# Patient Record
Sex: MALE | Race: WHITE | NOT HISPANIC OR LATINO | Employment: OTHER | URBAN - METROPOLITAN AREA
[De-identification: names, ages, dates, MRNs, and addresses within clinical notes are randomized per-mention and may not be internally consistent; named-entity substitution may affect disease eponyms.]

---

## 2017-01-09 DIAGNOSIS — E78.5 HYPERLIPIDEMIA, UNSPECIFIED HYPERLIPIDEMIA TYPE: ICD-10-CM

## 2017-01-09 RX ORDER — PRAVASTATIN SODIUM 80 MG/1
80 TABLET ORAL DAILY
Qty: 30 TABLET | Refills: 3 | Status: SHIPPED | OUTPATIENT
Start: 2017-01-09 | End: 2017-05-09 | Stop reason: SDUPTHER

## 2017-04-12 ENCOUNTER — OFFICE VISIT (OUTPATIENT)
Dept: FAMILY MEDICINE CLINIC | Facility: CLINIC | Age: 82
End: 2017-04-12

## 2017-04-12 VITALS
SYSTOLIC BLOOD PRESSURE: 102 MMHG | WEIGHT: 201 LBS | OXYGEN SATURATION: 97 % | HEIGHT: 70 IN | DIASTOLIC BLOOD PRESSURE: 78 MMHG | HEART RATE: 76 BPM | BODY MASS INDEX: 28.77 KG/M2 | TEMPERATURE: 97.6 F

## 2017-04-12 DIAGNOSIS — J40 BRONCHITIS: Primary | ICD-10-CM

## 2017-04-12 PROCEDURE — 99213 OFFICE O/P EST LOW 20 MIN: CPT | Performed by: FAMILY MEDICINE

## 2017-04-12 RX ORDER — DEXTROMETHORPHAN HYDROBROMIDE AND PROMETHAZINE HYDROCHLORIDE 15; 6.25 MG/5ML; MG/5ML
5 SYRUP ORAL 4 TIMES DAILY PRN
Qty: 240 ML | Refills: 1 | Status: SHIPPED | OUTPATIENT
Start: 2017-04-12 | End: 2017-11-10

## 2017-04-12 RX ORDER — LEVOFLOXACIN 500 MG/1
500 TABLET, FILM COATED ORAL DAILY
Qty: 10 TABLET | Refills: 0 | Status: SHIPPED | OUTPATIENT
Start: 2017-04-12 | End: 2017-11-10

## 2017-04-12 NOTE — PROGRESS NOTES
Subjective   Darron Martino is a 84 y.o. male    HPI Comments: Patient presents with one-week history of cough, intermittent fever, slight sore throat, burning sensation in the chest and mild shortness of breath.  He is tried over-the-counter medications without relief.  He is worried about pneumonia.    Cough   Associated symptoms include a fever, shortness of breath and wheezing. Pertinent negatives include no chest pain or chills.   Fever    Associated symptoms include coughing and wheezing. Pertinent negatives include no chest pain or nausea.   URI    Associated symptoms include coughing and wheezing. Pertinent negatives include no chest pain or nausea.   Shortness of Breath   Associated symptoms include a fever and wheezing. Pertinent negatives include no chest pain.       The following portions of the patient's history were reviewed and updated as appropriate: allergies, current medications, past social history and problem list    Review of Systems   Constitutional: Positive for fever. Negative for chills and fatigue.   HENT: Negative.    Respiratory: Positive for cough, shortness of breath and wheezing. Negative for chest tightness.    Cardiovascular: Negative for chest pain.   Gastrointestinal: Negative for nausea.       Objective     Vitals:    04/12/17 1020   BP: 102/78   Pulse: 76   Temp: 97.6 °F (36.4 °C)   SpO2: 97%       Physical Exam   Constitutional: He is oriented to person, place, and time. He appears well-developed and well-nourished.   HENT:   Head: Normocephalic and atraumatic.   Nose: Nose normal.   Mouth/Throat: Oropharynx is clear and moist.   Neck: Neck supple. No JVD present.   Cardiovascular: Normal rate, regular rhythm and normal heart sounds.    No murmur heard.  Pulmonary/Chest: Effort normal. No stridor. No respiratory distress. He has wheezes.   Musculoskeletal: He exhibits no edema.   Lymphadenopathy:     He has no cervical adenopathy.   Neurological: He is alert and oriented to  person, place, and time.   Skin: Skin is warm and dry. No rash noted.   Psychiatric: He has a normal mood and affect. His behavior is normal.   Nursing note and vitals reviewed.      Assessment/Plan   Problem List Items Addressed This Visit     None      Visit Diagnoses     Bronchitis    -  Primary    Relevant Medications    levoFLOXacin (LEVAQUIN) 500 MG tablet    promethazine-dextromethorphan (PROMETHAZINE-DM) 6.25-15 MG/5ML syrup

## 2017-05-01 DIAGNOSIS — I10 ESSENTIAL HYPERTENSION: ICD-10-CM

## 2017-05-01 RX ORDER — METOPROLOL SUCCINATE 50 MG/1
TABLET, EXTENDED RELEASE ORAL
Qty: 180 TABLET | Refills: 0 | Status: SHIPPED | OUTPATIENT
Start: 2017-05-01 | End: 2017-07-30 | Stop reason: SDUPTHER

## 2017-05-09 DIAGNOSIS — E78.5 HYPERLIPIDEMIA, UNSPECIFIED HYPERLIPIDEMIA TYPE: ICD-10-CM

## 2017-05-09 RX ORDER — PRAVASTATIN SODIUM 80 MG/1
80 TABLET ORAL DAILY
Qty: 30 TABLET | Refills: 3 | Status: SHIPPED | OUTPATIENT
Start: 2017-05-09 | End: 2017-09-18 | Stop reason: SDUPTHER

## 2017-05-19 DIAGNOSIS — I10 ESSENTIAL HYPERTENSION: ICD-10-CM

## 2017-05-19 RX ORDER — RAMIPRIL 5 MG/1
CAPSULE ORAL
Qty: 90 CAPSULE | Refills: 0 | Status: SHIPPED | OUTPATIENT
Start: 2017-05-19 | End: 2017-08-21 | Stop reason: SDUPTHER

## 2017-07-30 DIAGNOSIS — I10 ESSENTIAL HYPERTENSION: ICD-10-CM

## 2017-07-31 RX ORDER — METOPROLOL SUCCINATE 50 MG
TABLET, EXTENDED RELEASE 24 HR ORAL
Qty: 180 TABLET | Refills: 0 | Status: SHIPPED | OUTPATIENT
Start: 2017-07-31 | End: 2017-10-29 | Stop reason: SDUPTHER

## 2017-08-21 DIAGNOSIS — I10 ESSENTIAL HYPERTENSION: ICD-10-CM

## 2017-08-21 RX ORDER — RAMIPRIL 5 MG/1
CAPSULE ORAL
Qty: 90 CAPSULE | Refills: 1 | Status: SHIPPED | OUTPATIENT
Start: 2017-08-21 | End: 2017-11-10 | Stop reason: SDUPTHER

## 2017-09-14 ENCOUNTER — TELEPHONE (OUTPATIENT)
Dept: FAMILY MEDICINE CLINIC | Facility: CLINIC | Age: 82
End: 2017-09-14

## 2017-09-14 RX ORDER — CLOPIDOGREL BISULFATE 75 MG/1
75 TABLET ORAL DAILY
Qty: 90 TABLET | Refills: 1 | Status: SHIPPED | OUTPATIENT
Start: 2017-09-14 | End: 2017-09-18 | Stop reason: SDUPTHER

## 2017-09-14 NOTE — TELEPHONE ENCOUNTER
----- Message from Trista English sent at 9/14/2017  1:52 PM EDT -----  Contact: PATIENT  NEEDS A REFILL SENT IN FOR:    clopidogrel (PLAVIX) 75 MG tablet         Sig - Route: Take 75 mg by mouth daily. - Oral    [facility] - [address] - [phone]  - [phone] (Phone)  [phone] (Fax)    THANK YOU

## 2017-09-18 ENCOUNTER — TELEPHONE (OUTPATIENT)
Dept: FAMILY MEDICINE CLINIC | Facility: CLINIC | Age: 82
End: 2017-09-18

## 2017-09-18 DIAGNOSIS — E78.5 HYPERLIPIDEMIA, UNSPECIFIED HYPERLIPIDEMIA TYPE: ICD-10-CM

## 2017-09-18 RX ORDER — CLOPIDOGREL BISULFATE 75 MG/1
75 TABLET ORAL DAILY
Qty: 90 TABLET | Refills: 1 | Status: SHIPPED | OUTPATIENT
Start: 2017-09-18 | End: 2017-11-10 | Stop reason: SDUPTHER

## 2017-09-18 RX ORDER — PRAVASTATIN SODIUM 80 MG/1
80 TABLET ORAL DAILY
Qty: 30 TABLET | Refills: 3 | Status: SHIPPED | OUTPATIENT
Start: 2017-09-18 | End: 2017-11-10 | Stop reason: SDUPTHER

## 2017-09-18 NOTE — TELEPHONE ENCOUNTER
Rx for pravastatin sent to [facility] and [facility] as requested in pt's message. Rx's were sent electronically.

## 2017-09-18 NOTE — TELEPHONE ENCOUNTER
----- Message from Trista English sent at 9/18/2017  3:04 PM EDT -----  Contact: PATIENT  NEEDS A REFILL CALLED IN FOR:     pravastatin (PRAVACHOL) 80 MG tablet 30 tablet        Sig - Route: Take 1 tablet by mouth Daily. - Oral     Associated Diagnoses       Pharmacy     [facility] - [address] - [phone]  - [phone] FX          THE FOLLOWING MEDICATION NEEDS TO BE SENT TO [facility] (WAS SENT TO [facility] LAST WEEK) :  clopidogrel (PLAVIX) 75 MG tablet 90 tablet      Sig - Route: Take 1 tablet by mouth Daily. - Oral  [facility] - [address] - [phone]  - [phone] (Phone)  [phone] (Fax)  THIS WAS IN THE ORIGINAL NOTE LAST WEEK, BUT HE SAID IT WAS STILL SENT TO LOCAL RX.   THANK YOU

## 2017-10-29 DIAGNOSIS — I10 ESSENTIAL HYPERTENSION: ICD-10-CM

## 2017-11-01 RX ORDER — METOPROLOL SUCCINATE 50 MG
TABLET, EXTENDED RELEASE 24 HR ORAL
Qty: 180 TABLET | Refills: 0 | Status: SHIPPED | OUTPATIENT
Start: 2017-11-01 | End: 2017-11-10 | Stop reason: SDUPTHER

## 2017-11-10 ENCOUNTER — OFFICE VISIT (OUTPATIENT)
Dept: FAMILY MEDICINE CLINIC | Facility: CLINIC | Age: 82
End: 2017-11-10

## 2017-11-10 VITALS
WEIGHT: 202 LBS | TEMPERATURE: 97.4 F | HEIGHT: 70 IN | SYSTOLIC BLOOD PRESSURE: 126 MMHG | OXYGEN SATURATION: 98 % | DIASTOLIC BLOOD PRESSURE: 80 MMHG | BODY MASS INDEX: 28.92 KG/M2 | HEART RATE: 65 BPM

## 2017-11-10 DIAGNOSIS — I10 ESSENTIAL HYPERTENSION: ICD-10-CM

## 2017-11-10 DIAGNOSIS — M79.601 PARESTHESIA AND PAIN OF BOTH UPPER EXTREMITIES: ICD-10-CM

## 2017-11-10 DIAGNOSIS — E78.5 HYPERLIPIDEMIA, UNSPECIFIED HYPERLIPIDEMIA TYPE: ICD-10-CM

## 2017-11-10 DIAGNOSIS — M79.602 PARESTHESIA AND PAIN OF BOTH UPPER EXTREMITIES: ICD-10-CM

## 2017-11-10 DIAGNOSIS — R20.2 PARESTHESIA AND PAIN OF BOTH UPPER EXTREMITIES: ICD-10-CM

## 2017-11-10 DIAGNOSIS — I25.10 ARTERIOSCLEROSIS OF CORONARY ARTERY: Primary | ICD-10-CM

## 2017-11-10 DIAGNOSIS — M15.9 GENERALIZED OSTEOARTHROSIS, INVOLVING MULTIPLE SITES: ICD-10-CM

## 2017-11-10 PROCEDURE — 99214 OFFICE O/P EST MOD 30 MIN: CPT | Performed by: FAMILY MEDICINE

## 2017-11-10 RX ORDER — CYCLOBENZAPRINE HCL 5 MG
5 TABLET ORAL NIGHTLY
Qty: 30 TABLET | Refills: 5 | Status: SHIPPED | OUTPATIENT
Start: 2017-11-10 | End: 2019-01-01

## 2017-11-10 RX ORDER — CLOPIDOGREL BISULFATE 75 MG/1
75 TABLET ORAL DAILY
Qty: 90 TABLET | Refills: 3 | Status: SHIPPED | OUTPATIENT
Start: 2017-11-10 | End: 2018-05-12 | Stop reason: SDUPTHER

## 2017-11-10 RX ORDER — METOPROLOL SUCCINATE 50 MG/1
50 TABLET, EXTENDED RELEASE ORAL 2 TIMES DAILY
Qty: 180 TABLET | Refills: 3 | Status: SHIPPED | OUTPATIENT
Start: 2017-11-10 | End: 2018-08-20

## 2017-11-10 RX ORDER — RAMIPRIL 5 MG/1
5 CAPSULE ORAL DAILY
Qty: 90 CAPSULE | Refills: 3 | Status: SHIPPED | OUTPATIENT
Start: 2017-11-10 | End: 2019-01-01

## 2017-11-10 RX ORDER — PRAVASTATIN SODIUM 80 MG/1
80 TABLET ORAL DAILY
Qty: 90 TABLET | Refills: 3 | Status: SHIPPED | OUTPATIENT
Start: 2017-11-10 | End: 2018-11-26 | Stop reason: SDUPTHER

## 2017-11-10 RX ORDER — NITROGLYCERIN 0.4 MG/1
0.4 TABLET SUBLINGUAL
Qty: 30 TABLET | Refills: 11 | Status: SHIPPED | OUTPATIENT
Start: 2017-11-10 | End: 2019-01-01 | Stop reason: SDUPTHER

## 2017-11-10 NOTE — PROGRESS NOTES
Subjective   Darron Martino is a 85 y.o. male    HPI Comments: Patient presents for recheck regarding coronary artery disease, hypertension, hyperlipidemia and osteoarthritis.  He is due for refills on several of his medications.  Review of his chart shows that he is not had lab work in over a year.  He reports is having increased pain in his upper back and neck area.  X-rays done in 2015 of his thoracic spine revealed arthritis changes.  Patient reports that he has not had any anginal type symptoms, exertional dyspnea, edema, orthopnea or PND.    Osteoarthritis   Associated symptoms include arthralgias and neck pain. Pertinent negatives include no abdominal pain, chest pain, chills, coughing, fatigue, fever, headaches, myalgias, nausea, rash, vomiting or weakness.   Hypertension   Associated symptoms include neck pain. Pertinent negatives include no chest pain, headaches, palpitations or shortness of breath.   Coronary Artery Disease   Pertinent negatives include no chest pain, chest tightness, dizziness, leg swelling, palpitations or shortness of breath. Risk factors include hyperlipidemia.   Hyperlipidemia   Pertinent negatives include no chest pain, myalgias or shortness of breath.       The following portions of the patient's history were reviewed and updated as appropriate: allergies, current medications, past social history and problem list    Review of Systems   Constitutional: Negative for chills, fatigue, fever and unexpected weight change.   HENT: Negative.    Eyes: Negative.    Respiratory: Negative for cough, chest tightness, shortness of breath and wheezing.    Cardiovascular: Negative for chest pain, palpitations and leg swelling.   Gastrointestinal: Negative for abdominal pain, nausea and vomiting.   Endocrine: Negative for polydipsia, polyphagia and polyuria.   Genitourinary: Negative for dysuria, frequency and urgency.   Musculoskeletal: Positive for arthralgias, back pain, neck pain and neck  stiffness. Negative for myalgias.   Skin: Negative for color change and rash.   Neurological: Negative for dizziness, syncope, weakness and headaches.   Hematological: Negative for adenopathy. Does not bruise/bleed easily.   Psychiatric/Behavioral: Negative for dysphoric mood. The patient is not nervous/anxious.        Objective     Vitals:    11/10/17 1356   BP: 126/80   Pulse: 65   Temp: 97.4 °F (36.3 °C)   SpO2: 98%       Physical Exam   Constitutional: He is oriented to person, place, and time. He appears well-developed and well-nourished.   HENT:   Head: Normocephalic.   Mouth/Throat: Oropharynx is clear and moist.   Eyes: Conjunctivae are normal. Pupils are equal, round, and reactive to light.   Neck: Neck supple. No JVD present. Spinous process tenderness and muscular tenderness present. Decreased range of motion present. No thyromegaly present.   Cardiovascular: Normal rate, regular rhythm, normal heart sounds, intact distal pulses and normal pulses.    No murmur heard.  Pulmonary/Chest: Effort normal and breath sounds normal. No respiratory distress.   Abdominal: Soft. Bowel sounds are normal. There is no hepatosplenomegaly. There is no tenderness.   Musculoskeletal: He exhibits no edema.        Lumbar back: He exhibits decreased range of motion, tenderness, bony tenderness and pain. He exhibits no swelling, no deformity and no spasm.   Lymphadenopathy:     He has no cervical adenopathy.   Neurological: He is alert and oriented to person, place, and time. He has normal reflexes.   Skin: Skin is warm and dry. No rash noted.   Psychiatric: He has a normal mood and affect. His behavior is normal.   Nursing note and vitals reviewed.      Assessment/Plan   Problem List Items Addressed This Visit        Cardiovascular and Mediastinum    Hypertension    Relevant Medications    ramipril (ALTACE) 5 MG capsule    metoprolol succinate XL (TOPROL XL) 50 MG 24 hr tablet    Other Relevant Orders    Comprehensive  Metabolic Panel    Arteriosclerosis of coronary artery - Primary    Relevant Medications    clopidogrel (PLAVIX) 75 MG tablet    nitroglycerin (NITROSTAT) 0.4 MG SL tablet    metoprolol succinate XL (TOPROL XL) 50 MG 24 hr tablet    Other Relevant Orders    Comprehensive Metabolic Panel    Lipid Panel       Musculoskeletal and Integument    Generalized osteoarthrosis, involving multiple sites      Other Visit Diagnoses     Hyperlipidemia, unspecified hyperlipidemia type        Relevant Medications    pravastatin (PRAVACHOL) 80 MG tablet    Other Relevant Orders    Comprehensive Metabolic Panel    Lipid Panel    Paresthesia and pain of both upper extremities

## 2018-04-17 DIAGNOSIS — I10 ESSENTIAL HYPERTENSION: ICD-10-CM

## 2018-04-17 RX ORDER — RAMIPRIL 5 MG/1
CAPSULE ORAL
Qty: 90 CAPSULE | Refills: 1 | Status: SHIPPED | OUTPATIENT
Start: 2018-04-17 | End: 2018-08-20 | Stop reason: SDUPTHER

## 2018-05-18 RX ORDER — CLOPIDOGREL BISULFATE 75 MG/1
TABLET ORAL
Qty: 90 TABLET | Refills: 0 | Status: SHIPPED | OUTPATIENT
Start: 2018-05-18 | End: 2019-01-01 | Stop reason: SDUPTHER

## 2018-08-20 ENCOUNTER — OFFICE VISIT (OUTPATIENT)
Dept: FAMILY MEDICINE CLINIC | Facility: CLINIC | Age: 83
End: 2018-08-20

## 2018-08-20 ENCOUNTER — HOSPITAL ENCOUNTER (OUTPATIENT)
Dept: GENERAL RADIOLOGY | Facility: HOSPITAL | Age: 83
Discharge: HOME | End: 2018-08-20
Admitting: FAMILY MEDICINE

## 2018-08-20 VITALS
DIASTOLIC BLOOD PRESSURE: 70 MMHG | OXYGEN SATURATION: 98 % | HEIGHT: 70 IN | HEART RATE: 58 BPM | TEMPERATURE: 97.5 F | SYSTOLIC BLOOD PRESSURE: 150 MMHG | BODY MASS INDEX: 28.63 KG/M2 | WEIGHT: 200 LBS

## 2018-08-20 DIAGNOSIS — M25.511 ACUTE PAIN OF RIGHT SHOULDER: Primary | ICD-10-CM

## 2018-08-20 DIAGNOSIS — M15.9 GENERALIZED OSTEOARTHROSIS, INVOLVING MULTIPLE SITES: ICD-10-CM

## 2018-08-20 DIAGNOSIS — D49.2 NEOPLASM OF SKIN: ICD-10-CM

## 2018-08-20 DIAGNOSIS — M25.511 ACUTE PAIN OF RIGHT SHOULDER: ICD-10-CM

## 2018-08-20 PROCEDURE — 73030 X-RAY EXAM OF SHOULDER: CPT

## 2018-08-20 PROCEDURE — 99213 OFFICE O/P EST LOW 20 MIN: CPT | Performed by: FAMILY MEDICINE

## 2018-08-20 RX ORDER — IBUPROFEN 800 MG/1
800 TABLET ORAL EVERY 8 HOURS PRN
Qty: 90 TABLET | Refills: 11 | Status: SHIPPED | OUTPATIENT
Start: 2018-08-20 | End: 2019-01-01 | Stop reason: HOSPADM

## 2018-08-20 NOTE — PROGRESS NOTES
Subjective   Darron Martino is a 86 y.o. male    Patient presents today complaining of right shoulder pain with decreased mobility due to pain.  Symptoms have been present for 3 months and not improving.  He is able to get by taking ibuprofen 800 mg 3 times a day.  He stays quite active physically.  This discomfort has limited his activities.  He knows of no specific injury or triggering incident.    Pigmented skin lesion ×3 left forearm volar surface gradually increasing in size.  No pain.  Initial lesion noted approximately one year ago.  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        The following portions of the patient's history were reviewed and updated as appropriate: allergies, current medications, past social history and problem list    Review of Systems   Constitutional: Negative for chills, fatigue and fever.   HENT: Negative for trouble swallowing and voice change.    Respiratory: Negative for cough, shortness of breath and wheezing.    Cardiovascular: Negative for chest pain and palpitations.   Musculoskeletal: Positive for arthralgias and myalgias.   Skin: Positive for color change.   Neurological: Positive for weakness. Negative for dizziness and numbness.   Hematological: Negative.        Objective     Vitals:    08/20/18 0915   BP: 150/70   Pulse: 58   Temp: 97.5 °F (36.4 °C)   SpO2: 98%       Physical Exam   Constitutional: He appears well-developed and well-nourished.   HENT:   Head: Normocephalic and atraumatic.   Eyes: Pupils are equal, round, and reactive to light. Conjunctivae are normal.   Neck: Normal range of motion.   Cardiovascular: Normal rate and regular rhythm.    Pulmonary/Chest: Effort normal and breath sounds normal.   Musculoskeletal:        Right shoulder: He exhibits decreased range of motion, tenderness, bony tenderness, crepitus, pain and decreased strength. He exhibits no swelling, no effusion and no deformity.   Lymphadenopathy:      He has no cervical adenopathy.   Skin: Skin is warm and dry.   Left mid volar forearm with 2 irregularly pigmented lesions with areas of deep pigment along with lighter brown pigment.  These are adjacent to each other and are along a previous surgical scar.   Nursing note and vitals reviewed.      Assessment/Plan   Problem List Items Addressed This Visit        Musculoskeletal and Integument    Generalized osteoarthrosis, involving multiple sites      Other Visit Diagnoses     Acute pain of right shoulder    -  Primary    Relevant Orders    XR Shoulder 2+ View Right    Neoplasm of skin        Relevant Orders    Ambulatory Referral to Dermatology        Consider referral to orthopedics for injection therapy.  Likely will also need physical therapy.

## 2018-09-05 ENCOUNTER — TELEPHONE (OUTPATIENT)
Dept: FAMILY MEDICINE CLINIC | Facility: CLINIC | Age: 83
End: 2018-09-05

## 2018-09-05 NOTE — TELEPHONE ENCOUNTER
Called patient and explained about his shoulder having arthritis.  He is not wanting PT or injections at this time.

## 2018-09-05 NOTE — TELEPHONE ENCOUNTER
----- Message from Roseline Spivey sent at 9/5/2018  1:36 PM EDT -----  Contact: PT  WOULD LIKE RESULTS OF SHOULDER XRAYS FROM A COUPLE WEEK AGO. PLEASE CALL PT.

## 2018-10-09 ENCOUNTER — CLINICAL SUPPORT (OUTPATIENT)
Dept: FAMILY MEDICINE CLINIC | Facility: CLINIC | Age: 83
End: 2018-10-09

## 2018-10-09 DIAGNOSIS — Z23 IMMUNIZATION DUE: Primary | ICD-10-CM

## 2018-11-26 ENCOUNTER — TELEPHONE (OUTPATIENT)
Dept: FAMILY MEDICINE CLINIC | Facility: CLINIC | Age: 83
End: 2018-11-26

## 2018-11-26 DIAGNOSIS — E78.5 HYPERLIPIDEMIA, UNSPECIFIED HYPERLIPIDEMIA TYPE: ICD-10-CM

## 2018-11-26 RX ORDER — PRAVASTATIN SODIUM 80 MG/1
80 TABLET ORAL DAILY
Qty: 90 TABLET | Refills: 0 | Status: SHIPPED | OUTPATIENT
Start: 2018-11-26 | End: 2019-01-01 | Stop reason: SDUPTHER

## 2018-11-26 NOTE — TELEPHONE ENCOUNTER
----- Message from Trista English sent at 11/26/2018  9:39 AM EST -----  Contact: PATIENT  NEEDS A REFILL CALLED IN FOR THE FOLLOWING: HE IS OUT:    pravastatin (PRAVACHOL) 80 MG tablet 90 tablet 3    Sig - Route: Take 1 tablet by mouth Daily. - Oral   Sent to pharmacy as: pravastatin 80 MG PO tablet   E-Prescribing Status: Receipt confirmed by pharmacy (   Associated Diagnoses     Hyperlipidemia, unspecified hyperlipidemia type     Pharmacy     [facility] [place] [id number] [address]  - [phone]  - [phone] FX

## 2019-01-01 ENCOUNTER — HOSPITAL ENCOUNTER (OUTPATIENT)
Dept: ONCOLOGY | Facility: HOSPITAL | Age: 84
Setting detail: INFUSION SERIES
Discharge: HOME | End: 2019-11-22

## 2019-01-01 ENCOUNTER — HOSPITAL ENCOUNTER (OUTPATIENT)
Dept: CT IMAGING | Facility: HOSPITAL | Age: 84
Discharge: HOME | End: 2019-12-06
Admitting: INTERNAL MEDICINE

## 2019-01-01 ENCOUNTER — HOSPITAL ENCOUNTER (OUTPATIENT)
Dept: ONCOLOGY | Facility: HOSPITAL | Age: 84
Setting detail: INFUSION SERIES
Discharge: HOME | End: 2019-10-25

## 2019-01-01 ENCOUNTER — OFFICE VISIT (OUTPATIENT)
Dept: ONCOLOGY | Facility: CLINIC | Age: 84
End: 2019-01-01

## 2019-01-01 ENCOUNTER — HOSPITAL ENCOUNTER (OUTPATIENT)
Dept: GENERAL RADIOLOGY | Facility: HOSPITAL | Age: 84
Discharge: HOME | End: 2019-07-23
Admitting: SURGERY

## 2019-01-01 ENCOUNTER — APPOINTMENT (OUTPATIENT)
Dept: LAB | Facility: HOSPITAL | Age: 84
End: 2019-01-01

## 2019-01-01 ENCOUNTER — HOSPITAL ENCOUNTER (INPATIENT)
Facility: HOSPITAL | Age: 84
LOS: 2 days | End: 2019-12-13
Attending: INTERNAL MEDICINE | Admitting: INTERNAL MEDICINE

## 2019-01-01 ENCOUNTER — APPOINTMENT (OUTPATIENT)
Dept: GENERAL RADIOLOGY | Facility: HOSPITAL | Age: 84
End: 2019-01-01

## 2019-01-01 ENCOUNTER — OFFICE VISIT (OUTPATIENT)
Dept: RADIATION ONCOLOGY | Facility: HOSPITAL | Age: 84
End: 2019-01-01

## 2019-01-01 ENCOUNTER — HOSPITAL ENCOUNTER (OUTPATIENT)
Dept: ONCOLOGY | Facility: HOSPITAL | Age: 84
Setting detail: INFUSION SERIES
Discharge: HOME | End: 2019-10-04

## 2019-01-01 ENCOUNTER — DOCUMENTATION (OUTPATIENT)
Dept: NUTRITION | Facility: HOSPITAL | Age: 84
End: 2019-01-01

## 2019-01-01 ENCOUNTER — HOSPITAL ENCOUNTER (OUTPATIENT)
Dept: RADIATION ONCOLOGY | Facility: HOSPITAL | Age: 84
Discharge: HOME | End: 2019-04-26

## 2019-01-01 ENCOUNTER — OFFICE VISIT (OUTPATIENT)
Dept: FAMILY MEDICINE CLINIC | Facility: CLINIC | Age: 84
End: 2019-01-01

## 2019-01-01 ENCOUNTER — HOSPITAL ENCOUNTER (OUTPATIENT)
Dept: ONCOLOGY | Facility: HOSPITAL | Age: 84
Setting detail: INFUSION SERIES
Discharge: HOME | End: 2019-07-10

## 2019-01-01 ENCOUNTER — HOSPITAL ENCOUNTER (OUTPATIENT)
Dept: RADIATION ONCOLOGY | Facility: HOSPITAL | Age: 84
Setting detail: RADIATION/ONCOLOGY SERIES
Discharge: HOME | End: 2019-05-01

## 2019-01-01 ENCOUNTER — APPOINTMENT (OUTPATIENT)
Dept: CT IMAGING | Facility: HOSPITAL | Age: 84
End: 2019-01-01

## 2019-01-01 ENCOUNTER — HOSPITAL ENCOUNTER (OUTPATIENT)
Dept: GENERAL RADIOLOGY | Facility: HOSPITAL | Age: 84
Discharge: HOME | End: 2019-02-15
Admitting: FAMILY MEDICINE

## 2019-01-01 ENCOUNTER — HOSPITAL ENCOUNTER (OUTPATIENT)
Dept: ONCOLOGY | Facility: HOSPITAL | Age: 84
Setting detail: INFUSION SERIES
Discharge: HOME | End: 2019-07-03

## 2019-01-01 ENCOUNTER — HOSPITAL ENCOUNTER (OUTPATIENT)
Dept: ONCOLOGY | Facility: HOSPITAL | Age: 84
Setting detail: INFUSION SERIES
Discharge: HOME | End: 2019-05-23

## 2019-01-01 ENCOUNTER — HOSPITAL ENCOUNTER (OUTPATIENT)
Dept: RADIATION ONCOLOGY | Facility: HOSPITAL | Age: 84
Discharge: HOME | End: 2019-04-29

## 2019-01-01 ENCOUNTER — HOSPITAL ENCOUNTER (OUTPATIENT)
Dept: ONCOLOGY | Facility: HOSPITAL | Age: 84
Discharge: HOME | End: 2019-11-15

## 2019-01-01 ENCOUNTER — LAB (OUTPATIENT)
Dept: LAB | Facility: HOSPITAL | Age: 84
End: 2019-01-01

## 2019-01-01 ENCOUNTER — HOSPITAL ENCOUNTER (OUTPATIENT)
Dept: MRI IMAGING | Facility: HOSPITAL | Age: 84
Discharge: HOME | End: 2019-05-28
Admitting: RADIOLOGY

## 2019-01-01 ENCOUNTER — READMISSION MANAGEMENT (OUTPATIENT)
Dept: CALL CENTER | Facility: HOSPITAL | Age: 84
End: 2019-01-01

## 2019-01-01 ENCOUNTER — TRANSITIONAL CARE MANAGEMENT TELEPHONE ENCOUNTER (OUTPATIENT)
Dept: FAMILY MEDICINE CLINIC | Facility: CLINIC | Age: 84
End: 2019-01-01

## 2019-01-01 ENCOUNTER — HOSPITAL ENCOUNTER (OUTPATIENT)
Dept: RADIATION ONCOLOGY | Facility: HOSPITAL | Age: 84
Discharge: HOME | End: 2019-04-25

## 2019-01-01 ENCOUNTER — HOSPITAL ENCOUNTER (OUTPATIENT)
Dept: CT IMAGING | Facility: HOSPITAL | Age: 84
Discharge: HOME | End: 2019-09-11
Admitting: INTERNAL MEDICINE

## 2019-01-01 ENCOUNTER — HOSPITAL ENCOUNTER (OUTPATIENT)
Dept: RADIATION ONCOLOGY | Facility: HOSPITAL | Age: 84
Discharge: HOME | End: 2019-04-30

## 2019-01-01 ENCOUNTER — HOSPITAL ENCOUNTER (OUTPATIENT)
Dept: RADIATION ONCOLOGY | Facility: HOSPITAL | Age: 84
Discharge: HOME | End: 2019-04-24

## 2019-01-01 ENCOUNTER — HOSPITAL ENCOUNTER (OUTPATIENT)
Dept: CT IMAGING | Facility: HOSPITAL | Age: 84
Discharge: HOME | End: 2019-05-28

## 2019-01-01 ENCOUNTER — HOSPITAL ENCOUNTER (OUTPATIENT)
Dept: ONCOLOGY | Facility: HOSPITAL | Age: 84
Setting detail: INFUSION SERIES
Discharge: HOME | End: 2019-06-13

## 2019-01-01 ENCOUNTER — HOSPITAL ENCOUNTER (OUTPATIENT)
Dept: RADIATION ONCOLOGY | Facility: HOSPITAL | Age: 84
Discharge: HOME | End: 2019-05-01

## 2019-01-01 ENCOUNTER — APPOINTMENT (OUTPATIENT)
Dept: ONCOLOGY | Facility: HOSPITAL | Age: 84
End: 2019-01-01

## 2019-01-01 ENCOUNTER — HOSPITAL ENCOUNTER (OUTPATIENT)
Dept: ONCOLOGY | Facility: HOSPITAL | Age: 84
Setting detail: INFUSION SERIES
Discharge: HOME | End: 2019-05-10

## 2019-01-01 ENCOUNTER — HOSPITAL ENCOUNTER (OUTPATIENT)
Dept: ONCOLOGY | Facility: HOSPITAL | Age: 84
Setting detail: INFUSION SERIES
Discharge: HOME | End: 2019-11-15

## 2019-01-01 ENCOUNTER — HOSPITAL ENCOUNTER (OUTPATIENT)
Dept: CARDIOLOGY | Facility: HOSPITAL | Age: 84
Discharge: HOME | End: 2019-07-10
Admitting: INTERNAL MEDICINE

## 2019-01-01 ENCOUNTER — HOSPITAL ENCOUNTER (OUTPATIENT)
Dept: MRI IMAGING | Facility: HOSPITAL | Age: 84
Discharge: HOME | End: 2019-09-11

## 2019-01-01 ENCOUNTER — HOSPITAL ENCOUNTER (OUTPATIENT)
Dept: RADIATION ONCOLOGY | Facility: HOSPITAL | Age: 84
Discharge: HOME | End: 2019-04-23

## 2019-01-01 ENCOUNTER — HOSPITAL ENCOUNTER (OUTPATIENT)
Dept: RADIATION ONCOLOGY | Facility: HOSPITAL | Age: 84
Setting detail: RADIATION/ONCOLOGY SERIES
Discharge: HOME | End: 2019-09-13

## 2019-01-01 ENCOUNTER — CLINICAL SUPPORT (OUTPATIENT)
Dept: FAMILY MEDICINE CLINIC | Facility: CLINIC | Age: 84
End: 2019-01-01

## 2019-01-01 ENCOUNTER — HOSPITAL ENCOUNTER (OUTPATIENT)
Dept: RADIATION ONCOLOGY | Facility: HOSPITAL | Age: 84
Discharge: HOME | End: 2019-05-06

## 2019-01-01 ENCOUNTER — HOSPITAL ENCOUNTER (OUTPATIENT)
Dept: RADIATION ONCOLOGY | Facility: HOSPITAL | Age: 84
Setting detail: RADIATION/ONCOLOGY SERIES
Discharge: HOME | End: 2019-06-03

## 2019-01-01 ENCOUNTER — HOSPITAL ENCOUNTER (OUTPATIENT)
Dept: ONCOLOGY | Facility: HOSPITAL | Age: 84
Setting detail: INFUSION SERIES
Discharge: HOME | End: 2019-06-19

## 2019-01-01 ENCOUNTER — TRANSCRIBE ORDERS (OUTPATIENT)
Dept: GENERAL RADIOLOGY | Facility: HOSPITAL | Age: 84
End: 2019-01-01

## 2019-01-01 ENCOUNTER — HOSPITAL ENCOUNTER (OUTPATIENT)
Dept: ONCOLOGY | Facility: HOSPITAL | Age: 84
Setting detail: INFUSION SERIES
Discharge: HOME | End: 2019-05-09

## 2019-01-01 ENCOUNTER — HOSPITAL ENCOUNTER (EMERGENCY)
Facility: HOSPITAL | Age: 84
Discharge: HOME | End: 2019-06-21
Attending: EMERGENCY MEDICINE | Admitting: EMERGENCY MEDICINE

## 2019-01-01 ENCOUNTER — HOSPITAL ENCOUNTER (EMERGENCY)
Facility: HOSPITAL | Age: 84
Discharge: HOME | End: 2019-11-04
Attending: EMERGENCY MEDICINE | Admitting: EMERGENCY MEDICINE

## 2019-01-01 ENCOUNTER — HOSPITAL ENCOUNTER (OUTPATIENT)
Dept: RADIATION ONCOLOGY | Facility: HOSPITAL | Age: 84
Setting detail: RADIATION/ONCOLOGY SERIES
Discharge: HOME | End: 2019-04-19

## 2019-01-01 ENCOUNTER — EDUCATION (OUTPATIENT)
Dept: ONCOLOGY | Facility: HOSPITAL | Age: 84
End: 2019-01-01

## 2019-01-01 ENCOUNTER — HOSPITAL ENCOUNTER (OUTPATIENT)
Dept: ONCOLOGY | Facility: HOSPITAL | Age: 84
Setting detail: INFUSION SERIES
Discharge: HOME | End: 2019-06-26

## 2019-01-01 ENCOUNTER — HOSPITAL ENCOUNTER (OUTPATIENT)
Dept: ONCOLOGY | Facility: HOSPITAL | Age: 84
Setting detail: INFUSION SERIES
Discharge: HOME | End: 2019-06-06

## 2019-01-01 ENCOUNTER — APPOINTMENT (OUTPATIENT)
Dept: MRI IMAGING | Facility: HOSPITAL | Age: 84
End: 2019-01-01

## 2019-01-01 ENCOUNTER — HOSPITAL ENCOUNTER (OUTPATIENT)
Dept: ONCOLOGY | Facility: HOSPITAL | Age: 84
Setting detail: INFUSION SERIES
Discharge: HOME | End: 2019-05-30

## 2019-01-01 ENCOUNTER — HOSPITAL ENCOUNTER (OUTPATIENT)
Dept: INFUSION THERAPY | Facility: HOSPITAL | Age: 84
Discharge: HOME | End: 2019-05-08
Admitting: INTERNAL MEDICINE

## 2019-01-01 ENCOUNTER — HOSPITAL ENCOUNTER (OUTPATIENT)
Dept: ONCOLOGY | Facility: HOSPITAL | Age: 84
Setting detail: INFUSION SERIES
Discharge: HOME | End: 2019-05-08

## 2019-01-01 ENCOUNTER — HOSPITAL ENCOUNTER (OUTPATIENT)
Dept: ONCOLOGY | Facility: HOSPITAL | Age: 84
Setting detail: INFUSION SERIES
Discharge: HOME | End: 2019-05-16

## 2019-01-01 ENCOUNTER — HOSPITAL ENCOUNTER (OUTPATIENT)
Dept: ONCOLOGY | Facility: HOSPITAL | Age: 84
Setting detail: INFUSION SERIES
Discharge: HOME | End: 2019-07-31

## 2019-01-01 ENCOUNTER — HOSPITAL ENCOUNTER (OUTPATIENT)
Dept: ONCOLOGY | Facility: HOSPITAL | Age: 84
Setting detail: INFUSION SERIES
Discharge: HOME | End: 2019-05-29

## 2019-01-01 ENCOUNTER — HOSPITAL ENCOUNTER (INPATIENT)
Facility: HOSPITAL | Age: 84
LOS: 7 days | Discharge: HOME | End: 2019-04-18
Attending: EMERGENCY MEDICINE | Admitting: INTERNAL MEDICINE

## 2019-01-01 ENCOUNTER — HOSPITAL ENCOUNTER (OUTPATIENT)
Dept: ONCOLOGY | Facility: HOSPITAL | Age: 84
Setting detail: INFUSION SERIES
Discharge: HOME | End: 2019-05-31

## 2019-01-01 ENCOUNTER — HOSPITAL ENCOUNTER (OUTPATIENT)
Dept: ONCOLOGY | Facility: HOSPITAL | Age: 84
Setting detail: INFUSION SERIES
Discharge: HOME | End: 2019-09-13

## 2019-01-01 ENCOUNTER — HOSPITAL ENCOUNTER (OUTPATIENT)
Dept: ONCOLOGY | Facility: HOSPITAL | Age: 84
Setting detail: INFUSION SERIES
Discharge: HOME | End: 2019-08-22

## 2019-01-01 ENCOUNTER — HOSPITAL ENCOUNTER (OUTPATIENT)
Dept: RADIATION ONCOLOGY | Facility: HOSPITAL | Age: 84
Discharge: HOME | End: 2019-05-03

## 2019-01-01 ENCOUNTER — HOSPITAL ENCOUNTER (OUTPATIENT)
Dept: RADIATION ONCOLOGY | Facility: HOSPITAL | Age: 84
Discharge: HOME | End: 2019-05-02

## 2019-01-01 ENCOUNTER — APPOINTMENT (OUTPATIENT)
Dept: CARDIOLOGY | Facility: HOSPITAL | Age: 84
End: 2019-01-01

## 2019-01-01 ENCOUNTER — TELEPHONE (OUTPATIENT)
Dept: FAMILY MEDICINE CLINIC | Facility: CLINIC | Age: 84
End: 2019-01-01

## 2019-01-01 ENCOUNTER — TELEPHONE (OUTPATIENT)
Dept: RADIATION ONCOLOGY | Facility: HOSPITAL | Age: 84
End: 2019-01-01

## 2019-01-01 ENCOUNTER — TRANSCRIBE ORDERS (OUTPATIENT)
Dept: LAB | Facility: HOSPITAL | Age: 84
End: 2019-01-01

## 2019-01-01 ENCOUNTER — HOSPITAL ENCOUNTER (INPATIENT)
Facility: HOSPITAL | Age: 84
LOS: 3 days | Discharge: HOSPICE | End: 2019-12-11
Attending: EMERGENCY MEDICINE | Admitting: HOSPITALIST

## 2019-01-01 VITALS
DIASTOLIC BLOOD PRESSURE: 56 MMHG | HEIGHT: 68 IN | TEMPERATURE: 97.5 F | RESPIRATION RATE: 18 BRPM | SYSTOLIC BLOOD PRESSURE: 107 MMHG | OXYGEN SATURATION: 95 % | HEART RATE: 79 BPM | BODY MASS INDEX: 26.22 KG/M2 | WEIGHT: 173 LBS

## 2019-01-01 VITALS
RESPIRATION RATE: 16 BRPM | HEIGHT: 69 IN | DIASTOLIC BLOOD PRESSURE: 51 MMHG | WEIGHT: 173 LBS | BODY MASS INDEX: 25.62 KG/M2 | SYSTOLIC BLOOD PRESSURE: 99 MMHG | TEMPERATURE: 99.2 F | HEART RATE: 90 BPM

## 2019-01-01 VITALS
HEART RATE: 82 BPM | DIASTOLIC BLOOD PRESSURE: 66 MMHG | BODY MASS INDEX: 25.34 KG/M2 | WEIGHT: 177 LBS | TEMPERATURE: 97.1 F | SYSTOLIC BLOOD PRESSURE: 116 MMHG | RESPIRATION RATE: 20 BRPM | HEIGHT: 70 IN

## 2019-01-01 VITALS
DIASTOLIC BLOOD PRESSURE: 84 MMHG | OXYGEN SATURATION: 94 % | BODY MASS INDEX: 24.62 KG/M2 | TEMPERATURE: 97.6 F | WEIGHT: 172 LBS | HEIGHT: 70 IN | RESPIRATION RATE: 18 BRPM | HEART RATE: 69 BPM | SYSTOLIC BLOOD PRESSURE: 117 MMHG

## 2019-01-01 VITALS
RESPIRATION RATE: 22 BRPM | HEIGHT: 70 IN | HEART RATE: 70 BPM | DIASTOLIC BLOOD PRESSURE: 58 MMHG | TEMPERATURE: 98.1 F | WEIGHT: 167 LBS | SYSTOLIC BLOOD PRESSURE: 118 MMHG | BODY MASS INDEX: 23.91 KG/M2

## 2019-01-01 VITALS
DIASTOLIC BLOOD PRESSURE: 72 MMHG | BODY MASS INDEX: 25.62 KG/M2 | OXYGEN SATURATION: 93 % | TEMPERATURE: 97.1 F | HEART RATE: 64 BPM | WEIGHT: 173 LBS | HEIGHT: 70 IN | SYSTOLIC BLOOD PRESSURE: 140 MMHG | DIASTOLIC BLOOD PRESSURE: 65 MMHG | RESPIRATION RATE: 20 BRPM | WEIGHT: 175 LBS | BODY MASS INDEX: 25.05 KG/M2 | HEART RATE: 54 BPM | HEIGHT: 69 IN | RESPIRATION RATE: 20 BRPM | SYSTOLIC BLOOD PRESSURE: 143 MMHG | TEMPERATURE: 97.4 F | OXYGEN SATURATION: 95 %

## 2019-01-01 VITALS
WEIGHT: 174.2 LBS | TEMPERATURE: 97.4 F | SYSTOLIC BLOOD PRESSURE: 93 MMHG | RESPIRATION RATE: 18 BRPM | DIASTOLIC BLOOD PRESSURE: 65 MMHG | OXYGEN SATURATION: 98 % | HEART RATE: 85 BPM | BODY MASS INDEX: 26.88 KG/M2

## 2019-01-01 VITALS
TEMPERATURE: 97.4 F | HEART RATE: 66 BPM | RESPIRATION RATE: 18 BRPM | SYSTOLIC BLOOD PRESSURE: 124 MMHG | OXYGEN SATURATION: 97 % | DIASTOLIC BLOOD PRESSURE: 61 MMHG | BODY MASS INDEX: 25.2 KG/M2 | WEIGHT: 176 LBS | HEIGHT: 70 IN

## 2019-01-01 VITALS
BODY MASS INDEX: 25.48 KG/M2 | SYSTOLIC BLOOD PRESSURE: 101 MMHG | HEIGHT: 69 IN | TEMPERATURE: 97.5 F | WEIGHT: 172 LBS | TEMPERATURE: 97.4 F | RESPIRATION RATE: 18 BRPM | HEIGHT: 68 IN | DIASTOLIC BLOOD PRESSURE: 50 MMHG | BODY MASS INDEX: 26.22 KG/M2 | WEIGHT: 173 LBS | HEART RATE: 76 BPM | SYSTOLIC BLOOD PRESSURE: 131 MMHG | RESPIRATION RATE: 18 BRPM | HEART RATE: 67 BPM | DIASTOLIC BLOOD PRESSURE: 74 MMHG

## 2019-01-01 VITALS
DIASTOLIC BLOOD PRESSURE: 61 MMHG | TEMPERATURE: 97.6 F | WEIGHT: 174.4 LBS | SYSTOLIC BLOOD PRESSURE: 127 MMHG | HEIGHT: 69 IN | RESPIRATION RATE: 16 BRPM | HEART RATE: 69 BPM | BODY MASS INDEX: 25.83 KG/M2 | OXYGEN SATURATION: 95 %

## 2019-01-01 VITALS
TEMPERATURE: 99 F | HEART RATE: 84 BPM | OXYGEN SATURATION: 96 % | WEIGHT: 174 LBS | HEIGHT: 68 IN | DIASTOLIC BLOOD PRESSURE: 62 MMHG | BODY MASS INDEX: 26.37 KG/M2 | SYSTOLIC BLOOD PRESSURE: 128 MMHG

## 2019-01-01 VITALS
RESPIRATION RATE: 18 BRPM | SYSTOLIC BLOOD PRESSURE: 122 MMHG | TEMPERATURE: 97.9 F | DIASTOLIC BLOOD PRESSURE: 55 MMHG | HEART RATE: 58 BPM

## 2019-01-01 VITALS
SYSTOLIC BLOOD PRESSURE: 152 MMHG | BODY MASS INDEX: 27.49 KG/M2 | OXYGEN SATURATION: 97 % | WEIGHT: 192 LBS | HEART RATE: 86 BPM | TEMPERATURE: 98.2 F | HEIGHT: 70 IN | DIASTOLIC BLOOD PRESSURE: 80 MMHG

## 2019-01-01 VITALS
HEART RATE: 79 BPM | DIASTOLIC BLOOD PRESSURE: 76 MMHG | SYSTOLIC BLOOD PRESSURE: 145 MMHG | WEIGHT: 178 LBS | BODY MASS INDEX: 26.98 KG/M2 | RESPIRATION RATE: 18 BRPM | OXYGEN SATURATION: 93 % | TEMPERATURE: 97.3 F | HEIGHT: 68 IN

## 2019-01-01 VITALS
BODY MASS INDEX: 25.62 KG/M2 | RESPIRATION RATE: 18 BRPM | HEART RATE: 72 BPM | OXYGEN SATURATION: 91 % | HEIGHT: 69 IN | DIASTOLIC BLOOD PRESSURE: 71 MMHG | TEMPERATURE: 97.3 F | WEIGHT: 173 LBS | SYSTOLIC BLOOD PRESSURE: 171 MMHG

## 2019-01-01 VITALS
HEIGHT: 70 IN | RESPIRATION RATE: 20 BRPM | BODY MASS INDEX: 24.39 KG/M2 | HEART RATE: 105 BPM | OXYGEN SATURATION: 84 % | SYSTOLIC BLOOD PRESSURE: 153 MMHG | TEMPERATURE: 98.1 F | WEIGHT: 170.4 LBS | DIASTOLIC BLOOD PRESSURE: 94 MMHG

## 2019-01-01 VITALS
BODY MASS INDEX: 25.62 KG/M2 | HEIGHT: 70 IN | HEART RATE: 65 BPM | DIASTOLIC BLOOD PRESSURE: 62 MMHG | SYSTOLIC BLOOD PRESSURE: 136 MMHG | WEIGHT: 179 LBS | OXYGEN SATURATION: 98 % | TEMPERATURE: 97.6 F

## 2019-01-01 VITALS
BODY MASS INDEX: 25.34 KG/M2 | WEIGHT: 177 LBS | SYSTOLIC BLOOD PRESSURE: 125 MMHG | HEART RATE: 70 BPM | TEMPERATURE: 98.5 F | RESPIRATION RATE: 16 BRPM | OXYGEN SATURATION: 99 % | HEIGHT: 70 IN | DIASTOLIC BLOOD PRESSURE: 70 MMHG

## 2019-01-01 VITALS
TEMPERATURE: 97.6 F | SYSTOLIC BLOOD PRESSURE: 98 MMHG | DIASTOLIC BLOOD PRESSURE: 64 MMHG | HEART RATE: 80 BPM | OXYGEN SATURATION: 95 % | RESPIRATION RATE: 16 BRPM | BODY MASS INDEX: 25.77 KG/M2 | HEIGHT: 69 IN | WEIGHT: 174 LBS

## 2019-01-01 VITALS
RESPIRATION RATE: 18 BRPM | BODY MASS INDEX: 26.07 KG/M2 | WEIGHT: 172 LBS | HEIGHT: 68 IN | SYSTOLIC BLOOD PRESSURE: 92 MMHG | DIASTOLIC BLOOD PRESSURE: 58 MMHG | HEART RATE: 104 BPM | TEMPERATURE: 96.9 F

## 2019-01-01 VITALS
DIASTOLIC BLOOD PRESSURE: 54 MMHG | BODY MASS INDEX: 26.37 KG/M2 | RESPIRATION RATE: 16 BRPM | HEART RATE: 88 BPM | WEIGHT: 174 LBS | HEIGHT: 68 IN | SYSTOLIC BLOOD PRESSURE: 110 MMHG | TEMPERATURE: 97.7 F

## 2019-01-01 VITALS
HEIGHT: 70 IN | OXYGEN SATURATION: 94 % | SYSTOLIC BLOOD PRESSURE: 134 MMHG | BODY MASS INDEX: 24.05 KG/M2 | HEART RATE: 67 BPM | DIASTOLIC BLOOD PRESSURE: 76 MMHG | TEMPERATURE: 97.4 F | WEIGHT: 168 LBS | RESPIRATION RATE: 16 BRPM

## 2019-01-01 VITALS
RESPIRATION RATE: 16 BRPM | WEIGHT: 173 LBS | HEART RATE: 94 BPM | BODY MASS INDEX: 26.22 KG/M2 | SYSTOLIC BLOOD PRESSURE: 96 MMHG | TEMPERATURE: 97.6 F | HEIGHT: 68 IN | DIASTOLIC BLOOD PRESSURE: 51 MMHG

## 2019-01-01 VITALS
TEMPERATURE: 97.1 F | SYSTOLIC BLOOD PRESSURE: 110 MMHG | WEIGHT: 173 LBS | DIASTOLIC BLOOD PRESSURE: 63 MMHG | RESPIRATION RATE: 16 BRPM | HEIGHT: 68 IN | BODY MASS INDEX: 26.22 KG/M2 | HEART RATE: 80 BPM

## 2019-01-01 VITALS
BODY MASS INDEX: 25.48 KG/M2 | RESPIRATION RATE: 20 BRPM | HEART RATE: 72 BPM | SYSTOLIC BLOOD PRESSURE: 118 MMHG | HEIGHT: 70 IN | TEMPERATURE: 97 F | WEIGHT: 178 LBS | DIASTOLIC BLOOD PRESSURE: 74 MMHG

## 2019-01-01 VITALS
HEART RATE: 89 BPM | TEMPERATURE: 97.8 F | SYSTOLIC BLOOD PRESSURE: 117 MMHG | DIASTOLIC BLOOD PRESSURE: 82 MMHG | RESPIRATION RATE: 18 BRPM | OXYGEN SATURATION: 98 %

## 2019-01-01 VITALS
HEART RATE: 66 BPM | OXYGEN SATURATION: 96 % | TEMPERATURE: 97.4 F | DIASTOLIC BLOOD PRESSURE: 63 MMHG | SYSTOLIC BLOOD PRESSURE: 138 MMHG | WEIGHT: 171 LBS | RESPIRATION RATE: 24 BRPM | BODY MASS INDEX: 24.48 KG/M2 | HEIGHT: 70 IN

## 2019-01-01 VITALS — OXYGEN SATURATION: 74 % | RESPIRATION RATE: 22 BRPM

## 2019-01-01 VITALS
BODY MASS INDEX: 26.52 KG/M2 | WEIGHT: 175 LBS | HEIGHT: 68 IN | HEART RATE: 92 BPM | DIASTOLIC BLOOD PRESSURE: 58 MMHG | TEMPERATURE: 98 F | SYSTOLIC BLOOD PRESSURE: 115 MMHG | RESPIRATION RATE: 16 BRPM

## 2019-01-01 VITALS
HEART RATE: 67 BPM | SYSTOLIC BLOOD PRESSURE: 121 MMHG | WEIGHT: 168 LBS | DIASTOLIC BLOOD PRESSURE: 58 MMHG | RESPIRATION RATE: 24 BRPM | OXYGEN SATURATION: 94 % | TEMPERATURE: 97.6 F | HEIGHT: 70 IN | BODY MASS INDEX: 24.05 KG/M2

## 2019-01-01 VITALS
SYSTOLIC BLOOD PRESSURE: 131 MMHG | OXYGEN SATURATION: 80 % | BODY MASS INDEX: 28.36 KG/M2 | TEMPERATURE: 97.6 F | DIASTOLIC BLOOD PRESSURE: 62 MMHG | WEIGHT: 191.5 LBS | RESPIRATION RATE: 16 BRPM | HEIGHT: 69 IN | HEART RATE: 77 BPM

## 2019-01-01 VITALS — HEART RATE: 68 BPM | SYSTOLIC BLOOD PRESSURE: 126 MMHG | DIASTOLIC BLOOD PRESSURE: 50 MMHG

## 2019-01-01 VITALS — BODY MASS INDEX: 25.19 KG/M2 | WEIGHT: 175.93 LBS | HEIGHT: 70 IN

## 2019-01-01 VITALS — BODY MASS INDEX: 25.58 KG/M2 | WEIGHT: 173.2 LBS

## 2019-01-01 VITALS
HEART RATE: 89 BPM | RESPIRATION RATE: 18 BRPM | TEMPERATURE: 97.8 F | DIASTOLIC BLOOD PRESSURE: 70 MMHG | HEIGHT: 70 IN | BODY MASS INDEX: 25.48 KG/M2 | WEIGHT: 178 LBS | SYSTOLIC BLOOD PRESSURE: 131 MMHG

## 2019-01-01 VITALS
WEIGHT: 205 LBS | TEMPERATURE: 98.2 F | BODY MASS INDEX: 29.35 KG/M2 | SYSTOLIC BLOOD PRESSURE: 180 MMHG | HEIGHT: 70 IN | DIASTOLIC BLOOD PRESSURE: 88 MMHG | OXYGEN SATURATION: 98 % | HEART RATE: 67 BPM

## 2019-01-01 VITALS
HEART RATE: 87 BPM | SYSTOLIC BLOOD PRESSURE: 102 MMHG | WEIGHT: 175 LBS | BODY MASS INDEX: 26.52 KG/M2 | DIASTOLIC BLOOD PRESSURE: 58 MMHG | HEIGHT: 68 IN | TEMPERATURE: 98.4 F | RESPIRATION RATE: 18 BRPM

## 2019-01-01 VITALS — WEIGHT: 178.1 LBS | BODY MASS INDEX: 26.3 KG/M2

## 2019-01-01 DIAGNOSIS — C7A.8 NEUROENDOCRINE CARCINOMA OF LUNG (HCC): Primary | ICD-10-CM

## 2019-01-01 DIAGNOSIS — R06.02 SHORTNESS OF BREATH: ICD-10-CM

## 2019-01-01 DIAGNOSIS — C7A.8 NEUROENDOCRINE CARCINOMA OF LUNG (HCC): ICD-10-CM

## 2019-01-01 DIAGNOSIS — J18.9 HCAP (HEALTHCARE-ASSOCIATED PNEUMONIA): Primary | ICD-10-CM

## 2019-01-01 DIAGNOSIS — I10 ESSENTIAL HYPERTENSION: ICD-10-CM

## 2019-01-01 DIAGNOSIS — I25.10 ARTERIOSCLEROSIS OF CORONARY ARTERY: Primary | ICD-10-CM

## 2019-01-01 DIAGNOSIS — Z00.00 MEDICARE ANNUAL WELLNESS VISIT, SUBSEQUENT: ICD-10-CM

## 2019-01-01 DIAGNOSIS — C7A.00 CARCINOID TUMOR, MALIGNANT (HCC): Primary | ICD-10-CM

## 2019-01-01 DIAGNOSIS — I25.10 ARTERIOSCLEROSIS OF CORONARY ARTERY: ICD-10-CM

## 2019-01-01 DIAGNOSIS — J18.9 PNEUMONIA OF RIGHT LUNG DUE TO INFECTIOUS ORGANISM, UNSPECIFIED PART OF LUNG: ICD-10-CM

## 2019-01-01 DIAGNOSIS — I47.1 SVT (SUPRAVENTRICULAR TACHYCARDIA) (HCC): ICD-10-CM

## 2019-01-01 DIAGNOSIS — C7A.00 CARCINOID TUMOR, MALIGNANT (HCC): ICD-10-CM

## 2019-01-01 DIAGNOSIS — N18.9 CRF (CHRONIC RENAL FAILURE), UNSPECIFIED STAGE: Primary | ICD-10-CM

## 2019-01-01 DIAGNOSIS — Z51.81 MEDICATION MONITORING ENCOUNTER: ICD-10-CM

## 2019-01-01 DIAGNOSIS — Z23 IMMUNIZATION DUE: Primary | ICD-10-CM

## 2019-01-01 DIAGNOSIS — E11.9 TYPE 2 DIABETES MELLITUS WITHOUT COMPLICATION, WITHOUT LONG-TERM CURRENT USE OF INSULIN (HCC): Primary | ICD-10-CM

## 2019-01-01 DIAGNOSIS — K59.00 CONSTIPATION, UNSPECIFIED CONSTIPATION TYPE: Primary | ICD-10-CM

## 2019-01-01 DIAGNOSIS — M15.9 GENERALIZED OSTEOARTHROSIS, INVOLVING MULTIPLE SITES: ICD-10-CM

## 2019-01-01 DIAGNOSIS — J40 BRONCHITIS: Primary | ICD-10-CM

## 2019-01-01 DIAGNOSIS — R60.0 LOCALIZED EDEMA: ICD-10-CM

## 2019-01-01 DIAGNOSIS — C34.91 MALIGNANT NEOPLASM OF RIGHT LUNG, UNSPECIFIED PART OF LUNG (HCC): ICD-10-CM

## 2019-01-01 DIAGNOSIS — M54.9 UPPER BACK PAIN ON RIGHT SIDE: Primary | ICD-10-CM

## 2019-01-01 DIAGNOSIS — Z13.1 SCREENING FOR DIABETES MELLITUS (DM): ICD-10-CM

## 2019-01-01 DIAGNOSIS — E78.00 HIGH CHOLESTEROL: ICD-10-CM

## 2019-01-01 DIAGNOSIS — R41.89 COGNITIVE IMPAIRMENT: ICD-10-CM

## 2019-01-01 DIAGNOSIS — E78.5 HYPERLIPIDEMIA, UNSPECIFIED HYPERLIPIDEMIA TYPE: ICD-10-CM

## 2019-01-01 DIAGNOSIS — J91.0 MALIGNANT PLEURAL EFFUSION: ICD-10-CM

## 2019-01-01 DIAGNOSIS — M54.9 UPPER BACK PAIN ON RIGHT SIDE: ICD-10-CM

## 2019-01-01 DIAGNOSIS — Z85.820 HISTORY OF MELANOMA: ICD-10-CM

## 2019-01-01 DIAGNOSIS — M51.34 DEGENERATIVE DISC DISEASE, THORACIC: ICD-10-CM

## 2019-01-01 DIAGNOSIS — C34.90 SMALL CELL LUNG CANCER (HCC): Primary | ICD-10-CM

## 2019-01-01 DIAGNOSIS — C79.31 METASTASIS TO BRAIN (HCC): ICD-10-CM

## 2019-01-01 DIAGNOSIS — R73.02 IMPAIRED GLUCOSE TOLERANCE: ICD-10-CM

## 2019-01-01 DIAGNOSIS — J90 PLEURAL EFFUSION: Primary | ICD-10-CM

## 2019-01-01 DIAGNOSIS — E78.5 ACQUIRED HYPERLIPOPROTEINEMIA: ICD-10-CM

## 2019-01-01 DIAGNOSIS — C34.90 PRIMARY MALIGNANT NEOPLASM OF LUNG METASTATIC TO OTHER SITE, UNSPECIFIED LATERALITY (HCC): ICD-10-CM

## 2019-01-01 DIAGNOSIS — N18.9 CRF (CHRONIC RENAL FAILURE), UNSPECIFIED STAGE: ICD-10-CM

## 2019-01-01 DIAGNOSIS — J98.11 ATELECTASIS, RIGHT: ICD-10-CM

## 2019-01-01 DIAGNOSIS — R06.03 RESPIRATORY DISTRESS: ICD-10-CM

## 2019-01-01 LAB
ALBUMIN FLD-MCNC: 2.9 G/DL
ALBUMIN SERPL-MCNC: 2.6 G/DL (ref 3.5–5.2)
ALBUMIN SERPL-MCNC: 2.9 G/DL (ref 3.5–5.2)
ALBUMIN SERPL-MCNC: 3.1 G/DL (ref 3.5–5.2)
ALBUMIN SERPL-MCNC: 3.4 G/DL (ref 3.5–5.2)
ALBUMIN SERPL-MCNC: 3.6 G/DL (ref 3.5–5.2)
ALBUMIN SERPL-MCNC: 3.7 G/DL (ref 3.5–5.2)
ALBUMIN SERPL-MCNC: 3.7 G/DL (ref 3.5–5.2)
ALBUMIN SERPL-MCNC: 3.8 G/DL (ref 3.5–5.2)
ALBUMIN SERPL-MCNC: 4 G/DL (ref 3.5–5.2)
ALBUMIN SERPL-MCNC: 4.1 G/DL (ref 3.5–5.2)
ALBUMIN SERPL-MCNC: 4.1 G/DL (ref 3.5–5.2)
ALBUMIN SERPL-MCNC: 4.2 G/DL (ref 3.5–5.2)
ALBUMIN SERPL-MCNC: 4.4 G/DL (ref 3.5–5)
ALBUMIN SERPL-MCNC: 4.5 G/DL (ref 3.5–5.2)
ALBUMIN/GLOB SERPL: 0.8 G/DL
ALBUMIN/GLOB SERPL: 0.8 G/DL
ALBUMIN/GLOB SERPL: 1.1 G/DL
ALBUMIN/GLOB SERPL: 1.2 G/DL
ALBUMIN/GLOB SERPL: 1.3 G/DL
ALBUMIN/GLOB SERPL: 1.4 G/DL
ALBUMIN/GLOB SERPL: 1.5 G/DL
ALBUMIN/GLOB SERPL: 1.6 G/DL
ALBUMIN/GLOB SERPL: 1.7 G/DL
ALBUMIN/GLOB SERPL: 1.8 G/DL (ref 1–2)
ALP SERPL-CCNC: 104 U/L (ref 39–117)
ALP SERPL-CCNC: 111 U/L (ref 39–117)
ALP SERPL-CCNC: 112 U/L (ref 39–117)
ALP SERPL-CCNC: 133 U/L (ref 39–117)
ALP SERPL-CCNC: 75 U/L (ref 39–117)
ALP SERPL-CCNC: 76 U/L (ref 38–126)
ALP SERPL-CCNC: 79 U/L (ref 39–117)
ALP SERPL-CCNC: 81 U/L (ref 39–117)
ALP SERPL-CCNC: 85 U/L (ref 39–117)
ALP SERPL-CCNC: 87 U/L (ref 39–117)
ALP SERPL-CCNC: 89 U/L (ref 39–117)
ALP SERPL-CCNC: 90 U/L (ref 39–117)
ALP SERPL-CCNC: 93 U/L (ref 39–117)
ALP SERPL-CCNC: 96 U/L (ref 39–117)
ALP SERPL-CCNC: 98 U/L (ref 39–117)
ALP SERPL-CCNC: 98 U/L (ref 39–117)
ALT SERPL W P-5'-P-CCNC: 10 U/L (ref 1–41)
ALT SERPL W P-5'-P-CCNC: 10 U/L (ref 1–41)
ALT SERPL W P-5'-P-CCNC: 11 U/L (ref 1–41)
ALT SERPL W P-5'-P-CCNC: 12 U/L (ref 1–41)
ALT SERPL W P-5'-P-CCNC: 13 U/L (ref 1–41)
ALT SERPL W P-5'-P-CCNC: 13 U/L (ref 1–41)
ALT SERPL W P-5'-P-CCNC: 14 U/L (ref 1–41)
ALT SERPL W P-5'-P-CCNC: 28 U/L (ref 1–41)
ALT SERPL W P-5'-P-CCNC: 31 U/L (ref 1–41)
ALT SERPL W P-5'-P-CCNC: 35 U/L (ref 13–69)
ALT SERPL W P-5'-P-CCNC: 57 U/L (ref 1–41)
ALT SERPL W P-5'-P-CCNC: 6 U/L (ref 1–41)
ALT SERPL W P-5'-P-CCNC: 7 U/L (ref 1–41)
ALT SERPL W P-5'-P-CCNC: 8 U/L (ref 1–41)
ALT SERPL W P-5'-P-CCNC: 8 U/L (ref 1–41)
ALT SERPL W P-5'-P-CCNC: 9 U/L (ref 1–41)
ANION GAP SERPL CALCULATED.3IONS-SCNC: 10 MMOL/L
ANION GAP SERPL CALCULATED.3IONS-SCNC: 10 MMOL/L
ANION GAP SERPL CALCULATED.3IONS-SCNC: 10 MMOL/L (ref 5–15)
ANION GAP SERPL CALCULATED.3IONS-SCNC: 11 MMOL/L
ANION GAP SERPL CALCULATED.3IONS-SCNC: 11 MMOL/L (ref 5–15)
ANION GAP SERPL CALCULATED.3IONS-SCNC: 13 MMOL/L
ANION GAP SERPL CALCULATED.3IONS-SCNC: 13 MMOL/L (ref 5–15)
ANION GAP SERPL CALCULATED.3IONS-SCNC: 13 MMOL/L (ref 5–15)
ANION GAP SERPL CALCULATED.3IONS-SCNC: 13.9 MMOL/L (ref 10–20)
ANION GAP SERPL CALCULATED.3IONS-SCNC: 14 MMOL/L
ANION GAP SERPL CALCULATED.3IONS-SCNC: 9 MMOL/L (ref 5–15)
APPEARANCE FLD: ABNORMAL
APTT PPP: 34.5 SECONDS (ref 24–37)
ARTERIAL PATENCY WRIST A: ABNORMAL
AST SERPL-CCNC: 12 U/L (ref 1–40)
AST SERPL-CCNC: 12 U/L (ref 1–40)
AST SERPL-CCNC: 14 U/L (ref 1–40)
AST SERPL-CCNC: 15 U/L (ref 1–40)
AST SERPL-CCNC: 16 U/L (ref 1–40)
AST SERPL-CCNC: 16 U/L (ref 1–40)
AST SERPL-CCNC: 17 U/L (ref 1–40)
AST SERPL-CCNC: 19 U/L (ref 1–40)
AST SERPL-CCNC: 19 U/L (ref 1–40)
AST SERPL-CCNC: 21 U/L (ref 1–40)
AST SERPL-CCNC: 23 U/L (ref 1–40)
AST SERPL-CCNC: 25 U/L (ref 1–40)
AST SERPL-CCNC: 30 U/L (ref 1–40)
AST SERPL-CCNC: 36 U/L (ref 15–46)
AST SERPL-CCNC: 40 U/L (ref 1–40)
AST SERPL-CCNC: 44 U/L (ref 1–40)
ATMOSPHERIC PRESS: ABNORMAL MM[HG]
ATMOSPHERIC PRESS: ABNORMAL MM[HG]
BACTERIA FLD CULT: NORMAL
BACTERIA SPEC AEROBE CULT: NORMAL
BACTERIA UR QL AUTO: NORMAL /HPF
BASE EXCESS BLDA CALC-SCNC: -1.2 MMOL/L (ref 0–2)
BASE EXCESS BLDA CALC-SCNC: 1.8 MMOL/L (ref 0–2)
BASOPHILS # BLD AUTO: 0.01 10*3/MM3 (ref 0–0.2)
BASOPHILS # BLD AUTO: 0.01 10*3/MM3 (ref 0–0.2)
BASOPHILS # BLD AUTO: 0.02 10*3/MM3 (ref 0–0.2)
BASOPHILS # BLD AUTO: 0.03 10*3/MM3 (ref 0–0.2)
BASOPHILS # BLD AUTO: 0.07 10*3/MM3 (ref 0–0.2)
BASOPHILS # BLD AUTO: 0.07 10*3/MM3 (ref 0–0.2)
BASOPHILS NFR BLD AUTO: 0 % (ref 0–1.5)
BASOPHILS NFR BLD AUTO: 0.1 % (ref 0–1.5)
BASOPHILS NFR BLD AUTO: 0.1 % (ref 0–1.5)
BASOPHILS NFR BLD AUTO: 0.5 % (ref 0–1.5)
BASOPHILS NFR BLD AUTO: 1 % (ref 0–1.5)
BASOPHILS NFR BLD AUTO: 1 % (ref 0–1.5)
BDY SITE: ABNORMAL
BDY SITE: ABNORMAL
BH CV ECHO MEAS - AO MAX PG (FULL): 11.4 MMHG
BH CV ECHO MEAS - AO MAX PG: 20.1 MMHG
BH CV ECHO MEAS - AO MEAN PG (FULL): 6.1 MMHG
BH CV ECHO MEAS - AO MEAN PG: 10.8 MMHG
BH CV ECHO MEAS - AO ROOT AREA (BSA CORRECTED): 1.8
BH CV ECHO MEAS - AO ROOT AREA: 9.8 CM^2
BH CV ECHO MEAS - AO ROOT DIAM: 3.5 CM
BH CV ECHO MEAS - AO V2 MAX: 224.1 CM/SEC
BH CV ECHO MEAS - AO V2 MEAN: 152.8 CM/SEC
BH CV ECHO MEAS - AO V2 VTI: 46.4 CM
BH CV ECHO MEAS - ASC AORTA: 3.2 CM
BH CV ECHO MEAS - AVA(I,A): 2.2 CM^2
BH CV ECHO MEAS - AVA(I,D): 2.2 CM^2
BH CV ECHO MEAS - AVA(V,A): 2.3 CM^2
BH CV ECHO MEAS - AVA(V,D): 2.3 CM^2
BH CV ECHO MEAS - BSA(HAYCOCK): 2 M^2
BH CV ECHO MEAS - BSA: 2 M^2
BH CV ECHO MEAS - BZI_BMI: 24.8 KILOGRAMS/M^2
BH CV ECHO MEAS - BZI_METRIC_HEIGHT: 177.8 CM
BH CV ECHO MEAS - BZI_METRIC_WEIGHT: 78.5 KG
BH CV ECHO MEAS - EDV(CUBED): 20.1 ML
BH CV ECHO MEAS - EDV(MOD-SP2): 86 ML
BH CV ECHO MEAS - EDV(MOD-SP4): 91 ML
BH CV ECHO MEAS - EDV(TEICH): 27.5 ML
BH CV ECHO MEAS - EF(CUBED): 81.9 %
BH CV ECHO MEAS - EF(MOD-BP): 70 %
BH CV ECHO MEAS - EF(MOD-SP2): 69.8 %
BH CV ECHO MEAS - EF(MOD-SP4): 68.1 %
BH CV ECHO MEAS - EF(TEICH): 76.4 %
BH CV ECHO MEAS - ESV(CUBED): 3.6 ML
BH CV ECHO MEAS - ESV(MOD-SP2): 26 ML
BH CV ECHO MEAS - ESV(MOD-SP4): 29 ML
BH CV ECHO MEAS - ESV(TEICH): 6.5 ML
BH CV ECHO MEAS - FS: 43.4 %
BH CV ECHO MEAS - IVS/LVPW: 1.4
BH CV ECHO MEAS - IVSD: 1.4 CM
BH CV ECHO MEAS - LA DIMENSION: 3.3 CM
BH CV ECHO MEAS - LA/AO: 0.93
BH CV ECHO MEAS - LAD MAJOR: 5.7 CM
BH CV ECHO MEAS - LAT PEAK E' VEL: 11.6 CM/SEC
BH CV ECHO MEAS - LATERAL E/E' RATIO: 6.3
BH CV ECHO MEAS - LV DIASTOLIC VOL/BSA (35-75): 46.4 ML/M^2
BH CV ECHO MEAS - LV MASS(C)D: 97.3 GRAMS
BH CV ECHO MEAS - LV MASS(C)DI: 49.6 GRAMS/M^2
BH CV ECHO MEAS - LV MAX PG: 8.7 MMHG
BH CV ECHO MEAS - LV MEAN PG: 4.7 MMHG
BH CV ECHO MEAS - LV SYSTOLIC VOL/BSA (12-30): 14.8 ML/M^2
BH CV ECHO MEAS - LV V1 MAX: 147.5 CM/SEC
BH CV ECHO MEAS - LV V1 MEAN: 99.9 CM/SEC
BH CV ECHO MEAS - LV V1 VTI: 30.3 CM
BH CV ECHO MEAS - LVIDD: 2.7 CM
BH CV ECHO MEAS - LVIDS: 1.5 CM
BH CV ECHO MEAS - LVLD AP2: 8.5 CM
BH CV ECHO MEAS - LVLD AP4: 9.3 CM
BH CV ECHO MEAS - LVLS AP2: 7.1 CM
BH CV ECHO MEAS - LVLS AP4: 7.4 CM
BH CV ECHO MEAS - LVOT AREA (M): 3.5 CM^2
BH CV ECHO MEAS - LVOT AREA: 3.4 CM^2
BH CV ECHO MEAS - LVOT DIAM: 2.1 CM
BH CV ECHO MEAS - LVPWD: 1 CM
BH CV ECHO MEAS - MED PEAK E' VEL: 5 CM/SEC
BH CV ECHO MEAS - MEDIAL E/E' RATIO: 14.4
BH CV ECHO MEAS - MV A MAX VEL: 114 CM/SEC
BH CV ECHO MEAS - MV DEC SLOPE: 389.2 CM/SEC^2
BH CV ECHO MEAS - MV E MAX VEL: 74.5 CM/SEC
BH CV ECHO MEAS - MV E/A: 0.65
BH CV ECHO MEAS - MV MAX PG: 6.2 MMHG
BH CV ECHO MEAS - MV MEAN PG: 3 MMHG
BH CV ECHO MEAS - MV V2 MAX: 124.2 CM/SEC
BH CV ECHO MEAS - MV V2 MEAN: 82.7 CM/SEC
BH CV ECHO MEAS - MV V2 VTI: 32.6 CM
BH CV ECHO MEAS - MVA(VTI): 3.2 CM^2
BH CV ECHO MEAS - PA ACC SLOPE: 1349 CM/SEC^2
BH CV ECHO MEAS - PA ACC TIME: 0.08 SEC
BH CV ECHO MEAS - PA MAX PG: 5.3 MMHG
BH CV ECHO MEAS - PA PR(ACCEL): 41 MMHG
BH CV ECHO MEAS - PA V2 MAX: 115.1 CM/SEC
BH CV ECHO MEAS - RAP SYSTOLE: 3 MMHG
BH CV ECHO MEAS - RVSP: 19 MMHG
BH CV ECHO MEAS - SI(AO): 232.8 ML/M^2
BH CV ECHO MEAS - SI(CUBED): 8.4 ML/M^2
BH CV ECHO MEAS - SI(LVOT): 53 ML/M^2
BH CV ECHO MEAS - SI(MOD-SP2): 30.6 ML/M^2
BH CV ECHO MEAS - SI(MOD-SP4): 31.6 ML/M^2
BH CV ECHO MEAS - SI(TEICH): 10.7 ML/M^2
BH CV ECHO MEAS - SV(AO): 456.8 ML
BH CV ECHO MEAS - SV(CUBED): 16.5 ML
BH CV ECHO MEAS - SV(LVOT): 104 ML
BH CV ECHO MEAS - SV(MOD-SP2): 60 ML
BH CV ECHO MEAS - SV(MOD-SP4): 62 ML
BH CV ECHO MEAS - SV(TEICH): 21 ML
BH CV ECHO MEAS - TAPSE (>1.6): 1.6 CM2
BH CV ECHO MEAS - TR MAX PG: 16 MMHG
BH CV ECHO MEAS - TR MAX VEL: 198 CM/SEC
BH CV ECHO MEASUREMENTS AVERAGE E/E' RATIO: 8.98
BH CV UPPER VENOUS LEFT INTERNAL JUGULAR COMPRESS: NORMAL
BH CV UPPER VENOUS LEFT INTERNAL JUGULAR PHASIC: NORMAL
BH CV UPPER VENOUS LEFT INTERNAL JUGULAR SPONT: NORMAL
BH CV UPPER VENOUS RIGHT AXILLARY COLOR: 1
BH CV UPPER VENOUS RIGHT AXILLARY COMPRESS: NORMAL
BH CV UPPER VENOUS RIGHT AXILLARY PHASIC: NORMAL
BH CV UPPER VENOUS RIGHT AXILLARY SPONT: NORMAL
BH CV UPPER VENOUS RIGHT BASILIC FOREARM COMPRESS: NORMAL
BH CV UPPER VENOUS RIGHT BASILIC UPPER COLOR: 1
BH CV UPPER VENOUS RIGHT BASILIC UPPER COMPRESS: NORMAL
BH CV UPPER VENOUS RIGHT BASILIC UPPER PHASIC: NORMAL
BH CV UPPER VENOUS RIGHT BASILIC UPPER SPONT: NORMAL
BH CV UPPER VENOUS RIGHT BRACHIAL COMPRESS: NORMAL
BH CV UPPER VENOUS RIGHT CEPHALIC FOREARM COMPRESS: NORMAL
BH CV UPPER VENOUS RIGHT CEPHALIC UPPER COMPRESS: NORMAL
BH CV UPPER VENOUS RIGHT INTERNAL JUGULAR COMPRESS: NORMAL
BH CV UPPER VENOUS RIGHT INTERNAL JUGULAR PHASIC: NORMAL
BH CV UPPER VENOUS RIGHT INTERNAL JUGULAR SPONT: NORMAL
BH CV UPPER VENOUS RIGHT RADIAL COMPRESS: NORMAL
BH CV UPPER VENOUS RIGHT SUBCLAVIAN COMPRESS: NORMAL
BH CV UPPER VENOUS RIGHT SUBCLAVIAN PHASIC: NORMAL
BH CV UPPER VENOUS RIGHT SUBCLAVIAN SPONT: NORMAL
BH CV UPPER VENOUS RIGHT ULNAR COMPRESS: NORMAL
BH CV VAS BP RIGHT ARM: NORMAL MMHG
BH CV XLRA - RV BASE: 3.9 CM
BH CV XLRA - RV LENGTH: 6.4 CM
BH CV XLRA - RV MID: 3.3 CM
BH CV XLRA - TDI S': 10.9 CM/SEC
BILIRUB SERPL-MCNC: 0.2 MG/DL (ref 0.2–1.2)
BILIRUB SERPL-MCNC: 0.3 MG/DL (ref 0.2–1.2)
BILIRUB SERPL-MCNC: 0.3 MG/DL (ref 0.2–1.2)
BILIRUB SERPL-MCNC: 0.4 MG/DL (ref 0.2–1.2)
BILIRUB SERPL-MCNC: 0.4 MG/DL (ref 0.2–1.2)
BILIRUB SERPL-MCNC: 0.6 MG/DL (ref 0.2–1.2)
BILIRUB SERPL-MCNC: 0.7 MG/DL (ref 0.2–1.2)
BILIRUB SERPL-MCNC: 0.8 MG/DL (ref 0.2–1.2)
BILIRUB SERPL-MCNC: 0.8 MG/DL (ref 0.2–1.3)
BILIRUB SERPL-MCNC: 0.9 MG/DL (ref 0.2–1.2)
BILIRUB SERPL-MCNC: 1 MG/DL (ref 0.2–1.2)
BILIRUB UR QL STRIP: NEGATIVE
BODY TEMPERATURE: 37 C
BUN BLD-MCNC: 10 MG/DL (ref 8–23)
BUN BLD-MCNC: 11 MG/DL (ref 8–23)
BUN BLD-MCNC: 13 MG/DL (ref 8–23)
BUN BLD-MCNC: 14 MG/DL (ref 8–23)
BUN BLD-MCNC: 15 MG/DL (ref 8–23)
BUN BLD-MCNC: 15 MG/DL (ref 8–23)
BUN BLD-MCNC: 16 MG/DL (ref 8–23)
BUN BLD-MCNC: 17 MG/DL (ref 7–20)
BUN BLD-MCNC: 17 MG/DL (ref 8–23)
BUN BLD-MCNC: 17 MG/DL (ref 8–23)
BUN BLD-MCNC: 25 MG/DL (ref 8–23)
BUN BLD-MCNC: 29 MG/DL (ref 8–23)
BUN BLD-MCNC: 30 MG/DL (ref 8–23)
BUN BLD-MCNC: 30 MG/DL (ref 8–23)
BUN BLD-MCNC: 34 MG/DL (ref 8–23)
BUN BLD-MCNC: 6 MG/DL (ref 8–23)
BUN BLD-MCNC: 6 MG/DL (ref 8–23)
BUN/CREAT SERPL: 10.9 (ref 7–25)
BUN/CREAT SERPL: 12 (ref 7–25)
BUN/CREAT SERPL: 14 (ref 7–25)
BUN/CREAT SERPL: 14.7 (ref 7–25)
BUN/CREAT SERPL: 14.9 (ref 7–25)
BUN/CREAT SERPL: 15.1 (ref 7–25)
BUN/CREAT SERPL: 15.5 (ref 6.3–21.9)
BUN/CREAT SERPL: 15.9 (ref 7–25)
BUN/CREAT SERPL: 16.7 (ref 7–25)
BUN/CREAT SERPL: 18 (ref 7–25)
BUN/CREAT SERPL: 18.1 (ref 7–25)
BUN/CREAT SERPL: 18.8 (ref 7–25)
BUN/CREAT SERPL: 20.3 (ref 7–25)
BUN/CREAT SERPL: 20.5 (ref 7–25)
BUN/CREAT SERPL: 25.7 (ref 7–25)
BUN/CREAT SERPL: 28.1 (ref 7–25)
BUN/CREAT SERPL: 41.5 (ref 7–25)
BUN/CREAT SERPL: 43.5 (ref 7–25)
BUN/CREAT SERPL: 53.6 (ref 7–25)
BUN/CREAT SERPL: 9.4 (ref 7–25)
CALCIUM SPEC-SCNC: 10 MG/DL (ref 8.4–10.2)
CALCIUM SPEC-SCNC: 10.1 MG/DL (ref 8.6–10.5)
CALCIUM SPEC-SCNC: 10.1 MG/DL (ref 8.6–10.5)
CALCIUM SPEC-SCNC: 8.3 MG/DL (ref 8.6–10.5)
CALCIUM SPEC-SCNC: 8.3 MG/DL (ref 8.6–10.5)
CALCIUM SPEC-SCNC: 8.6 MG/DL (ref 8.6–10.5)
CALCIUM SPEC-SCNC: 8.7 MG/DL (ref 8.6–10.5)
CALCIUM SPEC-SCNC: 8.8 MG/DL (ref 8.6–10.5)
CALCIUM SPEC-SCNC: 8.9 MG/DL (ref 8.6–10.5)
CALCIUM SPEC-SCNC: 9 MG/DL (ref 8.6–10.5)
CALCIUM SPEC-SCNC: 9.1 MG/DL (ref 8.6–10.5)
CALCIUM SPEC-SCNC: 9.3 MG/DL (ref 8.6–10.5)
CALCIUM SPEC-SCNC: 9.4 MG/DL (ref 8.6–10.5)
CALCIUM SPEC-SCNC: 9.5 MG/DL (ref 8.6–10.5)
CALCIUM SPEC-SCNC: 9.6 MG/DL (ref 8.6–10.5)
CALCIUM SPEC-SCNC: 9.6 MG/DL (ref 8.6–10.5)
CALCIUM SPEC-SCNC: 9.8 MG/DL (ref 8.6–10.5)
CALCIUM SPEC-SCNC: 9.8 MG/DL (ref 8.6–10.5)
CHLORIDE SERPL-SCNC: 100 MMOL/L (ref 98–107)
CHLORIDE SERPL-SCNC: 101 MMOL/L (ref 98–107)
CHLORIDE SERPL-SCNC: 101 MMOL/L (ref 98–107)
CHLORIDE SERPL-SCNC: 102 MMOL/L (ref 98–107)
CHLORIDE SERPL-SCNC: 103 MMOL/L (ref 98–107)
CHLORIDE SERPL-SCNC: 104 MMOL/L (ref 98–107)
CHLORIDE SERPL-SCNC: 105 MMOL/L (ref 98–107)
CHLORIDE SERPL-SCNC: 105 MMOL/L (ref 98–107)
CHLORIDE SERPL-SCNC: 94 MMOL/L (ref 98–107)
CHLORIDE SERPL-SCNC: 94 MMOL/L (ref 98–107)
CHLORIDE SERPL-SCNC: 95 MMOL/L (ref 98–107)
CHLORIDE SERPL-SCNC: 95 MMOL/L (ref 98–107)
CHLORIDE SERPL-SCNC: 97 MMOL/L (ref 98–107)
CHLORIDE SERPL-SCNC: 97 MMOL/L (ref 98–107)
CHLORIDE SERPL-SCNC: 98 MMOL/L (ref 98–107)
CHLORIDE SERPL-SCNC: 98 MMOL/L (ref 98–107)
CHOLEST SERPL-MCNC: 187 MG/DL (ref 0–199)
CLARITY UR: CLEAR
CO2 BLDA-SCNC: 24.1 MMOL/L (ref 22–33)
CO2 BLDA-SCNC: 26.4 MMOL/L (ref 22–33)
CO2 SERPL-SCNC: 20 MMOL/L (ref 22–29)
CO2 SERPL-SCNC: 21 MMOL/L (ref 22–29)
CO2 SERPL-SCNC: 22 MMOL/L (ref 22–29)
CO2 SERPL-SCNC: 23 MMOL/L (ref 22–29)
CO2 SERPL-SCNC: 25 MMOL/L (ref 22–29)
CO2 SERPL-SCNC: 26 MMOL/L (ref 22–29)
CO2 SERPL-SCNC: 28 MMOL/L (ref 22–29)
CO2 SERPL-SCNC: 28 MMOL/L (ref 26–30)
COHGB MFR BLD: 1.2 % (ref 0–2)
COHGB MFR BLD: 1.3 % (ref 0–2)
COLOR FLD: ABNORMAL
COLOR UR: YELLOW
CREAT BLD-MCNC: 0.55 MG/DL (ref 0.76–1.27)
CREAT BLD-MCNC: 0.56 MG/DL (ref 0.76–1.27)
CREAT BLD-MCNC: 0.64 MG/DL (ref 0.76–1.27)
CREAT BLD-MCNC: 0.66 MG/DL (ref 0.76–1.27)
CREAT BLD-MCNC: 0.69 MG/DL (ref 0.76–1.27)
CREAT BLD-MCNC: 0.73 MG/DL (ref 0.76–1.27)
CREAT BLD-MCNC: 0.74 MG/DL (ref 0.76–1.27)
CREAT BLD-MCNC: 0.74 MG/DL (ref 0.76–1.27)
CREAT BLD-MCNC: 0.75 MG/DL (ref 0.76–1.27)
CREAT BLD-MCNC: 0.8 MG/DL (ref 0.76–1.27)
CREAT BLD-MCNC: 0.82 MG/DL (ref 0.76–1.27)
CREAT BLD-MCNC: 0.83 MG/DL (ref 0.76–1.27)
CREAT BLD-MCNC: 0.83 MG/DL (ref 0.76–1.27)
CREAT BLD-MCNC: 0.88 MG/DL (ref 0.76–1.27)
CREAT BLD-MCNC: 0.89 MG/DL (ref 0.76–1.27)
CREAT BLD-MCNC: 0.89 MG/DL (ref 0.76–1.27)
CREAT BLD-MCNC: 0.93 MG/DL (ref 0.76–1.27)
CREAT BLD-MCNC: 0.94 MG/DL (ref 0.76–1.27)
CREAT BLD-MCNC: 1.1 MG/DL (ref 0.6–1.3)
CREAT BLD-MCNC: 1.13 MG/DL (ref 0.76–1.27)
CREAT BLDA-MCNC: 0.7 MG/DL (ref 0.6–1.3)
CREAT BLDA-MCNC: 0.7 MG/DL (ref 0.6–1.3)
D-LACTATE SERPL-SCNC: 0.9 MMOL/L (ref 0.5–2)
D-LACTATE SERPL-SCNC: 1.5 MMOL/L (ref 0.5–2)
DEPRECATED RDW RBC AUTO: 45.7 FL (ref 37–54)
DEPRECATED RDW RBC AUTO: 46.5 FL (ref 37–54)
DEPRECATED RDW RBC AUTO: 46.5 FL (ref 37–54)
DEPRECATED RDW RBC AUTO: 47.1 FL (ref 37–54)
DEPRECATED RDW RBC AUTO: 47.2 FL (ref 37–54)
DEPRECATED RDW RBC AUTO: 47.3 FL (ref 37–54)
DEPRECATED RDW RBC AUTO: 47.6 FL (ref 37–54)
DEPRECATED RDW RBC AUTO: 48.4 FL (ref 37–54)
EOSINOPHIL # BLD AUTO: 0 10*3/MM3 (ref 0–0.4)
EOSINOPHIL # BLD AUTO: 0 10*3/MM3 (ref 0–0.4)
EOSINOPHIL # BLD AUTO: 0.02 10*3/MM3 (ref 0–0.4)
EOSINOPHIL # BLD AUTO: 0.09 10*3/MM3 (ref 0–0.4)
EOSINOPHIL # BLD AUTO: 0.14 10*3/MM3 (ref 0–0.4)
EOSINOPHIL # BLD AUTO: 0.26 10*3/MM3 (ref 0–0.4)
EOSINOPHIL NFR BLD AUTO: 0 % (ref 0.3–6.2)
EOSINOPHIL NFR BLD AUTO: 0 % (ref 0.3–6.2)
EOSINOPHIL NFR BLD AUTO: 0.1 % (ref 0.3–6.2)
EOSINOPHIL NFR BLD AUTO: 1.2 % (ref 0.3–6.2)
EOSINOPHIL NFR BLD AUTO: 2.2 % (ref 0.3–6.2)
EOSINOPHIL NFR BLD AUTO: 3.5 % (ref 0.3–6.2)
ERYTHROCYTE [DISTWIDTH] IN BLOOD BY AUTOMATED COUNT: 13.3 % (ref 12.3–15.4)
ERYTHROCYTE [DISTWIDTH] IN BLOOD BY AUTOMATED COUNT: 13.8 % (ref 12.3–15.4)
ERYTHROCYTE [DISTWIDTH] IN BLOOD BY AUTOMATED COUNT: 13.9 % (ref 12.3–15.4)
ERYTHROCYTE [DISTWIDTH] IN BLOOD BY AUTOMATED COUNT: 13.9 % (ref 12.3–15.4)
ERYTHROCYTE [DISTWIDTH] IN BLOOD BY AUTOMATED COUNT: 14.3 % (ref 12.3–15.4)
ERYTHROCYTE [DISTWIDTH] IN BLOOD BY AUTOMATED COUNT: 14.3 % (ref 12.3–15.4)
ERYTHROCYTE [DISTWIDTH] IN BLOOD BY AUTOMATED COUNT: 14.4 % (ref 12.3–15.4)
ERYTHROCYTE [DISTWIDTH] IN BLOOD BY AUTOMATED COUNT: 14.4 % (ref 12.3–15.4)
ERYTHROCYTE [DISTWIDTH] IN BLOOD BY AUTOMATED COUNT: 14.5 % (ref 12.3–15.4)
ERYTHROCYTE [DISTWIDTH] IN BLOOD BY AUTOMATED COUNT: 14.5 % (ref 12.3–15.4)
ERYTHROCYTE [DISTWIDTH] IN BLOOD BY AUTOMATED COUNT: 14.7 % (ref 12.3–15.4)
ERYTHROCYTE [DISTWIDTH] IN BLOOD BY AUTOMATED COUNT: 14.8 % (ref 12.3–15.4)
ERYTHROCYTE [DISTWIDTH] IN BLOOD BY AUTOMATED COUNT: 14.9 % (ref 12.3–15.4)
ERYTHROCYTE [DISTWIDTH] IN BLOOD BY AUTOMATED COUNT: 15 % (ref 12.3–15.4)
ERYTHROCYTE [DISTWIDTH] IN BLOOD BY AUTOMATED COUNT: 18.1 % (ref 12.3–15.4)
ERYTHROCYTE [DISTWIDTH] IN BLOOD BY AUTOMATED COUNT: 18.3 % (ref 12.3–15.4)
ERYTHROCYTE [DISTWIDTH] IN BLOOD BY AUTOMATED COUNT: 19.3 % (ref 12.3–15.4)
ERYTHROCYTE [DISTWIDTH] IN BLOOD BY AUTOMATED COUNT: 21.8 % (ref 12.3–15.4)
ERYTHROCYTE [DISTWIDTH] IN BLOOD BY AUTOMATED COUNT: 22.3 % (ref 12.3–15.4)
FLUAV AG NPH QL: NEGATIVE
FLUBV AG NPH QL IA: NEGATIVE
GFR SERPL CREATININE-BSD FRML MDRD: 100 ML/MIN/1.73
GFR SERPL CREATININE-BSD FRML MDRD: 100 ML/MIN/1.73
GFR SERPL CREATININE-BSD FRML MDRD: 102 ML/MIN/1.73
GFR SERPL CREATININE-BSD FRML MDRD: 109 ML/MIN/1.73
GFR SERPL CREATININE-BSD FRML MDRD: 114 ML/MIN/1.73
GFR SERPL CREATININE-BSD FRML MDRD: 118 ML/MIN/1.73
GFR SERPL CREATININE-BSD FRML MDRD: 138 ML/MIN/1.73
GFR SERPL CREATININE-BSD FRML MDRD: 141 ML/MIN/1.73
GFR SERPL CREATININE-BSD FRML MDRD: 62 ML/MIN/1.73
GFR SERPL CREATININE-BSD FRML MDRD: 63 ML/MIN/1.73
GFR SERPL CREATININE-BSD FRML MDRD: 76 ML/MIN/1.73
GFR SERPL CREATININE-BSD FRML MDRD: 77 ML/MIN/1.73
GFR SERPL CREATININE-BSD FRML MDRD: 81 ML/MIN/1.73
GFR SERPL CREATININE-BSD FRML MDRD: 81 ML/MIN/1.73
GFR SERPL CREATININE-BSD FRML MDRD: 82 ML/MIN/1.73
GFR SERPL CREATININE-BSD FRML MDRD: 88 ML/MIN/1.73
GFR SERPL CREATININE-BSD FRML MDRD: 88 ML/MIN/1.73
GFR SERPL CREATININE-BSD FRML MDRD: 89 ML/MIN/1.73
GFR SERPL CREATININE-BSD FRML MDRD: 91 ML/MIN/1.73
GFR SERPL CREATININE-BSD FRML MDRD: 99 ML/MIN/1.73
GLOBULIN UR ELPH-MCNC: 2.5 GM/DL
GLOBULIN UR ELPH-MCNC: 2.5 GM/DL
GLOBULIN UR ELPH-MCNC: 2.6 GM/DL
GLOBULIN UR ELPH-MCNC: 2.6 GM/DL
GLOBULIN UR ELPH-MCNC: 2.7 GM/DL
GLOBULIN UR ELPH-MCNC: 2.8 GM/DL
GLOBULIN UR ELPH-MCNC: 3 GM/DL
GLOBULIN UR ELPH-MCNC: 3.1 GM/DL
GLOBULIN UR ELPH-MCNC: 3.1 GM/DL
GLOBULIN UR ELPH-MCNC: 3.3 GM/DL
GLOBULIN UR ELPH-MCNC: 3.4 GM/DL
GLOBULIN UR ELPH-MCNC: 3.7 GM/DL
GLOBULIN UR ELPH-MCNC: 3.7 GM/DL
GLUCOSE BLD-MCNC: 101 MG/DL (ref 65–99)
GLUCOSE BLD-MCNC: 103 MG/DL (ref 65–99)
GLUCOSE BLD-MCNC: 107 MG/DL (ref 65–99)
GLUCOSE BLD-MCNC: 109 MG/DL (ref 65–99)
GLUCOSE BLD-MCNC: 110 MG/DL (ref 65–99)
GLUCOSE BLD-MCNC: 114 MG/DL (ref 65–99)
GLUCOSE BLD-MCNC: 124 MG/DL (ref 65–99)
GLUCOSE BLD-MCNC: 134 MG/DL (ref 65–99)
GLUCOSE BLD-MCNC: 158 MG/DL (ref 65–99)
GLUCOSE BLD-MCNC: 80 MG/DL (ref 65–99)
GLUCOSE BLD-MCNC: 87 MG/DL (ref 65–99)
GLUCOSE BLD-MCNC: 89 MG/DL (ref 65–99)
GLUCOSE BLD-MCNC: 91 MG/DL (ref 65–99)
GLUCOSE BLD-MCNC: 92 MG/DL (ref 65–99)
GLUCOSE BLD-MCNC: 93 MG/DL (ref 65–99)
GLUCOSE BLD-MCNC: 93 MG/DL (ref 65–99)
GLUCOSE BLD-MCNC: 95 MG/DL (ref 74–98)
GLUCOSE BLD-MCNC: 96 MG/DL (ref 65–99)
GLUCOSE BLD-MCNC: 97 MG/DL (ref 65–99)
GLUCOSE BLD-MCNC: 97 MG/DL (ref 65–99)
GLUCOSE BLDC GLUCOMTR-MCNC: 123 MG/DL (ref 70–130)
GLUCOSE FLD-MCNC: 99 MG/DL
GLUCOSE UR STRIP-MCNC: NEGATIVE MG/DL
GRAM STN SPEC: NORMAL
GRAM STN SPEC: NORMAL
HBA1C MFR BLD: 5 %
HBA1C MFR BLD: 5.8 % (ref 3–6)
HCO3 BLDA-SCNC: 23 MMOL/L (ref 20–26)
HCO3 BLDA-SCNC: 25.3 MMOL/L (ref 20–26)
HCT VFR BLD AUTO: 31.3 % (ref 37.5–51)
HCT VFR BLD AUTO: 31.9 % (ref 37.5–51)
HCT VFR BLD AUTO: 32.4 % (ref 37.5–51)
HCT VFR BLD AUTO: 34.2 % (ref 37.5–51)
HCT VFR BLD AUTO: 34.6 % (ref 37.5–51)
HCT VFR BLD AUTO: 34.7 % (ref 37.5–51)
HCT VFR BLD AUTO: 36.7 % (ref 37.5–51)
HCT VFR BLD AUTO: 36.7 % (ref 37.5–51)
HCT VFR BLD AUTO: 36.9 % (ref 37.5–51)
HCT VFR BLD AUTO: 37.6 % (ref 37.5–51)
HCT VFR BLD AUTO: 38.1 % (ref 37.5–51)
HCT VFR BLD AUTO: 38.2 % (ref 37.5–51)
HCT VFR BLD AUTO: 39.1 % (ref 37.5–51)
HCT VFR BLD AUTO: 39.5 % (ref 37.5–51)
HCT VFR BLD AUTO: 40.1 % (ref 37.5–51)
HCT VFR BLD AUTO: 40.5 % (ref 37.5–51)
HCT VFR BLD AUTO: 44.9 % (ref 37.5–51)
HCT VFR BLD AUTO: 46.7 % (ref 37.5–51)
HCT VFR BLD AUTO: 47.3 % (ref 37.5–51)
HCT VFR BLD CALC: 33.2 %
HCT VFR BLD CALC: 35.6 %
HDLC SERPL-MCNC: 49 MG/DL (ref 40–60)
HGB BLD-MCNC: 10.6 G/DL (ref 13–17.7)
HGB BLD-MCNC: 10.9 G/DL (ref 13–17.7)
HGB BLD-MCNC: 10.9 G/DL (ref 13–17.7)
HGB BLD-MCNC: 11.5 G/DL (ref 13–17.7)
HGB BLD-MCNC: 11.6 G/DL (ref 13–17.7)
HGB BLD-MCNC: 11.6 G/DL (ref 13–17.7)
HGB BLD-MCNC: 12.1 G/DL (ref 13–17.7)
HGB BLD-MCNC: 12.3 G/DL (ref 13–17.7)
HGB BLD-MCNC: 12.4 G/DL (ref 13–17.7)
HGB BLD-MCNC: 12.5 G/DL (ref 13–17.7)
HGB BLD-MCNC: 12.9 G/DL (ref 13–17.7)
HGB BLD-MCNC: 13 G/DL (ref 13–17.7)
HGB BLD-MCNC: 13.1 G/DL (ref 13–17.7)
HGB BLD-MCNC: 13.3 G/DL (ref 13–17.7)
HGB BLD-MCNC: 13.4 G/DL (ref 13–17.7)
HGB BLD-MCNC: 14.1 G/DL (ref 13–17.7)
HGB BLD-MCNC: 14.4 G/DL (ref 13–17.7)
HGB BLD-MCNC: 15.9 G/DL (ref 13–17.7)
HGB BLD-MCNC: 16.2 G/DL (ref 13–17.7)
HGB BLDA-MCNC: 10.8 G/DL (ref 13.5–17.5)
HGB BLDA-MCNC: 11.6 G/DL (ref 13.5–17.5)
HGB UR QL STRIP.AUTO: NEGATIVE
HOLD SPECIMEN: NORMAL
HOROWITZ INDEX BLD+IHG-RTO: 36 %
HOROWITZ INDEX BLD+IHG-RTO: 44 %
HYALINE CASTS UR QL AUTO: NORMAL /LPF
IMM GRANULOCYTES # BLD AUTO: 0.02 10*3/MM3 (ref 0–0.05)
IMM GRANULOCYTES # BLD AUTO: 0.02 10*3/MM3 (ref 0–0.05)
IMM GRANULOCYTES # BLD AUTO: 0.07 10*3/MM3 (ref 0–0.05)
IMM GRANULOCYTES # BLD AUTO: 0.08 10*3/MM3 (ref 0–0.05)
IMM GRANULOCYTES # BLD AUTO: 0.09 10*3/MM3 (ref 0–0.05)
IMM GRANULOCYTES # BLD AUTO: 0.16 10*3/MM3 (ref 0–0.05)
IMM GRANULOCYTES NFR BLD AUTO: 0.3 % (ref 0–0.5)
IMM GRANULOCYTES NFR BLD AUTO: 0.3 % (ref 0–0.5)
IMM GRANULOCYTES NFR BLD AUTO: 0.4 % (ref 0–0.5)
IMM GRANULOCYTES NFR BLD AUTO: 0.5 % (ref 0–0.5)
IMM GRANULOCYTES NFR BLD AUTO: 0.8 % (ref 0–0.5)
IMM GRANULOCYTES NFR BLD AUTO: 1 % (ref 0–0.5)
INR PPP: 1.05 (ref 0.85–1.16)
INR PPP: 1.1 (ref 0.85–1.16)
KETONES UR QL STRIP: NEGATIVE
LAB AP CASE REPORT: NORMAL
LDH FLD-CCNC: 414 U/L
LDH SERPL-CCNC: 305 U/L (ref 135–225)
LDH SERPL-CCNC: 332 U/L (ref 135–225)
LDLC SERPL CALC-MCNC: 116 MG/DL (ref 0–99)
LDLC/HDLC SERPL: 2.36 {RATIO}
LEFT ATRIUM VOLUME INDEX: 26 ML/M^2
LEFT ATRIUM VOLUME: 51 ML
LEUKOCYTE ESTERASE UR QL STRIP.AUTO: ABNORMAL
LYMPHOCYTES # BLD AUTO: 0.74 10*3/MM3 (ref 0.7–3.1)
LYMPHOCYTES # BLD AUTO: 1.03 10*3/MM3 (ref 0.7–3.1)
LYMPHOCYTES # BLD AUTO: 1.17 10*3/MM3 (ref 0.7–3.1)
LYMPHOCYTES # BLD AUTO: 1.23 10*3/MM3 (ref 0.7–3.1)
LYMPHOCYTES # BLD AUTO: 1.3 10*3/MM3 (ref 0.7–3.1)
LYMPHOCYTES # BLD AUTO: 1.4 10*3/MM3 (ref 0.7–3.1)
LYMPHOCYTES # BLD AUTO: 1.5 10*3/MM3 (ref 0.7–3.1)
LYMPHOCYTES # BLD AUTO: 1.6 10*3/MM3 (ref 0.7–3.1)
LYMPHOCYTES # BLD AUTO: 1.7 10*3/MM3 (ref 0.7–3.1)
LYMPHOCYTES # BLD AUTO: 1.87 10*3/MM3 (ref 0.7–3.1)
LYMPHOCYTES # BLD AUTO: 1.9 10*3/MM3 (ref 0.7–3.1)
LYMPHOCYTES # BLD AUTO: 2 10*3/MM3 (ref 0.7–3.1)
LYMPHOCYTES # BLD AUTO: 2.17 10*3/MM3 (ref 0.7–3.1)
LYMPHOCYTES NFR BLD AUTO: 11 % (ref 19.6–45.3)
LYMPHOCYTES NFR BLD AUTO: 16.2 % (ref 19.6–45.3)
LYMPHOCYTES NFR BLD AUTO: 18.8 % (ref 19.6–45.3)
LYMPHOCYTES NFR BLD AUTO: 19.1 % (ref 19.6–45.3)
LYMPHOCYTES NFR BLD AUTO: 20.9 % (ref 19.6–45.3)
LYMPHOCYTES NFR BLD AUTO: 24.5 % (ref 19.6–45.3)
LYMPHOCYTES NFR BLD AUTO: 25.5 % (ref 19.6–45.3)
LYMPHOCYTES NFR BLD AUTO: 26.3 % (ref 19.6–45.3)
LYMPHOCYTES NFR BLD AUTO: 26.8 % (ref 19.6–45.3)
LYMPHOCYTES NFR BLD AUTO: 27.8 % (ref 19.6–45.3)
LYMPHOCYTES NFR BLD AUTO: 27.9 % (ref 19.6–45.3)
LYMPHOCYTES NFR BLD AUTO: 3.8 % (ref 19.6–45.3)
LYMPHOCYTES NFR BLD AUTO: 31.2 % (ref 19.6–45.3)
LYMPHOCYTES NFR BLD AUTO: 37.7 % (ref 19.6–45.3)
LYMPHOCYTES NFR BLD AUTO: 40.2 % (ref 19.6–45.3)
LYMPHOCYTES NFR BLD AUTO: 5 % (ref 19.6–45.3)
LYMPHOCYTES NFR BLD AUTO: 66.2 % (ref 19.6–45.3)
LYMPHOCYTES NFR FLD MANUAL: 53 %
MACROPHAGE FLUID: 33 %
MAXIMAL PREDICTED HEART RATE: 133 BPM
MCH RBC QN AUTO: 29.6 PG (ref 26.6–33)
MCH RBC QN AUTO: 30.2 PG (ref 26.6–33)
MCH RBC QN AUTO: 30.2 PG (ref 26.6–33)
MCH RBC QN AUTO: 30.3 PG (ref 26.6–33)
MCH RBC QN AUTO: 30.5 PG (ref 26.6–33)
MCH RBC QN AUTO: 30.8 PG (ref 26.6–33)
MCH RBC QN AUTO: 30.8 PG (ref 26.6–33)
MCH RBC QN AUTO: 30.9 PG (ref 26.6–33)
MCH RBC QN AUTO: 31 PG (ref 26.6–33)
MCH RBC QN AUTO: 31.1 PG (ref 26.6–33)
MCH RBC QN AUTO: 31.6 PG (ref 26.6–33)
MCH RBC QN AUTO: 31.9 PG (ref 26.6–33)
MCH RBC QN AUTO: 31.9 PG (ref 26.6–33)
MCH RBC QN AUTO: 32.3 PG (ref 26.6–33)
MCHC RBC AUTO-ENTMCNC: 32.1 G/DL (ref 31.5–35.7)
MCHC RBC AUTO-ENTMCNC: 32.7 G/DL (ref 31.5–35.7)
MCHC RBC AUTO-ENTMCNC: 32.7 G/DL (ref 31.5–35.7)
MCHC RBC AUTO-ENTMCNC: 32.9 G/DL (ref 31.5–35.7)
MCHC RBC AUTO-ENTMCNC: 33.3 G/DL (ref 31.5–35.7)
MCHC RBC AUTO-ENTMCNC: 33.4 G/DL (ref 31.5–35.7)
MCHC RBC AUTO-ENTMCNC: 33.5 G/DL (ref 31.5–35.7)
MCHC RBC AUTO-ENTMCNC: 33.6 G/DL (ref 31.5–35.7)
MCHC RBC AUTO-ENTMCNC: 33.8 G/DL (ref 31.5–35.7)
MCHC RBC AUTO-ENTMCNC: 33.8 G/DL (ref 31.5–35.7)
MCHC RBC AUTO-ENTMCNC: 33.9 G/DL (ref 31.5–35.7)
MCHC RBC AUTO-ENTMCNC: 34 G/DL (ref 31.5–35.7)
MCHC RBC AUTO-ENTMCNC: 34.2 G/DL (ref 31.5–35.7)
MCHC RBC AUTO-ENTMCNC: 34.2 G/DL (ref 31.5–35.7)
MCHC RBC AUTO-ENTMCNC: 34.8 G/DL (ref 31.5–35.7)
MCHC RBC AUTO-ENTMCNC: 34.9 G/DL (ref 31.5–35.7)
MCHC RBC AUTO-ENTMCNC: 35.1 G/DL (ref 31.5–35.7)
MCV RBC AUTO: 87.5 FL (ref 79–97)
MCV RBC AUTO: 88.2 FL (ref 79–97)
MCV RBC AUTO: 88.2 FL (ref 79–97)
MCV RBC AUTO: 89.8 FL (ref 79–97)
MCV RBC AUTO: 90.1 FL (ref 79–97)
MCV RBC AUTO: 90.1 FL (ref 79–97)
MCV RBC AUTO: 90.5 FL (ref 79–97)
MCV RBC AUTO: 90.7 FL (ref 79–97)
MCV RBC AUTO: 91.8 FL (ref 79–97)
MCV RBC AUTO: 92.2 FL (ref 79–97)
MCV RBC AUTO: 92.5 FL (ref 79–97)
MCV RBC AUTO: 92.7 FL (ref 79–97)
MCV RBC AUTO: 94.5 FL (ref 79–97)
MCV RBC AUTO: 94.9 FL (ref 79–97)
MCV RBC AUTO: 95.6 FL (ref 79–97)
MCV RBC AUTO: 95.6 FL (ref 79–97)
MCV RBC AUTO: 95.9 FL (ref 79–97)
MESOTHL CELL NFR FLD MANUAL: 11 %
METHGB BLD QL: 0.9 % (ref 0–1.5)
METHGB BLD QL: 0.9 % (ref 0–1.5)
MODALITY: ABNORMAL
MODALITY: ABNORMAL
MONOCYTES # BLD AUTO: 0.3 10*3/MM3 (ref 0.1–0.9)
MONOCYTES # BLD AUTO: 0.5 10*3/MM3 (ref 0.1–0.9)
MONOCYTES # BLD AUTO: 0.6 10*3/MM3 (ref 0.1–0.9)
MONOCYTES # BLD AUTO: 0.7 10*3/MM3 (ref 0.1–0.9)
MONOCYTES # BLD AUTO: 0.8 10*3/MM3 (ref 0.1–0.9)
MONOCYTES # BLD AUTO: 0.8 10*3/MM3 (ref 0.1–0.9)
MONOCYTES # BLD AUTO: 0.82 10*3/MM3 (ref 0.1–0.9)
MONOCYTES # BLD AUTO: 0.82 10*3/MM3 (ref 0.1–0.9)
MONOCYTES # BLD AUTO: 0.83 10*3/MM3 (ref 0.1–0.9)
MONOCYTES # BLD AUTO: 1.16 10*3/MM3 (ref 0.1–0.9)
MONOCYTES # BLD AUTO: 2.41 10*3/MM3 (ref 0.1–0.9)
MONOCYTES # BLD AUTO: 2.93 10*3/MM3 (ref 0.1–0.9)
MONOCYTES NFR BLD AUTO: 10.2 % (ref 5–12)
MONOCYTES NFR BLD AUTO: 11.3 % (ref 5–12)
MONOCYTES NFR BLD AUTO: 11.5 % (ref 5–12)
MONOCYTES NFR BLD AUTO: 11.6 % (ref 5–12)
MONOCYTES NFR BLD AUTO: 12.3 % (ref 5–12)
MONOCYTES NFR BLD AUTO: 12.8 % (ref 5–12)
MONOCYTES NFR BLD AUTO: 13 % (ref 5–12)
MONOCYTES NFR BLD AUTO: 14.2 % (ref 5–12)
MONOCYTES NFR BLD AUTO: 14.2 % (ref 5–12)
MONOCYTES NFR BLD AUTO: 16.1 % (ref 5–12)
MONOCYTES NFR BLD AUTO: 18.4 % (ref 5–12)
MONOCYTES NFR BLD AUTO: 4.2 % (ref 5–12)
MONOCYTES NFR BLD AUTO: 8.3 % (ref 5–12)
MONOCYTES NFR BLD AUTO: 8.5 % (ref 5–12)
MONOCYTES NFR BLD AUTO: 9.2 % (ref 5–12)
MONOCYTES NFR BLD AUTO: 9.8 % (ref 5–12)
MONOCYTES NFR BLD AUTO: 9.9 % (ref 5–12)
MRSA DNA SPEC QL NAA+PROBE: NEGATIVE
NEUTROPHILS # BLD AUTO: 0.4 10*3/MM3 (ref 1.7–7)
NEUTROPHILS # BLD AUTO: 1.9 10*3/MM3 (ref 1.7–7)
NEUTROPHILS # BLD AUTO: 1.9 10*3/MM3 (ref 1.7–7)
NEUTROPHILS # BLD AUTO: 15.04 10*3/MM3 (ref 1.4–7)
NEUTROPHILS # BLD AUTO: 16.62 10*3/MM3 (ref 1.4–7)
NEUTROPHILS # BLD AUTO: 17.78 10*3/MM3 (ref 1.4–7)
NEUTROPHILS # BLD AUTO: 3.3 10*3/MM3 (ref 1.7–7)
NEUTROPHILS # BLD AUTO: 3.4 10*3/MM3 (ref 1.7–7)
NEUTROPHILS # BLD AUTO: 3.8 10*3/MM3 (ref 1.7–7)
NEUTROPHILS # BLD AUTO: 3.8 10*3/MM3 (ref 1.7–7)
NEUTROPHILS # BLD AUTO: 4.19 10*3/MM3 (ref 1.7–7)
NEUTROPHILS # BLD AUTO: 4.29 10*3/MM3 (ref 1.7–7)
NEUTROPHILS # BLD AUTO: 4.4 10*3/MM3 (ref 1.7–7)
NEUTROPHILS # BLD AUTO: 4.6 10*3/MM3 (ref 1.7–7)
NEUTROPHILS # BLD AUTO: 4.65 10*3/MM3 (ref 1.7–7)
NEUTROPHILS # BLD AUTO: 5.3 10*3/MM3 (ref 1.7–7)
NEUTROPHILS # BLD AUTO: 5.4 10*3/MM3 (ref 1.7–7)
NEUTROPHILS NFR BLD AUTO: 19.6 % (ref 42.7–76)
NEUTROPHILS NFR BLD AUTO: 43.9 % (ref 42.7–76)
NEUTROPHILS NFR BLD AUTO: 46.8 % (ref 42.7–76)
NEUTROPHILS NFR BLD AUTO: 57.2 % (ref 42.7–76)
NEUTROPHILS NFR BLD AUTO: 58.4 % (ref 42.7–76)
NEUTROPHILS NFR BLD AUTO: 62.2 % (ref 42.7–76)
NEUTROPHILS NFR BLD AUTO: 62.3 % (ref 42.7–76)
NEUTROPHILS NFR BLD AUTO: 62.9 % (ref 42.7–76)
NEUTROPHILS NFR BLD AUTO: 64.5 % (ref 42.7–76)
NEUTROPHILS NFR BLD AUTO: 64.9 % (ref 42.7–76)
NEUTROPHILS NFR BLD AUTO: 65.1 % (ref 42.7–76)
NEUTROPHILS NFR BLD AUTO: 65.7 % (ref 42.7–76)
NEUTROPHILS NFR BLD AUTO: 68.9 % (ref 42.7–76)
NEUTROPHILS NFR BLD AUTO: 72.7 % (ref 42.7–76)
NEUTROPHILS NFR BLD AUTO: 76.5 % (ref 42.7–76)
NEUTROPHILS NFR BLD AUTO: 80.8 % (ref 42.7–76)
NEUTROPHILS NFR BLD AUTO: 91.9 % (ref 42.7–76)
NEUTROPHILS NFR FLD MANUAL: 3 %
NITRITE UR QL STRIP: NEGATIVE
NOTE: ABNORMAL
NOTE: ABNORMAL
NRBC BLD AUTO-RTO: 0 /100 WBC (ref 0–0.2)
NT-PROBNP SERPL-MCNC: 182.5 PG/ML (ref 5–1800)
NT-PROBNP SERPL-MCNC: 530.2 PG/ML (ref 5–1800)
NT-PROBNP SERPL-MCNC: 534.9 PG/ML (ref 5–1800)
NT-PROBNP SERPL-MCNC: 624.3 PG/ML (ref 5–1800)
OXYHGB MFR BLDV: 89.5 % (ref 94–99)
OXYHGB MFR BLDV: 89.6 % (ref 94–99)
PATH REPORT.ADDENDUM SPEC: NORMAL
PATH REPORT.FINAL DX SPEC: NORMAL
PCO2 BLDA: 35.2 MM HG
PCO2 BLDA: 35.9 MM HG
PCO2 TEMP ADJ BLD: 35.9 MM HG (ref 35–48)
PCO2 TEMP ADJ BLD: ABNORMAL MM[HG]
PH BLDA: 7.42 PH UNITS (ref 7.35–7.45)
PH BLDA: 7.46 PH UNITS (ref 7.35–7.45)
PH FLD: 7.37 [PH]
PH UR STRIP.AUTO: >=9 [PH] (ref 5–8)
PH, TEMP CORRECTED: 7.42 PH UNITS
PH, TEMP CORRECTED: ABNORMAL
PLATELET # BLD AUTO: 148 10*3/MM3 (ref 140–450)
PLATELET # BLD AUTO: 162 10*3/MM3 (ref 140–450)
PLATELET # BLD AUTO: 164 10*3/MM3 (ref 140–450)
PLATELET # BLD AUTO: 166 10*3/MM3 (ref 140–450)
PLATELET # BLD AUTO: 170 10*3/MM3 (ref 140–450)
PLATELET # BLD AUTO: 185 10*3/MM3 (ref 140–450)
PLATELET # BLD AUTO: 205 10*3/MM3 (ref 140–450)
PLATELET # BLD AUTO: 214 10*3/MM3 (ref 140–450)
PLATELET # BLD AUTO: 223 10*3/MM3 (ref 140–450)
PLATELET # BLD AUTO: 232 10*3/MM3 (ref 140–450)
PLATELET # BLD AUTO: 246 10*3/MM3 (ref 140–450)
PLATELET # BLD AUTO: 250 10*3/MM3 (ref 140–450)
PLATELET # BLD AUTO: 263 10*3/MM3 (ref 140–450)
PLATELET # BLD AUTO: 276 10*3/MM3 (ref 140–450)
PLATELET # BLD AUTO: 301 10*3/MM3 (ref 140–450)
PLATELET # BLD AUTO: 344 10*3/MM3 (ref 140–450)
PLATELET # BLD AUTO: 41 10*3/MM3 (ref 140–450)
PLATELET # BLD AUTO: 490 10*3/MM3 (ref 140–450)
PLATELET # BLD AUTO: 87 10*3/MM3 (ref 140–450)
PMV BLD AUTO: 5.5 FL (ref 6–12)
PMV BLD AUTO: 5.5 FL (ref 6–12)
PMV BLD AUTO: 5.8 FL (ref 6–12)
PMV BLD AUTO: 5.9 FL (ref 6–12)
PMV BLD AUTO: 6.1 FL (ref 6–12)
PMV BLD AUTO: 6.1 FL (ref 6–12)
PMV BLD AUTO: 6.3 FL (ref 6–12)
PMV BLD AUTO: 6.7 FL (ref 6–12)
PMV BLD AUTO: 7.8 FL (ref 6–12)
PMV BLD AUTO: 8.4 FL (ref 6–12)
PMV BLD AUTO: 8.5 FL (ref 6–12)
PMV BLD AUTO: 8.7 FL (ref 6–12)
PMV BLD AUTO: 8.8 FL (ref 6–12)
PMV BLD AUTO: 8.9 FL (ref 6–12)
PMV BLD AUTO: 9 FL (ref 6–12)
PMV BLD AUTO: 9 FL (ref 6–12)
PMV BLD AUTO: 9.5 FL (ref 6–12)
PO2 BLDA: 55.3 MM HG (ref 83–108)
PO2 BLDA: 58.1 MM HG (ref 83–108)
PO2 TEMP ADJ BLD: 58.1 MM HG (ref 83–108)
PO2 TEMP ADJ BLD: ABNORMAL MM[HG]
POC CREATININE URINE: 50
POC MICROALBUMIN URINE: 10
POTASSIUM BLD-SCNC: 3.7 MMOL/L (ref 3.5–5.2)
POTASSIUM BLD-SCNC: 3.8 MMOL/L (ref 3.5–5.2)
POTASSIUM BLD-SCNC: 3.9 MMOL/L (ref 3.5–5.2)
POTASSIUM BLD-SCNC: 4 MMOL/L (ref 3.5–5.2)
POTASSIUM BLD-SCNC: 4.1 MMOL/L (ref 3.5–5.2)
POTASSIUM BLD-SCNC: 4.2 MMOL/L (ref 3.5–5.2)
POTASSIUM BLD-SCNC: 4.3 MMOL/L (ref 3.5–5.2)
POTASSIUM BLD-SCNC: 4.7 MMOL/L (ref 3.5–5.2)
POTASSIUM BLD-SCNC: 4.7 MMOL/L (ref 3.5–5.2)
POTASSIUM BLD-SCNC: 4.9 MMOL/L (ref 3.5–5.1)
POTASSIUM BLD-SCNC: 5 MMOL/L (ref 3.5–5.2)
POTASSIUM BLD-SCNC: 5.1 MMOL/L (ref 3.5–5.2)
POTASSIUM BLD-SCNC: 5.5 MMOL/L (ref 3.5–5.2)
POTASSIUM BLD-SCNC: 5.6 MMOL/L (ref 3.5–5.2)
PROCALCITONIN SERPL-MCNC: 0.02 NG/ML (ref 0.1–0.25)
PROCALCITONIN SERPL-MCNC: 0.1 NG/ML (ref 0.1–0.25)
PROT FLD-MCNC: 4.3 G/DL
PROT SERPL-MCNC: 5.7 G/DL (ref 6–8.5)
PROT SERPL-MCNC: 5.8 G/DL (ref 6–8.5)
PROT SERPL-MCNC: 6.4 G/DL (ref 6–8.5)
PROT SERPL-MCNC: 6.5 G/DL (ref 6–8.5)
PROT SERPL-MCNC: 6.6 G/DL (ref 6–8.5)
PROT SERPL-MCNC: 6.6 G/DL (ref 6–8.5)
PROT SERPL-MCNC: 6.7 G/DL (ref 6–8.5)
PROT SERPL-MCNC: 6.9 G/DL (ref 6.3–8.2)
PROT SERPL-MCNC: 6.9 G/DL (ref 6–8.5)
PROT SERPL-MCNC: 6.9 G/DL (ref 6–8.5)
PROT SERPL-MCNC: 7 G/DL (ref 6–8.5)
PROT SERPL-MCNC: 7.1 G/DL (ref 6–8.5)
PROT SERPL-MCNC: 7.2 G/DL (ref 6–8.5)
PROT SERPL-MCNC: 7.7 G/DL (ref 6–8.5)
PROT UR QL STRIP: NEGATIVE
PROTHROMBIN TIME: 13.2 SECONDS (ref 11.2–14.3)
PROTHROMBIN TIME: 13.7 SECONDS (ref 11.2–14.3)
RBC # BLD AUTO: 3.42 10*6/MM3 (ref 4.14–5.8)
RBC # BLD AUTO: 3.45 10*6/MM3 (ref 4.14–5.8)
RBC # BLD AUTO: 3.58 10*6/MM3 (ref 4.14–5.8)
RBC # BLD AUTO: 3.61 10*6/MM3 (ref 4.14–5.8)
RBC # BLD AUTO: 3.76 10*6/MM3 (ref 4.14–5.8)
RBC # BLD AUTO: 3.77 10*6/MM3 (ref 4.14–5.8)
RBC # BLD AUTO: 3.86 10*6/MM3 (ref 4.14–5.8)
RBC # BLD AUTO: 3.93 10*6/MM3 (ref 4.14–5.8)
RBC # BLD AUTO: 4 10*6/MM3 (ref 4.14–5.8)
RBC # BLD AUTO: 4.08 10*6/MM3 (ref 4.14–5.8)
RBC # BLD AUTO: 4.17 10*6/MM3 (ref 4.14–5.8)
RBC # BLD AUTO: 4.22 10*6/MM3 (ref 4.14–5.8)
RBC # BLD AUTO: 4.32 10*6/MM3 (ref 4.14–5.8)
RBC # BLD AUTO: 4.33 10*6/MM3 (ref 4.14–5.8)
RBC # BLD AUTO: 4.43 10*6/MM3 (ref 4.14–5.8)
RBC # BLD AUTO: 4.63 10*6/MM3 (ref 4.14–5.8)
RBC # BLD AUTO: 4.75 10*6/MM3 (ref 4.14–5.8)
RBC # BLD AUTO: 5.15 10*6/MM3 (ref 4.14–5.8)
RBC # BLD AUTO: 5.25 10*6/MM3 (ref 4.14–5.8)
RBC # FLD AUTO: ABNORMAL /MM3
RBC # UR: NORMAL /HPF
REF LAB TEST METHOD: NORMAL
SODIUM BLD-SCNC: 127 MMOL/L (ref 136–145)
SODIUM BLD-SCNC: 128 MMOL/L (ref 136–145)
SODIUM BLD-SCNC: 129 MMOL/L (ref 136–145)
SODIUM BLD-SCNC: 130 MMOL/L (ref 136–145)
SODIUM BLD-SCNC: 131 MMOL/L (ref 136–145)
SODIUM BLD-SCNC: 131 MMOL/L (ref 136–145)
SODIUM BLD-SCNC: 134 MMOL/L (ref 136–145)
SODIUM BLD-SCNC: 134 MMOL/L (ref 136–145)
SODIUM BLD-SCNC: 138 MMOL/L (ref 136–145)
SODIUM BLD-SCNC: 138 MMOL/L (ref 136–145)
SODIUM BLD-SCNC: 139 MMOL/L (ref 136–145)
SODIUM BLD-SCNC: 140 MMOL/L (ref 136–145)
SODIUM BLD-SCNC: 140 MMOL/L (ref 136–145)
SODIUM BLD-SCNC: 141 MMOL/L (ref 136–145)
SODIUM BLD-SCNC: 141 MMOL/L (ref 137–145)
SP GR UR STRIP: 1.01 (ref 1–1.03)
SQUAMOUS #/AREA URNS HPF: NORMAL /HPF
STRESS TARGET HR: 113 BPM
T3FREE SERPL-MCNC: 2.31 PG/ML (ref 2–4.4)
T3FREE SERPL-MCNC: 2.73 PG/ML (ref 2–4.4)
T3FREE SERPL-MCNC: 2.8 PG/ML (ref 2–4.4)
T3FREE SERPL-MCNC: 2.8 PG/ML (ref 2–4.4)
T3FREE SERPL-MCNC: 2.85 PG/ML (ref 2–4.4)
T4 FREE SERPL-MCNC: 0.89 NG/DL (ref 0.93–1.7)
T4 FREE SERPL-MCNC: 0.99 NG/DL (ref 0.93–1.7)
T4 FREE SERPL-MCNC: 0.99 NG/DL (ref 0.93–1.7)
T4 FREE SERPL-MCNC: 1 NG/DL (ref 0.93–1.7)
T4 FREE SERPL-MCNC: 1.05 NG/DL (ref 0.93–1.7)
TRIGL SERPL-MCNC: 111 MG/DL
TROPONIN I SERPL-MCNC: 0 NG/ML (ref 0–0.07)
TROPONIN T SERPL-MCNC: <0.01 NG/ML (ref 0–0.03)
TROPONIN T SERPL-MCNC: <0.01 NG/ML (ref 0–0.03)
TSH SERPL DL<=0.05 MIU/L-ACNC: 1.11 MIU/ML (ref 0.27–4.2)
TSH SERPL DL<=0.05 MIU/L-ACNC: 1.57 MIU/ML (ref 0.27–4.2)
TSH SERPL DL<=0.05 MIU/L-ACNC: 1.97 MIU/ML (ref 0.27–4.2)
TSH SERPL DL<=0.05 MIU/L-ACNC: 2.09 MIU/ML (ref 0.27–4.2)
TSH SERPL DL<=0.05 MIU/L-ACNC: 2.38 UIU/ML (ref 0.27–4.2)
TSH SERPL DL<=0.05 MIU/L-ACNC: 2.48 MIU/ML (ref 0.47–4.68)
TSH SERPL DL<=0.05 MIU/L-ACNC: 3.33 UIU/ML (ref 0.27–4.2)
UROBILINOGEN UR QL STRIP: ABNORMAL
VENTILATOR MODE: ABNORMAL
VLDLC SERPL-MCNC: 22.2 MG/DL
WBC # FLD AUTO: 1004 /MM3
WBC NRBC COR # BLD: 13.13 10*3/MM3 (ref 3.4–10.8)
WBC NRBC COR # BLD: 19.35 10*3/MM3 (ref 3.4–10.8)
WBC NRBC COR # BLD: 19.66 10*3/MM3 (ref 3.4–10.8)
WBC NRBC COR # BLD: 2 10*3/MM3 (ref 3.4–10.8)
WBC NRBC COR # BLD: 20.51 10*3/MM3 (ref 3.4–10.8)
WBC NRBC COR # BLD: 20.59 10*3/MM3 (ref 3.4–10.8)
WBC NRBC COR # BLD: 4.1 10*3/MM3 (ref 3.4–10.8)
WBC NRBC COR # BLD: 4.4 10*3/MM3 (ref 3.4–10.8)
WBC NRBC COR # BLD: 5.3 10*3/MM3 (ref 3.4–10.8)
WBC NRBC COR # BLD: 5.8 10*3/MM3 (ref 3.4–10.8)
WBC NRBC COR # BLD: 6 10*3/MM3 (ref 3.4–10.8)
WBC NRBC COR # BLD: 6.1 10*3/MM3 (ref 3.4–10.8)
WBC NRBC COR # BLD: 6.43 10*3/MM3 (ref 3.4–10.8)
WBC NRBC COR # BLD: 6.7 10*3/MM3 (ref 3.4–10.8)
WBC NRBC COR # BLD: 7.21 10*3/MM3 (ref 3.4–10.8)
WBC NRBC COR # BLD: 7.3 10*3/MM3 (ref 3.4–10.8)
WBC NRBC COR # BLD: 7.3 10*3/MM3 (ref 3.4–10.8)
WBC NRBC COR # BLD: 7.34 10*3/MM3 (ref 3.4–10.8)
WBC NRBC COR # BLD: 7.8 10*3/MM3 (ref 3.4–10.8)
WBC UR QL AUTO: NORMAL /HPF
WHOLE BLOOD HOLD SPECIMEN: NORMAL

## 2019-01-01 PROCEDURE — G0463 HOSPITAL OUTPT CLINIC VISIT: HCPCS | Performed by: RADIOLOGY

## 2019-01-01 PROCEDURE — 80053 COMPREHEN METABOLIC PANEL: CPT | Performed by: INTERNAL MEDICINE

## 2019-01-01 PROCEDURE — 77412 RADIATION TX DELIVERY LVL 3: CPT | Performed by: RADIOLOGY

## 2019-01-01 PROCEDURE — 25010000002 ETOPOSIDE 500 MG/25ML SOLUTION 25 ML VIAL: Performed by: INTERNAL MEDICINE

## 2019-01-01 PROCEDURE — 99214 OFFICE O/P EST MOD 30 MIN: CPT | Performed by: INTERNAL MEDICINE

## 2019-01-01 PROCEDURE — 96413 CHEMO IV INFUSION 1 HR: CPT

## 2019-01-01 PROCEDURE — 85027 COMPLETE CBC AUTOMATED: CPT | Performed by: INTERNAL MEDICINE

## 2019-01-01 PROCEDURE — 71045 X-RAY EXAM CHEST 1 VIEW: CPT

## 2019-01-01 PROCEDURE — 96375 TX/PRO/DX INJ NEW DRUG ADDON: CPT

## 2019-01-01 PROCEDURE — 51798 US URINE CAPACITY MEASURE: CPT

## 2019-01-01 PROCEDURE — 85610 PROTHROMBIN TIME: CPT | Performed by: PHYSICIAN ASSISTANT

## 2019-01-01 PROCEDURE — 25010000002 FUROSEMIDE PER 20 MG: Performed by: PHYSICIAN ASSISTANT

## 2019-01-01 PROCEDURE — 25010000002 HALOPERIDOL LACTATE PER 5 MG: Performed by: NURSE PRACTITIONER

## 2019-01-01 PROCEDURE — 36415 COLL VENOUS BLD VENIPUNCTURE: CPT

## 2019-01-01 PROCEDURE — 94660 CPAP INITIATION&MGMT: CPT

## 2019-01-01 PROCEDURE — 83036 HEMOGLOBIN GLYCOSYLATED A1C: CPT | Performed by: FAMILY MEDICINE

## 2019-01-01 PROCEDURE — 84481 FREE ASSAY (FT-3): CPT | Performed by: INTERNAL MEDICINE

## 2019-01-01 PROCEDURE — 94799 UNLISTED PULMONARY SVC/PX: CPT

## 2019-01-01 PROCEDURE — 99221 1ST HOSP IP/OBS SF/LOW 40: CPT | Performed by: THORACIC SURGERY (CARDIOTHORACIC VASCULAR SURGERY)

## 2019-01-01 PROCEDURE — 25010000002 IOPAMIDOL 61 % SOLUTION: Performed by: INTERNAL MEDICINE

## 2019-01-01 PROCEDURE — 84443 ASSAY THYROID STIM HORMONE: CPT

## 2019-01-01 PROCEDURE — 96417 CHEMO IV INFUS EACH ADDL SEQ: CPT

## 2019-01-01 PROCEDURE — G0463 HOSPITAL OUTPT CLINIC VISIT: HCPCS

## 2019-01-01 PROCEDURE — 87804 INFLUENZA ASSAY W/OPTIC: CPT | Performed by: PHYSICIAN ASSISTANT

## 2019-01-01 PROCEDURE — 99231 SBSQ HOSP IP/OBS SF/LOW 25: CPT | Performed by: PHYSICIAN ASSISTANT

## 2019-01-01 PROCEDURE — 82042 OTHER SOURCE ALBUMIN QUAN EA: CPT | Performed by: INTERNAL MEDICINE

## 2019-01-01 PROCEDURE — 25010000002 HYDROMORPHONE PER 4 MG: Performed by: INTERNAL MEDICINE

## 2019-01-01 PROCEDURE — 25010000002 CARBOPLATIN PER 50 MG: Performed by: INTERNAL MEDICINE

## 2019-01-01 PROCEDURE — 80053 COMPREHEN METABOLIC PANEL: CPT | Performed by: EMERGENCY MEDICINE

## 2019-01-01 PROCEDURE — 25010000002 ONDANSETRON PER 1 MG: Performed by: HOSPITALIST

## 2019-01-01 PROCEDURE — 84132 ASSAY OF SERUM POTASSIUM: CPT | Performed by: INTERNAL MEDICINE

## 2019-01-01 PROCEDURE — 25010000002 DEXAMETHASONE PER 1 MG: Performed by: INTERNAL MEDICINE

## 2019-01-01 PROCEDURE — 71275 CT ANGIOGRAPHY CHEST: CPT

## 2019-01-01 PROCEDURE — 25010000002 ONDANSETRON PER 1 MG: Performed by: INTERNAL MEDICINE

## 2019-01-01 PROCEDURE — 85025 COMPLETE CBC W/AUTO DIFF WBC: CPT | Performed by: INTERNAL MEDICINE

## 2019-01-01 PROCEDURE — 36600 WITHDRAWAL OF ARTERIAL BLOOD: CPT

## 2019-01-01 PROCEDURE — 80053 COMPREHEN METABOLIC PANEL: CPT

## 2019-01-01 PROCEDURE — 82945 GLUCOSE OTHER FLUID: CPT | Performed by: INTERNAL MEDICINE

## 2019-01-01 PROCEDURE — 93306 TTE W/DOPPLER COMPLETE: CPT

## 2019-01-01 PROCEDURE — 0 GADOBENATE DIMEGLUMINE 529 MG/ML SOLUTION: Performed by: INTERNAL MEDICINE

## 2019-01-01 PROCEDURE — 99215 OFFICE O/P EST HI 40 MIN: CPT | Performed by: INTERNAL MEDICINE

## 2019-01-01 PROCEDURE — 36600 WITHDRAWAL OF ARTERIAL BLOOD: CPT | Performed by: HOSPITALIST

## 2019-01-01 PROCEDURE — 96367 TX/PROPH/DG ADDL SEQ IV INF: CPT

## 2019-01-01 PROCEDURE — 99233 SBSQ HOSP IP/OBS HIGH 50: CPT | Performed by: HOSPITALIST

## 2019-01-01 PROCEDURE — 36592 COLLECT BLOOD FROM PICC: CPT

## 2019-01-01 PROCEDURE — 99233 SBSQ HOSP IP/OBS HIGH 50: CPT | Performed by: INTERNAL MEDICINE

## 2019-01-01 PROCEDURE — 84439 ASSAY OF FREE THYROXINE: CPT | Performed by: INTERNAL MEDICINE

## 2019-01-01 PROCEDURE — 84484 ASSAY OF TROPONIN QUANT: CPT | Performed by: EMERGENCY MEDICINE

## 2019-01-01 PROCEDURE — 83880 ASSAY OF NATRIURETIC PEPTIDE: CPT | Performed by: PHYSICIAN ASSISTANT

## 2019-01-01 PROCEDURE — 85025 COMPLETE CBC W/AUTO DIFF WBC: CPT | Performed by: PHYSICIAN ASSISTANT

## 2019-01-01 PROCEDURE — 99221 1ST HOSP IP/OBS SF/LOW 40: CPT | Performed by: INTERNAL MEDICINE

## 2019-01-01 PROCEDURE — 81001 URINALYSIS AUTO W/SCOPE: CPT | Performed by: PHYSICIAN ASSISTANT

## 2019-01-01 PROCEDURE — 25010000002 HYDROMORPHONE PER 4 MG: Performed by: FAMILY MEDICINE

## 2019-01-01 PROCEDURE — 84145 PROCALCITONIN (PCT): CPT | Performed by: INTERNAL MEDICINE

## 2019-01-01 PROCEDURE — 71250 CT THORAX DX C-: CPT

## 2019-01-01 PROCEDURE — 63710000001 PROCHLORPERAZINE MALEATE PER 10 MG: Performed by: INTERNAL MEDICINE

## 2019-01-01 PROCEDURE — 71260 CT THORAX DX C+: CPT

## 2019-01-01 PROCEDURE — C1751 CATH, INF, PER/CENT/MIDLINE: HCPCS

## 2019-01-01 PROCEDURE — 83615 LACTATE (LD) (LDH) ENZYME: CPT | Performed by: INTERNAL MEDICINE

## 2019-01-01 PROCEDURE — 83036 HEMOGLOBIN GLYCOSYLATED A1C: CPT

## 2019-01-01 PROCEDURE — A9270 NON-COVERED ITEM OR SERVICE: HCPCS | Performed by: INTERNAL MEDICINE

## 2019-01-01 PROCEDURE — 88112 CYTOPATH CELL ENHANCE TECH: CPT | Performed by: INTERNAL MEDICINE

## 2019-01-01 PROCEDURE — 25010000002 ATEZOLIZUMAB 1200 MG/20ML SOLUTION 20 ML VIAL: Performed by: INTERNAL MEDICINE

## 2019-01-01 PROCEDURE — A9577 INJ MULTIHANCE: HCPCS | Performed by: RADIOLOGY

## 2019-01-01 PROCEDURE — 88305 TISSUE EXAM BY PATHOLOGIST: CPT | Performed by: INTERNAL MEDICINE

## 2019-01-01 PROCEDURE — 25010000002 HYDROMORPHONE PER 4 MG: Performed by: HOSPITALIST

## 2019-01-01 PROCEDURE — 99284 EMERGENCY DEPT VISIT MOD MDM: CPT

## 2019-01-01 PROCEDURE — 88341 IMHCHEM/IMCYTCHM EA ADD ANTB: CPT | Performed by: INTERNAL MEDICINE

## 2019-01-01 PROCEDURE — 87015 SPECIMEN INFECT AGNT CONCNTJ: CPT | Performed by: INTERNAL MEDICINE

## 2019-01-01 PROCEDURE — 25010000002 LORAZEPAM PER 2 MG: Performed by: NURSE PRACTITIONER

## 2019-01-01 PROCEDURE — 25010000002 FUROSEMIDE PER 20 MG: Performed by: INTERNAL MEDICINE

## 2019-01-01 PROCEDURE — 25010000002 PALONOSETRON PER 25 MCG: Performed by: INTERNAL MEDICINE

## 2019-01-01 PROCEDURE — 99232 SBSQ HOSP IP/OBS MODERATE 35: CPT | Performed by: INTERNAL MEDICINE

## 2019-01-01 PROCEDURE — 85730 THROMBOPLASTIN TIME PARTIAL: CPT | Performed by: RADIOLOGY

## 2019-01-01 PROCEDURE — 25010000002 LORAZEPAM PER 2 MG: Performed by: FAMILY MEDICINE

## 2019-01-01 PROCEDURE — 93971 EXTREMITY STUDY: CPT

## 2019-01-01 PROCEDURE — 80048 BASIC METABOLIC PNL TOTAL CA: CPT | Performed by: HOSPITALIST

## 2019-01-01 PROCEDURE — 77280 THER RAD SIMULAJ FIELD SMPL: CPT | Performed by: RADIOLOGY

## 2019-01-01 PROCEDURE — 25010000002 CEFTRIAXONE PER 250 MG: Performed by: EMERGENCY MEDICINE

## 2019-01-01 PROCEDURE — 25010000002 HYDROMORPHONE HCL-NACL 30-0.9 MG/30ML-% SOLUTION PREFILLED SYRINGE: Performed by: INTERNAL MEDICINE

## 2019-01-01 PROCEDURE — 84145 PROCALCITONIN (PCT): CPT | Performed by: EMERGENCY MEDICINE

## 2019-01-01 PROCEDURE — 80048 BASIC METABOLIC PNL TOTAL CA: CPT | Performed by: INTERNAL MEDICINE

## 2019-01-01 PROCEDURE — 70553 MRI BRAIN STEM W/O & W/DYE: CPT

## 2019-01-01 PROCEDURE — 72072 X-RAY EXAM THORAC SPINE 3VWS: CPT

## 2019-01-01 PROCEDURE — 83880 ASSAY OF NATRIURETIC PEPTIDE: CPT | Performed by: NURSE PRACTITIONER

## 2019-01-01 PROCEDURE — 99285 EMERGENCY DEPT VISIT HI MDM: CPT

## 2019-01-01 PROCEDURE — G0439 PPPS, SUBSEQ VISIT: HCPCS | Performed by: FAMILY MEDICINE

## 2019-01-01 PROCEDURE — 0 GADOBENATE DIMEGLUMINE 529 MG/ML SOLUTION: Performed by: RADIOLOGY

## 2019-01-01 PROCEDURE — 84443 ASSAY THYROID STIM HORMONE: CPT | Performed by: INTERNAL MEDICINE

## 2019-01-01 PROCEDURE — 96374 THER/PROPH/DIAG INJ IV PUSH: CPT

## 2019-01-01 PROCEDURE — 77336 RADIATION PHYSICS CONSULT: CPT | Performed by: RADIOLOGY

## 2019-01-01 PROCEDURE — 85025 COMPLETE CBC W/AUTO DIFF WBC: CPT | Performed by: EMERGENCY MEDICINE

## 2019-01-01 PROCEDURE — 82805 BLOOD GASES W/O2 SATURATION: CPT

## 2019-01-01 PROCEDURE — 25010000002 FUROSEMIDE PER 20 MG: Performed by: NURSE PRACTITIONER

## 2019-01-01 PROCEDURE — 25010000002 FOSAPREPITANT PER 1 MG: Performed by: INTERNAL MEDICINE

## 2019-01-01 PROCEDURE — 0 IOPAMIDOL PER 1 ML: Performed by: EMERGENCY MEDICINE

## 2019-01-01 PROCEDURE — 87641 MR-STAPH DNA AMP PROBE: CPT

## 2019-01-01 PROCEDURE — 77417 THER RADIOLOGY PORT IMAGE(S): CPT | Performed by: RADIOLOGY

## 2019-01-01 PROCEDURE — 71046 X-RAY EXAM CHEST 2 VIEWS: CPT

## 2019-01-01 PROCEDURE — 80061 LIPID PANEL: CPT

## 2019-01-01 PROCEDURE — 99232 SBSQ HOSP IP/OBS MODERATE 35: CPT | Performed by: HOSPITALIST

## 2019-01-01 PROCEDURE — 99214 OFFICE O/P EST MOD 30 MIN: CPT | Performed by: NURSE PRACTITIONER

## 2019-01-01 PROCEDURE — 99214 OFFICE O/P EST MOD 30 MIN: CPT | Performed by: FAMILY MEDICINE

## 2019-01-01 PROCEDURE — 83605 ASSAY OF LACTIC ACID: CPT | Performed by: EMERGENCY MEDICINE

## 2019-01-01 PROCEDURE — 25010000002 DIPHENHYDRAMINE PER 50 MG: Performed by: INTERNAL MEDICINE

## 2019-01-01 PROCEDURE — 99231 SBSQ HOSP IP/OBS SF/LOW 25: CPT | Performed by: INTERNAL MEDICINE

## 2019-01-01 PROCEDURE — A9577 INJ MULTIHANCE: HCPCS | Performed by: INTERNAL MEDICINE

## 2019-01-01 PROCEDURE — 82565 ASSAY OF CREATININE: CPT

## 2019-01-01 PROCEDURE — 25010000002 CEFTRIAXONE PER 250 MG: Performed by: INTERNAL MEDICINE

## 2019-01-01 PROCEDURE — 80053 COMPREHEN METABOLIC PANEL: CPT | Performed by: PHYSICIAN ASSISTANT

## 2019-01-01 PROCEDURE — 25010000002 MORPHINE PER 10 MG: Performed by: EMERGENCY MEDICINE

## 2019-01-01 PROCEDURE — 25010000002 PIPERACILLIN SOD-TAZOBACTAM PER 1 G: Performed by: HOSPITALIST

## 2019-01-01 PROCEDURE — 93005 ELECTROCARDIOGRAM TRACING: CPT | Performed by: NURSE PRACTITIONER

## 2019-01-01 PROCEDURE — 25010000002 PACLITAXEL PER 30 MG: Performed by: INTERNAL MEDICINE

## 2019-01-01 PROCEDURE — 84481 FREE ASSAY (FT-3): CPT

## 2019-01-01 PROCEDURE — 25010000002 PIPERACILLIN SOD-TAZOBACTAM PER 1 G: Performed by: EMERGENCY MEDICINE

## 2019-01-01 PROCEDURE — 77012 CT SCAN FOR NEEDLE BIOPSY: CPT

## 2019-01-01 PROCEDURE — 83986 ASSAY PH BODY FLUID NOS: CPT | Performed by: INTERNAL MEDICINE

## 2019-01-01 PROCEDURE — 99291 CRITICAL CARE FIRST HOUR: CPT | Performed by: INTERNAL MEDICINE

## 2019-01-01 PROCEDURE — 63710000001 BARIUM 2 % SUSPENSION: Performed by: INTERNAL MEDICINE

## 2019-01-01 PROCEDURE — 77334 RADIATION TREATMENT AID(S): CPT | Performed by: RADIOLOGY

## 2019-01-01 PROCEDURE — C1894 INTRO/SHEATH, NON-LASER: HCPCS

## 2019-01-01 PROCEDURE — 77307 TELETHX ISODOSE PLAN CPLX: CPT | Performed by: RADIOLOGY

## 2019-01-01 PROCEDURE — 25010000002 HALOPERIDOL LACTATE PER 5 MG: Performed by: FAMILY MEDICINE

## 2019-01-01 PROCEDURE — 25010000002 VANCOMYCIN 10 G RECONSTITUTED SOLUTION

## 2019-01-01 PROCEDURE — 94640 AIRWAY INHALATION TREATMENT: CPT

## 2019-01-01 PROCEDURE — 25010000002 HYDROMORPHONE 1 MG/ML SOLUTION: Performed by: NURSE PRACTITIONER

## 2019-01-01 PROCEDURE — 74177 CT ABD & PELVIS W/CONTRAST: CPT

## 2019-01-01 PROCEDURE — 99232 SBSQ HOSP IP/OBS MODERATE 35: CPT | Performed by: NURSE PRACTITIONER

## 2019-01-01 PROCEDURE — 85610 PROTHROMBIN TIME: CPT | Performed by: RADIOLOGY

## 2019-01-01 PROCEDURE — 93971 EXTREMITY STUDY: CPT | Performed by: INTERNAL MEDICINE

## 2019-01-01 PROCEDURE — 84484 ASSAY OF TROPONIN QUANT: CPT

## 2019-01-01 PROCEDURE — 89051 BODY FLUID CELL COUNT: CPT | Performed by: INTERNAL MEDICINE

## 2019-01-01 PROCEDURE — 25010000002 MORPHINE PER 10 MG: Performed by: HOSPITALIST

## 2019-01-01 PROCEDURE — 74176 CT ABD & PELVIS W/O CONTRAST: CPT

## 2019-01-01 PROCEDURE — 82962 GLUCOSE BLOOD TEST: CPT

## 2019-01-01 PROCEDURE — 87040 BLOOD CULTURE FOR BACTERIA: CPT | Performed by: EMERGENCY MEDICINE

## 2019-01-01 PROCEDURE — 25010000002 HEPARIN (PORCINE) PER 1000 UNITS: Performed by: HOSPITALIST

## 2019-01-01 PROCEDURE — 0 DIATRIZOATE MEGLUMINE & SODIUM PER 1 ML

## 2019-01-01 PROCEDURE — C1729 CATH, DRAINAGE: HCPCS

## 2019-01-01 PROCEDURE — 77290 THER RAD SIMULAJ FIELD CPLX: CPT | Performed by: RADIOLOGY

## 2019-01-01 PROCEDURE — 87070 CULTURE OTHR SPECIMN AEROBIC: CPT | Performed by: INTERNAL MEDICINE

## 2019-01-01 PROCEDURE — 82805 BLOOD GASES W/O2 SATURATION: CPT | Performed by: HOSPITALIST

## 2019-01-01 PROCEDURE — 87205 SMEAR GRAM STAIN: CPT | Performed by: INTERNAL MEDICINE

## 2019-01-01 PROCEDURE — 93005 ELECTROCARDIOGRAM TRACING: CPT | Performed by: EMERGENCY MEDICINE

## 2019-01-01 PROCEDURE — 99239 HOSP IP/OBS DSCHRG MGMT >30: CPT | Performed by: NURSE PRACTITIONER

## 2019-01-01 PROCEDURE — 93306 TTE W/DOPPLER COMPLETE: CPT | Performed by: INTERNAL MEDICINE

## 2019-01-01 PROCEDURE — 25010000002 ETOPOSIDE 1 GM/50ML SOLUTION 50 ML VIAL: Performed by: INTERNAL MEDICINE

## 2019-01-01 PROCEDURE — 0W9930Z DRAINAGE OF RIGHT PLEURAL CAVITY WITH DRAINAGE DEVICE, PERCUTANEOUS APPROACH: ICD-10-PCS | Performed by: THORACIC SURGERY (CARDIOTHORACIC VASCULAR SURGERY)

## 2019-01-01 PROCEDURE — 84157 ASSAY OF PROTEIN OTHER: CPT | Performed by: INTERNAL MEDICINE

## 2019-01-01 PROCEDURE — 85025 COMPLETE CBC W/AUTO DIFF WBC: CPT

## 2019-01-01 PROCEDURE — 82044 UR ALBUMIN SEMIQUANTITATIVE: CPT | Performed by: FAMILY MEDICINE

## 2019-01-01 PROCEDURE — 84439 ASSAY OF FREE THYROXINE: CPT

## 2019-01-01 PROCEDURE — 25010000002 ALTEPLASE 2 MG RECONSTITUTED SOLUTION 1 EACH VIAL: Performed by: INTERNAL MEDICINE

## 2019-01-01 PROCEDURE — 99223 1ST HOSP IP/OBS HIGH 75: CPT | Performed by: HOSPITALIST

## 2019-01-01 PROCEDURE — 93000 ELECTROCARDIOGRAM COMPLETE: CPT | Performed by: FAMILY MEDICINE

## 2019-01-01 PROCEDURE — 25010000002 PROMETHAZINE PER 50 MG: Performed by: NURSE PRACTITIONER

## 2019-01-01 PROCEDURE — 25010000002 LEVOFLOXACIN PER 250 MG: Performed by: EMERGENCY MEDICINE

## 2019-01-01 PROCEDURE — 83880 ASSAY OF NATRIURETIC PEPTIDE: CPT | Performed by: EMERGENCY MEDICINE

## 2019-01-01 PROCEDURE — 84484 ASSAY OF TROPONIN QUANT: CPT | Performed by: PHYSICIAN ASSISTANT

## 2019-01-01 PROCEDURE — 99223 1ST HOSP IP/OBS HIGH 75: CPT | Performed by: INTERNAL MEDICINE

## 2019-01-01 PROCEDURE — 93005 ELECTROCARDIOGRAM TRACING: CPT | Performed by: PHYSICIAN ASSISTANT

## 2019-01-01 PROCEDURE — 93010 ELECTROCARDIOGRAM REPORT: CPT | Performed by: INTERNAL MEDICINE

## 2019-01-01 RX ORDER — DOXYCYCLINE 100 MG/1
100 CAPSULE ORAL EVERY 12 HOURS SCHEDULED
Status: COMPLETED | OUTPATIENT
Start: 2019-01-01 | End: 2019-01-01

## 2019-01-01 RX ORDER — SODIUM CHLORIDE 0.9 % (FLUSH) 0.9 %
10 SYRINGE (ML) INJECTION EVERY 12 HOURS SCHEDULED
Status: DISCONTINUED | OUTPATIENT
Start: 2019-01-01 | End: 2019-01-01 | Stop reason: HOSPADM

## 2019-01-01 RX ORDER — SACCHAROMYCES BOULARDII 250 MG
250 CAPSULE ORAL 2 TIMES DAILY
Status: CANCELLED | OUTPATIENT
Start: 2019-01-01

## 2019-01-01 RX ORDER — PROCHLORPERAZINE MALEATE 10 MG
10 TABLET ORAL ONCE
Status: COMPLETED | OUTPATIENT
Start: 2019-01-01 | End: 2019-01-01

## 2019-01-01 RX ORDER — SODIUM CHLORIDE 9 MG/ML
250 INJECTION, SOLUTION INTRAVENOUS ONCE
Status: CANCELLED | OUTPATIENT
Start: 2019-01-01

## 2019-01-01 RX ORDER — FUROSEMIDE 10 MG/ML
20 INJECTION INTRAMUSCULAR; INTRAVENOUS ONCE
Status: COMPLETED | OUTPATIENT
Start: 2019-01-01 | End: 2019-01-01

## 2019-01-01 RX ORDER — TRAMADOL HYDROCHLORIDE 50 MG/1
50 TABLET ORAL NIGHTLY
Qty: 30 TABLET | Refills: 5 | Status: SHIPPED | OUTPATIENT
Start: 2019-01-01 | End: 2019-01-01

## 2019-01-01 RX ORDER — SODIUM CHLORIDE 0.9 % (FLUSH) 0.9 %
10 SYRINGE (ML) INJECTION AS NEEDED
Status: DISCONTINUED | OUTPATIENT
Start: 2019-01-01 | End: 2019-01-01 | Stop reason: HOSPADM

## 2019-01-01 RX ORDER — DIPHENHYDRAMINE HYDROCHLORIDE 50 MG/ML
50 INJECTION INTRAMUSCULAR; INTRAVENOUS AS NEEDED
Status: DISCONTINUED | OUTPATIENT
Start: 2019-01-01 | End: 2019-01-01 | Stop reason: HOSPADM

## 2019-01-01 RX ORDER — ONDANSETRON HYDROCHLORIDE 8 MG/1
8 TABLET, FILM COATED ORAL 3 TIMES DAILY PRN
Qty: 30 TABLET | Refills: 5 | Status: SHIPPED | OUTPATIENT
Start: 2019-01-01 | End: 2019-01-01

## 2019-01-01 RX ORDER — PALONOSETRON 0.05 MG/ML
0.25 INJECTION, SOLUTION INTRAVENOUS ONCE
Status: COMPLETED | OUTPATIENT
Start: 2019-01-01 | End: 2019-01-01

## 2019-01-01 RX ORDER — LORAZEPAM 2 MG/ML
0.5 INJECTION INTRAMUSCULAR EVERY 4 HOURS PRN
Status: DISCONTINUED | OUTPATIENT
Start: 2019-01-01 | End: 2019-01-01 | Stop reason: HOSPADM

## 2019-01-01 RX ORDER — IPRATROPIUM BROMIDE AND ALBUTEROL SULFATE 2.5; .5 MG/3ML; MG/3ML
3 SOLUTION RESPIRATORY (INHALATION) EVERY 4 HOURS PRN
Status: CANCELLED | OUTPATIENT
Start: 2019-01-01

## 2019-01-01 RX ORDER — SODIUM CHLORIDE 9 MG/ML
250 INJECTION, SOLUTION INTRAVENOUS ONCE
Status: COMPLETED | OUTPATIENT
Start: 2019-01-01 | End: 2019-01-01

## 2019-01-01 RX ORDER — HALOPERIDOL 5 MG/ML
2 INJECTION INTRAMUSCULAR EVERY 4 HOURS PRN
Status: DISCONTINUED | OUTPATIENT
Start: 2019-01-01 | End: 2019-01-01 | Stop reason: HOSPADM

## 2019-01-01 RX ORDER — HALOPERIDOL 5 MG/ML
1 INJECTION INTRAMUSCULAR EVERY 6 HOURS PRN
Status: CANCELLED | OUTPATIENT
Start: 2019-01-01

## 2019-01-01 RX ORDER — PALONOSETRON 0.05 MG/ML
0.25 INJECTION, SOLUTION INTRAVENOUS ONCE
Status: CANCELLED | OUTPATIENT
Start: 2019-01-01

## 2019-01-01 RX ORDER — HALOPERIDOL 5 MG/ML
1 INJECTION INTRAMUSCULAR EVERY 6 HOURS PRN
Status: DISCONTINUED | OUTPATIENT
Start: 2019-01-01 | End: 2019-01-01

## 2019-01-01 RX ORDER — RAMIPRIL 5 MG/1
5 CAPSULE ORAL DAILY
Status: DISCONTINUED | OUTPATIENT
Start: 2019-01-01 | End: 2019-01-01 | Stop reason: HOSPADM

## 2019-01-01 RX ORDER — LORAZEPAM 2 MG/ML
0.5 INJECTION INTRAMUSCULAR EVERY 6 HOURS
Status: DISCONTINUED | OUTPATIENT
Start: 2019-01-01 | End: 2019-01-01

## 2019-01-01 RX ORDER — MORPHINE SULFATE 2 MG/ML
2 INJECTION, SOLUTION INTRAMUSCULAR; INTRAVENOUS
Status: DISCONTINUED | OUTPATIENT
Start: 2019-01-01 | End: 2019-01-01 | Stop reason: HOSPADM

## 2019-01-01 RX ORDER — ATORVASTATIN CALCIUM 10 MG/1
10 TABLET, FILM COATED ORAL NIGHTLY
Status: DISCONTINUED | OUTPATIENT
Start: 2019-01-01 | End: 2019-01-01 | Stop reason: HOSPADM

## 2019-01-01 RX ORDER — KETOROLAC TROMETHAMINE 30 MG/ML
15 INJECTION, SOLUTION INTRAMUSCULAR; INTRAVENOUS EVERY 6 HOURS PRN
Status: DISCONTINUED | OUTPATIENT
Start: 2019-01-01 | End: 2019-01-01 | Stop reason: HOSPADM

## 2019-01-01 RX ORDER — SODIUM CHLORIDE 0.9 % (FLUSH) 0.9 %
20 SYRINGE (ML) INJECTION AS NEEDED
Status: DISCONTINUED | OUTPATIENT
Start: 2019-01-01 | End: 2019-01-01 | Stop reason: HOSPADM

## 2019-01-01 RX ORDER — FAMOTIDINE 10 MG/ML
20 INJECTION, SOLUTION INTRAVENOUS AS NEEDED
Status: CANCELLED | OUTPATIENT
Start: 2019-01-01

## 2019-01-01 RX ORDER — PROCHLORPERAZINE MALEATE 10 MG
10 TABLET ORAL ONCE
Status: CANCELLED | OUTPATIENT
Start: 2019-01-01 | End: 2019-01-01

## 2019-01-01 RX ORDER — DIPHENHYDRAMINE HYDROCHLORIDE 50 MG/ML
50 INJECTION INTRAMUSCULAR; INTRAVENOUS AS NEEDED
Status: CANCELLED | OUTPATIENT
Start: 2019-01-01

## 2019-01-01 RX ORDER — SPIRONOLACTONE AND HYDROCHLOROTHIAZIDE 25; 25 MG/1; MG/1
0.5 TABLET ORAL DAILY
COMMUNITY
Start: 2019-01-01 | End: 2019-01-01 | Stop reason: HOSPADM

## 2019-01-01 RX ORDER — LORAZEPAM 2 MG/ML
0.5 INJECTION INTRAMUSCULAR EVERY 4 HOURS PRN
Status: CANCELLED | OUTPATIENT
Start: 2019-01-01 | End: 2019-12-19

## 2019-01-01 RX ORDER — FAMOTIDINE 10 MG/ML
20 INJECTION, SOLUTION INTRAVENOUS AS NEEDED
Status: DISCONTINUED | OUTPATIENT
Start: 2019-01-01 | End: 2019-01-01 | Stop reason: HOSPADM

## 2019-01-01 RX ORDER — GLYCOPYRROLATE 0.2 MG/ML
0.4 INJECTION INTRAMUSCULAR; INTRAVENOUS EVERY 6 HOURS
Status: DISCONTINUED | OUTPATIENT
Start: 2019-01-01 | End: 2019-01-01 | Stop reason: HOSPADM

## 2019-01-01 RX ORDER — IPRATROPIUM BROMIDE AND ALBUTEROL SULFATE 2.5; .5 MG/3ML; MG/3ML
3 SOLUTION RESPIRATORY (INHALATION) EVERY 4 HOURS PRN
Status: DISCONTINUED | OUTPATIENT
Start: 2019-01-01 | End: 2019-01-01 | Stop reason: HOSPADM

## 2019-01-01 RX ORDER — SODIUM CHLORIDE 0.9 % (FLUSH) 0.9 %
10 SYRINGE (ML) INJECTION AS NEEDED
Status: CANCELLED | OUTPATIENT
Start: 2019-01-01

## 2019-01-01 RX ORDER — GUAIFENESIN 600 MG/1
600 TABLET, EXTENDED RELEASE ORAL EVERY 12 HOURS SCHEDULED
Status: CANCELLED | OUTPATIENT
Start: 2019-01-01

## 2019-01-01 RX ORDER — LORAZEPAM 2 MG/ML
1 INJECTION INTRAMUSCULAR EVERY 4 HOURS PRN
Status: DISCONTINUED | OUTPATIENT
Start: 2019-01-01 | End: 2019-01-01 | Stop reason: HOSPADM

## 2019-01-01 RX ORDER — FAMOTIDINE 10 MG/ML
20 INJECTION, SOLUTION INTRAVENOUS ONCE
Status: CANCELLED | OUTPATIENT
Start: 2019-01-01

## 2019-01-01 RX ORDER — MORPHINE SULFATE 2 MG/ML
2 INJECTION, SOLUTION INTRAMUSCULAR; INTRAVENOUS
Status: CANCELLED | OUTPATIENT
Start: 2019-01-01 | End: 2019-12-21

## 2019-01-01 RX ORDER — HALOPERIDOL 5 MG/ML
2 INJECTION INTRAMUSCULAR EVERY 6 HOURS PRN
Status: DISCONTINUED | OUTPATIENT
Start: 2019-01-01 | End: 2019-01-01

## 2019-01-01 RX ORDER — SODIUM CHLORIDE 9 MG/ML
250 INJECTION, SOLUTION INTRAVENOUS ONCE
Status: DISCONTINUED | OUTPATIENT
Start: 2019-01-01 | End: 2019-01-01 | Stop reason: HOSPADM

## 2019-01-01 RX ORDER — DEXAMETHASONE SODIUM PHOSPHATE 4 MG/ML
4 INJECTION, SOLUTION INTRA-ARTICULAR; INTRALESIONAL; INTRAMUSCULAR; INTRAVENOUS; SOFT TISSUE ONCE
Status: COMPLETED | OUTPATIENT
Start: 2019-01-01 | End: 2019-01-01

## 2019-01-01 RX ORDER — MEMANTINE HYDROCHLORIDE 10 MG/1
10 TABLET ORAL 2 TIMES DAILY
COMMUNITY
End: 2019-01-01

## 2019-01-01 RX ORDER — LEVOFLOXACIN 5 MG/ML
750 INJECTION, SOLUTION INTRAVENOUS ONCE
Status: COMPLETED | OUTPATIENT
Start: 2019-01-01 | End: 2019-01-01

## 2019-01-01 RX ORDER — SODIUM CHLORIDE 0.9 % (FLUSH) 0.9 %
3-10 SYRINGE (ML) INJECTION AS NEEDED
Status: DISCONTINUED | OUTPATIENT
Start: 2019-01-01 | End: 2019-01-01 | Stop reason: HOSPADM

## 2019-01-01 RX ORDER — MAGNESIUM SULFATE HEPTAHYDRATE 40 MG/ML
4 INJECTION, SOLUTION INTRAVENOUS AS NEEDED
Status: DISCONTINUED | OUTPATIENT
Start: 2019-01-01 | End: 2019-01-01 | Stop reason: HOSPADM

## 2019-01-01 RX ORDER — ACETAMINOPHEN 650 MG/1
650 SUPPOSITORY RECTAL EVERY 4 HOURS PRN
Status: DISCONTINUED | OUTPATIENT
Start: 2019-01-01 | End: 2019-01-01 | Stop reason: HOSPADM

## 2019-01-01 RX ORDER — CEFTRIAXONE SODIUM 1 G/50ML
1 INJECTION, SOLUTION INTRAVENOUS EVERY 24 HOURS
Status: COMPLETED | OUTPATIENT
Start: 2019-01-01 | End: 2019-01-01

## 2019-01-01 RX ORDER — LORAZEPAM 2 MG/ML
0.5 INJECTION INTRAMUSCULAR EVERY 6 HOURS
Status: DISCONTINUED | OUTPATIENT
Start: 2019-01-01 | End: 2019-01-01 | Stop reason: HOSPADM

## 2019-01-01 RX ORDER — HYDROMORPHONE HYDROCHLORIDE 1 MG/ML
0.5 INJECTION, SOLUTION INTRAMUSCULAR; INTRAVENOUS; SUBCUTANEOUS
Status: DISCONTINUED | OUTPATIENT
Start: 2019-01-01 | End: 2019-01-01 | Stop reason: HOSPADM

## 2019-01-01 RX ORDER — FAMOTIDINE 10 MG/ML
20 INJECTION, SOLUTION INTRAVENOUS ONCE
Status: COMPLETED | OUTPATIENT
Start: 2019-01-01 | End: 2019-01-01

## 2019-01-01 RX ORDER — PALONOSETRON 0.05 MG/ML
0.25 INJECTION, SOLUTION INTRAVENOUS ONCE
Status: DISCONTINUED | OUTPATIENT
Start: 2019-01-01 | End: 2019-01-01

## 2019-01-01 RX ORDER — DOXYCYCLINE HYCLATE 100 MG/1
100 TABLET, DELAYED RELEASE ORAL 2 TIMES DAILY
Qty: 20 TABLET | Refills: 0 | Status: SHIPPED | OUTPATIENT
Start: 2019-01-01 | End: 2019-01-01

## 2019-01-01 RX ORDER — ONDANSETRON HYDROCHLORIDE 8 MG/1
TABLET, FILM COATED ORAL EVERY 8 HOURS PRN
COMMUNITY
End: 2019-01-01

## 2019-01-01 RX ORDER — ONDANSETRON 4 MG/1
4 TABLET, FILM COATED ORAL EVERY 6 HOURS PRN
Status: CANCELLED | OUTPATIENT
Start: 2019-01-01

## 2019-01-01 RX ORDER — MAGNESIUM SULFATE HEPTAHYDRATE 40 MG/ML
2 INJECTION, SOLUTION INTRAVENOUS AS NEEDED
Status: DISCONTINUED | OUTPATIENT
Start: 2019-01-01 | End: 2019-01-01 | Stop reason: HOSPADM

## 2019-01-01 RX ORDER — ONDANSETRON 2 MG/ML
4 INJECTION INTRAMUSCULAR; INTRAVENOUS EVERY 6 HOURS PRN
Status: DISCONTINUED | OUTPATIENT
Start: 2019-01-01 | End: 2019-01-01 | Stop reason: HOSPADM

## 2019-01-01 RX ORDER — PRAVASTATIN SODIUM 80 MG/1
80 TABLET ORAL DAILY
COMMUNITY
End: 2019-01-01

## 2019-01-01 RX ORDER — IPRATROPIUM BROMIDE AND ALBUTEROL SULFATE 2.5; .5 MG/3ML; MG/3ML
3 SOLUTION RESPIRATORY (INHALATION)
Status: DISCONTINUED | OUTPATIENT
Start: 2019-01-01 | End: 2019-01-01 | Stop reason: HOSPADM

## 2019-01-01 RX ORDER — POLYETHYLENE GLYCOL 3350 17 G/17G
17 POWDER, FOR SOLUTION ORAL 2 TIMES DAILY
Status: CANCELLED | OUTPATIENT
Start: 2019-01-01

## 2019-01-01 RX ORDER — FUROSEMIDE 10 MG/ML
40 INJECTION INTRAMUSCULAR; INTRAVENOUS ONCE
Status: COMPLETED | OUTPATIENT
Start: 2019-01-01 | End: 2019-01-01

## 2019-01-01 RX ORDER — IPRATROPIUM BROMIDE AND ALBUTEROL SULFATE 2.5; .5 MG/3ML; MG/3ML
3 SOLUTION RESPIRATORY (INHALATION)
Status: CANCELLED | OUTPATIENT
Start: 2019-01-01

## 2019-01-01 RX ORDER — PROMETHAZINE HYDROCHLORIDE 25 MG/ML
12.5 INJECTION, SOLUTION INTRAMUSCULAR; INTRAVENOUS ONCE
Status: COMPLETED | OUTPATIENT
Start: 2019-01-01 | End: 2019-01-01

## 2019-01-01 RX ORDER — MEMANTINE HYDROCHLORIDE 10 MG/1
10 TABLET ORAL 2 TIMES DAILY
Qty: 60 TABLET | Refills: 2 | Status: SHIPPED | OUTPATIENT
Start: 2019-01-01 | End: 2019-01-01

## 2019-01-01 RX ORDER — LORAZEPAM 2 MG/ML
1 INJECTION INTRAMUSCULAR EVERY 6 HOURS
Status: DISCONTINUED | OUTPATIENT
Start: 2019-01-01 | End: 2019-01-01

## 2019-01-01 RX ORDER — GLYCOPYRROLATE 0.2 MG/ML
0.1 INJECTION INTRAMUSCULAR; INTRAVENOUS EVERY 4 HOURS PRN
Status: CANCELLED | OUTPATIENT
Start: 2019-01-01

## 2019-01-01 RX ORDER — TRAMADOL HYDROCHLORIDE 50 MG/1
50 TABLET ORAL EVERY 6 HOURS PRN
COMMUNITY
End: 2019-01-01

## 2019-01-01 RX ORDER — LIDOCAINE HYDROCHLORIDE 10 MG/ML
10 INJECTION, SOLUTION INFILTRATION; PERINEURAL ONCE
Status: COMPLETED | OUTPATIENT
Start: 2019-01-01 | End: 2019-01-01

## 2019-01-01 RX ORDER — IPRATROPIUM BROMIDE AND ALBUTEROL SULFATE 2.5; .5 MG/3ML; MG/3ML
3 SOLUTION RESPIRATORY (INHALATION)
Status: DISCONTINUED | OUTPATIENT
Start: 2019-01-01 | End: 2019-01-01

## 2019-01-01 RX ORDER — GUAIFENESIN 600 MG/1
600 TABLET, EXTENDED RELEASE ORAL EVERY 12 HOURS SCHEDULED
Status: DISCONTINUED | OUTPATIENT
Start: 2019-01-01 | End: 2019-01-01 | Stop reason: HOSPADM

## 2019-01-01 RX ORDER — CLOPIDOGREL BISULFATE 75 MG/1
75 TABLET ORAL DAILY
Qty: 90 TABLET | Refills: 3 | Status: SHIPPED | OUTPATIENT
Start: 2019-01-01 | End: 2019-01-01 | Stop reason: HOSPADM

## 2019-01-01 RX ORDER — HYDROMORPHONE HYDROCHLORIDE 1 MG/ML
0.5 INJECTION, SOLUTION INTRAMUSCULAR; INTRAVENOUS; SUBCUTANEOUS EVERY 6 HOURS
Status: DISCONTINUED | OUTPATIENT
Start: 2019-01-01 | End: 2019-01-01 | Stop reason: HOSPADM

## 2019-01-01 RX ORDER — GLYCOPYRROLATE 0.2 MG/ML
0.1 INJECTION INTRAMUSCULAR; INTRAVENOUS EVERY 4 HOURS PRN
Status: DISCONTINUED | OUTPATIENT
Start: 2019-01-01 | End: 2019-01-01 | Stop reason: HOSPADM

## 2019-01-01 RX ORDER — TRAMADOL HYDROCHLORIDE 50 MG/1
50 TABLET ORAL NIGHTLY
Status: DISCONTINUED | OUTPATIENT
Start: 2019-01-01 | End: 2019-01-01 | Stop reason: HOSPADM

## 2019-01-01 RX ORDER — SODIUM POLYSTYRENE SULFONATE 15 G/60ML
15 SUSPENSION ORAL; RECTAL ONCE
Status: DISCONTINUED | OUTPATIENT
Start: 2019-01-01 | End: 2019-01-01 | Stop reason: RX

## 2019-01-01 RX ORDER — METOPROLOL SUCCINATE 50 MG/1
50 TABLET, EXTENDED RELEASE ORAL DAILY
Status: DISCONTINUED | OUTPATIENT
Start: 2019-01-01 | End: 2019-01-01 | Stop reason: HOSPADM

## 2019-01-01 RX ORDER — CEFTRIAXONE SODIUM 1 G/50ML
1 INJECTION, SOLUTION INTRAVENOUS ONCE
Status: COMPLETED | OUTPATIENT
Start: 2019-01-01 | End: 2019-01-01

## 2019-01-01 RX ORDER — ONDANSETRON 4 MG/1
4 TABLET, FILM COATED ORAL EVERY 6 HOURS PRN
Status: DISCONTINUED | OUTPATIENT
Start: 2019-01-01 | End: 2019-01-01 | Stop reason: HOSPADM

## 2019-01-01 RX ORDER — METOPROLOL SUCCINATE 50 MG/1
50 TABLET, EXTENDED RELEASE ORAL DAILY
COMMUNITY
End: 2019-01-01

## 2019-01-01 RX ORDER — SODIUM CHLORIDE, SODIUM LACTATE, POTASSIUM CHLORIDE, CALCIUM CHLORIDE 600; 310; 30; 20 MG/100ML; MG/100ML; MG/100ML; MG/100ML
100 INJECTION, SOLUTION INTRAVENOUS CONTINUOUS
Status: DISCONTINUED | OUTPATIENT
Start: 2019-01-01 | End: 2019-01-01

## 2019-01-01 RX ORDER — PRAVASTATIN SODIUM 80 MG/1
80 TABLET ORAL NIGHTLY
COMMUNITY
End: 2019-01-01

## 2019-01-01 RX ORDER — HALOPERIDOL 5 MG/ML
1 INJECTION INTRAMUSCULAR EVERY 6 HOURS PRN
Status: DISCONTINUED | OUTPATIENT
Start: 2019-01-01 | End: 2019-01-01 | Stop reason: HOSPADM

## 2019-01-01 RX ORDER — GLYCOPYRROLATE 0.2 MG/ML
0.4 INJECTION INTRAMUSCULAR; INTRAVENOUS EVERY 6 HOURS
Status: CANCELLED | OUTPATIENT
Start: 2019-01-01

## 2019-01-01 RX ORDER — POTASSIUM CHLORIDE 7.45 MG/ML
10 INJECTION INTRAVENOUS
Status: DISCONTINUED | OUTPATIENT
Start: 2019-01-01 | End: 2019-01-01 | Stop reason: HOSPADM

## 2019-01-01 RX ORDER — SENNA AND DOCUSATE SODIUM 50; 8.6 MG/1; MG/1
2 TABLET, FILM COATED ORAL NIGHTLY
Status: DISCONTINUED | OUTPATIENT
Start: 2019-01-01 | End: 2019-01-01 | Stop reason: HOSPADM

## 2019-01-01 RX ORDER — POLYETHYLENE GLYCOL 3350 17 G/17G
17 POWDER, FOR SOLUTION ORAL 2 TIMES DAILY
Status: DISCONTINUED | OUTPATIENT
Start: 2019-01-01 | End: 2019-01-01 | Stop reason: HOSPADM

## 2019-01-01 RX ORDER — ACETAMINOPHEN 325 MG/1
650 TABLET ORAL EVERY 4 HOURS PRN
Status: DISCONTINUED | OUTPATIENT
Start: 2019-01-01 | End: 2019-01-01 | Stop reason: HOSPADM

## 2019-01-01 RX ORDER — HYDROMORPHONE HYDROCHLORIDE 1 MG/ML
0.5 INJECTION, SOLUTION INTRAMUSCULAR; INTRAVENOUS; SUBCUTANEOUS EVERY 6 HOURS
Status: DISCONTINUED | OUTPATIENT
Start: 2019-01-01 | End: 2019-01-01

## 2019-01-01 RX ORDER — HEPARIN SODIUM 5000 [USP'U]/ML
5000 INJECTION, SOLUTION INTRAVENOUS; SUBCUTANEOUS EVERY 8 HOURS SCHEDULED
Status: CANCELLED | OUTPATIENT
Start: 2019-01-01

## 2019-01-01 RX ORDER — SODIUM CHLORIDE 0.9 % (FLUSH) 0.9 %
10 SYRINGE (ML) INJECTION EVERY 12 HOURS SCHEDULED
Status: CANCELLED | OUTPATIENT
Start: 2019-01-01

## 2019-01-01 RX ORDER — HYDROMORPHONE HYDROCHLORIDE 1 MG/ML
0.5 INJECTION, SOLUTION INTRAMUSCULAR; INTRAVENOUS; SUBCUTANEOUS EVERY 6 HOURS
Status: CANCELLED | OUTPATIENT
Start: 2019-01-01 | End: 2019-12-21

## 2019-01-01 RX ORDER — ONDANSETRON 2 MG/ML
4 INJECTION INTRAMUSCULAR; INTRAVENOUS EVERY 6 HOURS PRN
Status: CANCELLED | OUTPATIENT
Start: 2019-01-01

## 2019-01-01 RX ORDER — SACCHAROMYCES BOULARDII 250 MG
250 CAPSULE ORAL 2 TIMES DAILY
Status: DISCONTINUED | OUTPATIENT
Start: 2019-01-01 | End: 2019-01-01 | Stop reason: HOSPADM

## 2019-01-01 RX ORDER — NALOXONE HCL 0.4 MG/ML
0.1 VIAL (ML) INJECTION
Status: DISCONTINUED | OUTPATIENT
Start: 2019-01-01 | End: 2019-01-01

## 2019-01-01 RX ORDER — HALOPERIDOL 5 MG/ML
1 INJECTION INTRAMUSCULAR EVERY 6 HOURS
Status: DISCONTINUED | OUTPATIENT
Start: 2019-01-01 | End: 2019-01-01 | Stop reason: HOSPADM

## 2019-01-01 RX ORDER — DEXAMETHASONE 4 MG/1
TABLET ORAL
COMMUNITY
Start: 2019-01-01 | End: 2019-01-01

## 2019-01-01 RX ORDER — MORPHINE SULFATE 4 MG/ML
4 INJECTION, SOLUTION INTRAMUSCULAR; INTRAVENOUS ONCE
Status: COMPLETED | OUTPATIENT
Start: 2019-01-01 | End: 2019-01-01

## 2019-01-01 RX ORDER — CEPHRADINE 500 MG
1000 CAPSULE ORAL 2 TIMES DAILY
COMMUNITY
End: 2019-01-01

## 2019-01-01 RX ORDER — GLYCOPYRROLATE 0.2 MG/ML
0.2 INJECTION INTRAMUSCULAR; INTRAVENOUS EVERY 4 HOURS PRN
Status: DISCONTINUED | OUTPATIENT
Start: 2019-01-01 | End: 2019-01-01 | Stop reason: HOSPADM

## 2019-01-01 RX ORDER — HYDROMORPHONE HYDROCHLORIDE 1 MG/ML
0.5 INJECTION, SOLUTION INTRAMUSCULAR; INTRAVENOUS; SUBCUTANEOUS
Status: CANCELLED | OUTPATIENT
Start: 2019-01-01 | End: 2019-12-18

## 2019-01-01 RX ORDER — NITROGLYCERIN 0.4 MG/1
0.4 TABLET SUBLINGUAL
Qty: 30 TABLET | Refills: 11 | Status: SHIPPED | OUTPATIENT
Start: 2019-01-01 | End: 2019-01-01

## 2019-01-01 RX ORDER — METOPROLOL SUCCINATE 50 MG/1
50 TABLET, EXTENDED RELEASE ORAL DAILY
COMMUNITY

## 2019-01-01 RX ORDER — HYDROMORPHONE HYDROCHLORIDE 1 MG/ML
0.5 INJECTION, SOLUTION INTRAMUSCULAR; INTRAVENOUS; SUBCUTANEOUS
Status: DISCONTINUED | OUTPATIENT
Start: 2019-01-01 | End: 2019-01-01

## 2019-01-01 RX ORDER — PRAVASTATIN SODIUM 80 MG/1
80 TABLET ORAL DAILY
Qty: 90 TABLET | Refills: 3 | Status: SHIPPED | OUTPATIENT
Start: 2019-01-01 | End: 2019-01-01

## 2019-01-01 RX ORDER — HALOPERIDOL 5 MG/ML
0.5 INJECTION INTRAMUSCULAR EVERY 6 HOURS PRN
Status: DISCONTINUED | OUTPATIENT
Start: 2019-01-01 | End: 2019-01-01

## 2019-01-01 RX ORDER — BISACODYL 10 MG
10 SUPPOSITORY, RECTAL RECTAL DAILY PRN
Status: DISCONTINUED | OUTPATIENT
Start: 2019-01-01 | End: 2019-01-01 | Stop reason: HOSPADM

## 2019-01-01 RX ORDER — ASPIRIN 81 MG/1
81 TABLET ORAL DAILY
Status: DISCONTINUED | OUTPATIENT
Start: 2019-01-01 | End: 2019-01-01

## 2019-01-01 RX ORDER — HEPARIN SODIUM 5000 [USP'U]/ML
5000 INJECTION, SOLUTION INTRAVENOUS; SUBCUTANEOUS EVERY 8 HOURS SCHEDULED
Status: DISCONTINUED | OUTPATIENT
Start: 2019-01-01 | End: 2019-01-01 | Stop reason: HOSPADM

## 2019-01-01 RX ORDER — METOPROLOL SUCCINATE 50 MG/1
50 TABLET, EXTENDED RELEASE ORAL DAILY
Status: CANCELLED | OUTPATIENT
Start: 2019-01-01

## 2019-01-01 RX ORDER — SODIUM CHLORIDE 0.9 % (FLUSH) 0.9 %
3 SYRINGE (ML) INJECTION EVERY 12 HOURS SCHEDULED
Status: DISCONTINUED | OUTPATIENT
Start: 2019-01-01 | End: 2019-01-01 | Stop reason: HOSPADM

## 2019-01-01 RX ORDER — NITROGLYCERIN 0.4 MG/1
0.4 TABLET SUBLINGUAL
COMMUNITY
End: 2019-01-01

## 2019-01-01 RX ORDER — POLYETHYLENE GLYCOL 3350 17 G/17G
17 POWDER, FOR SOLUTION ORAL 2 TIMES DAILY
Qty: 500 G | Refills: 0 | Status: SHIPPED | OUTPATIENT
Start: 2019-01-01 | End: 2019-01-01

## 2019-01-01 RX ORDER — RAMIPRIL 5 MG/1
5 CAPSULE ORAL DAILY
Qty: 90 CAPSULE | Refills: 3
Start: 2019-01-01 | End: 2019-01-01

## 2019-01-01 RX ADMIN — PROCHLORPERAZINE MALEATE 10 MG: 10 TABLET ORAL at 08:55

## 2019-01-01 RX ADMIN — DOXYCYCLINE 100 MG: 100 CAPSULE ORAL at 21:54

## 2019-01-01 RX ADMIN — ETOPOSIDE 160 MG: 20 INJECTION, SOLUTION INTRAVENOUS at 12:24

## 2019-01-01 RX ADMIN — ATORVASTATIN CALCIUM 10 MG: 10 TABLET, FILM COATED ORAL at 20:45

## 2019-01-01 RX ADMIN — DIATRIZOATE MEGLUMINE AND DIATRIZOATE SODIUM 15 ML: 660; 100 LIQUID ORAL; RECTAL at 14:00

## 2019-01-01 RX ADMIN — SODIUM CHLORIDE, PRESERVATIVE FREE 3 ML: 5 INJECTION INTRAVENOUS at 20:24

## 2019-01-01 RX ADMIN — METOPROLOL SUCCINATE 50 MG: 50 TABLET, EXTENDED RELEASE ORAL at 08:43

## 2019-01-01 RX ADMIN — ATEZOLIZUMAB 1200 MG: 1200 INJECTION, SOLUTION INTRAVENOUS at 10:26

## 2019-01-01 RX ADMIN — DOXYCYCLINE 100 MG: 100 INJECTION, POWDER, LYOPHILIZED, FOR SOLUTION INTRAVENOUS at 13:20

## 2019-01-01 RX ADMIN — ATEZOLIZUMAB 1200 MG: 1200 INJECTION, SOLUTION INTRAVENOUS at 10:20

## 2019-01-01 RX ADMIN — HEPARIN SODIUM 5000 UNITS: 5000 INJECTION, SOLUTION INTRAVENOUS; SUBCUTANEOUS at 05:52

## 2019-01-01 RX ADMIN — TRAMADOL HYDROCHLORIDE 50 MG: 50 TABLET, COATED ORAL at 20:34

## 2019-01-01 RX ADMIN — HYDROMORPHONE HYDROCHLORIDE 0.5 MG: 1 INJECTION, SOLUTION INTRAMUSCULAR; INTRAVENOUS; SUBCUTANEOUS at 17:52

## 2019-01-01 RX ADMIN — PROCHLORPERAZINE MALEATE 10 MG: 10 TABLET ORAL at 14:59

## 2019-01-01 RX ADMIN — SODIUM CHLORIDE 250 ML: 9 INJECTION, SOLUTION INTRAVENOUS at 12:01

## 2019-01-01 RX ADMIN — METOPROLOL SUCCINATE 50 MG: 50 TABLET, EXTENDED RELEASE ORAL at 08:29

## 2019-01-01 RX ADMIN — ONDANSETRON 16 MG: 2 INJECTION INTRAMUSCULAR; INTRAVENOUS at 11:34

## 2019-01-01 RX ADMIN — IPRATROPIUM BROMIDE AND ALBUTEROL SULFATE 3 ML: 2.5; .5 SOLUTION RESPIRATORY (INHALATION) at 20:10

## 2019-01-01 RX ADMIN — HEPARIN 500 UNITS: 100 SYRINGE at 12:40

## 2019-01-01 RX ADMIN — SODIUM CHLORIDE 250 ML: 9 INJECTION, SOLUTION INTRAVENOUS at 13:44

## 2019-01-01 RX ADMIN — DOCUSATE SODIUM AND SENNOSIDES 2 TABLET: 50; 8.6 TABLET ORAL at 21:38

## 2019-01-01 RX ADMIN — SODIUM CHLORIDE, PRESERVATIVE FREE 3 ML: 5 INJECTION INTRAVENOUS at 21:38

## 2019-01-01 RX ADMIN — PATIROMER 1 PACKET: 8.4 POWDER, FOR SUSPENSION ORAL at 14:53

## 2019-01-01 RX ADMIN — DEXAMETHASONE SODIUM PHOSPHATE 12 MG: 4 INJECTION, SOLUTION INTRAMUSCULAR; INTRAVENOUS at 11:05

## 2019-01-01 RX ADMIN — FUROSEMIDE 20 MG: 10 INJECTION, SOLUTION INTRAMUSCULAR; INTRAVENOUS at 04:00

## 2019-01-01 RX ADMIN — Medication 250 MG: at 20:16

## 2019-01-01 RX ADMIN — DOCUSATE SODIUM AND SENNOSIDES 2 TABLET: 50; 8.6 TABLET ORAL at 20:22

## 2019-01-01 RX ADMIN — Medication 20 ML: at 14:49

## 2019-01-01 RX ADMIN — SODIUM CHLORIDE 250 ML: 9 INJECTION, SOLUTION INTRAVENOUS at 11:33

## 2019-01-01 RX ADMIN — IPRATROPIUM BROMIDE AND ALBUTEROL SULFATE 3 ML: 2.5; .5 SOLUTION RESPIRATORY (INHALATION) at 15:24

## 2019-01-01 RX ADMIN — HALOPERIDOL LACTATE 0.5 MG: 5 INJECTION INTRAMUSCULAR at 23:26

## 2019-01-01 RX ADMIN — DEXAMETHASONE SODIUM PHOSPHATE 12 MG: 4 INJECTION, SOLUTION INTRAMUSCULAR; INTRAVENOUS at 10:52

## 2019-01-01 RX ADMIN — DOXYCYCLINE 100 MG: 100 CAPSULE ORAL at 11:48

## 2019-01-01 RX ADMIN — DOXYCYCLINE 100 MG: 100 INJECTION, POWDER, LYOPHILIZED, FOR SOLUTION INTRAVENOUS at 11:58

## 2019-01-01 RX ADMIN — CARBOPLATIN 480 MG: 10 INJECTION, SOLUTION INTRAVENOUS at 11:25

## 2019-01-01 RX ADMIN — SODIUM CHLORIDE, PRESERVATIVE FREE 3 ML: 5 INJECTION INTRAVENOUS at 20:04

## 2019-01-01 RX ADMIN — ASPIRIN 81 MG: 81 TABLET, COATED ORAL at 09:37

## 2019-01-01 RX ADMIN — HYDROMORPHONE HYDROCHLORIDE 0.5 MG: 1 INJECTION, SOLUTION INTRAMUSCULAR; INTRAVENOUS; SUBCUTANEOUS at 08:17

## 2019-01-01 RX ADMIN — TRAMADOL HYDROCHLORIDE 50 MG: 50 TABLET, COATED ORAL at 22:01

## 2019-01-01 RX ADMIN — HEPARIN 500 UNITS: 100 SYRINGE at 11:02

## 2019-01-01 RX ADMIN — DOXYCYCLINE 100 MG: 100 INJECTION, POWDER, LYOPHILIZED, FOR SOLUTION INTRAVENOUS at 01:25

## 2019-01-01 RX ADMIN — SODIUM CHLORIDE 150 MG: 9 INJECTION, SOLUTION INTRAVENOUS at 11:16

## 2019-01-01 RX ADMIN — VANCOMYCIN HYDROCHLORIDE 1750 MG: 10 INJECTION, POWDER, LYOPHILIZED, FOR SOLUTION INTRAVENOUS at 21:42

## 2019-01-01 RX ADMIN — NITROGLYCERIN 1 INCH: 20 OINTMENT TOPICAL at 08:48

## 2019-01-01 RX ADMIN — POLYETHYLENE GLYCOL 3350 17 G: 17 POWDER, FOR SOLUTION ORAL at 08:43

## 2019-01-01 RX ADMIN — HYDROMORPHONE HYDROCHLORIDE 0.5 MG: 1 INJECTION, SOLUTION INTRAMUSCULAR; INTRAVENOUS; SUBCUTANEOUS at 05:24

## 2019-01-01 RX ADMIN — PALONOSETRON HYDROCHLORIDE 0.25 MG: 0.25 INJECTION, SOLUTION INTRAVENOUS at 09:30

## 2019-01-01 RX ADMIN — POLYETHYLENE GLYCOL 3350 17 G: 17 POWDER, FOR SOLUTION ORAL at 21:42

## 2019-01-01 RX ADMIN — GUAIFENESIN 600 MG: 600 TABLET, EXTENDED RELEASE ORAL at 20:33

## 2019-01-01 RX ADMIN — GUAIFENESIN 600 MG: 600 TABLET, EXTENDED RELEASE ORAL at 22:06

## 2019-01-01 RX ADMIN — TRAMADOL HYDROCHLORIDE 50 MG: 50 TABLET, COATED ORAL at 20:05

## 2019-01-01 RX ADMIN — DOCUSATE SODIUM AND SENNOSIDES 2 TABLET: 50; 8.6 TABLET ORAL at 20:45

## 2019-01-01 RX ADMIN — HALOPERIDOL LACTATE 1 MG: 5 INJECTION, SOLUTION INTRAMUSCULAR at 05:25

## 2019-01-01 RX ADMIN — ATORVASTATIN CALCIUM 10 MG: 10 TABLET, FILM COATED ORAL at 21:54

## 2019-01-01 RX ADMIN — DOXYCYCLINE 100 MG: 100 INJECTION, POWDER, LYOPHILIZED, FOR SOLUTION INTRAVENOUS at 14:30

## 2019-01-01 RX ADMIN — Medication 250 MG: at 20:33

## 2019-01-01 RX ADMIN — FUROSEMIDE 40 MG: 10 INJECTION, SOLUTION INTRAMUSCULAR; INTRAVENOUS at 18:48

## 2019-01-01 RX ADMIN — HALOPERIDOL LACTATE 1 MG: 5 INJECTION, SOLUTION INTRAMUSCULAR at 11:58

## 2019-01-01 RX ADMIN — HYDROMORPHONE HYDROCHLORIDE 0.5 MG: 1 INJECTION, SOLUTION INTRAMUSCULAR; INTRAVENOUS; SUBCUTANEOUS at 23:37

## 2019-01-01 RX ADMIN — METOPROLOL SUCCINATE 50 MG: 50 TABLET, EXTENDED RELEASE ORAL at 10:20

## 2019-01-01 RX ADMIN — HEPARIN SODIUM 5000 UNITS: 5000 INJECTION, SOLUTION INTRAVENOUS; SUBCUTANEOUS at 06:36

## 2019-01-01 RX ADMIN — IOPAMIDOL 90 ML: 612 INJECTION, SOLUTION INTRAVENOUS at 15:50

## 2019-01-01 RX ADMIN — IOPAMIDOL 95 ML: 612 INJECTION, SOLUTION INTRAVENOUS at 14:30

## 2019-01-01 RX ADMIN — LORAZEPAM 0.5 MG: 2 INJECTION INTRAMUSCULAR; INTRAVENOUS at 20:28

## 2019-01-01 RX ADMIN — MORPHINE SULFATE 4 MG: 4 INJECTION, SOLUTION INTRAMUSCULAR; INTRAVENOUS at 19:08

## 2019-01-01 RX ADMIN — SODIUM CHLORIDE, PRESERVATIVE FREE 10 ML: 5 INJECTION INTRAVENOUS at 08:43

## 2019-01-01 RX ADMIN — IPRATROPIUM BROMIDE AND ALBUTEROL SULFATE 3 ML: 2.5; .5 SOLUTION RESPIRATORY (INHALATION) at 13:12

## 2019-01-01 RX ADMIN — TRAMADOL HYDROCHLORIDE 50 MG: 50 TABLET, COATED ORAL at 21:54

## 2019-01-01 RX ADMIN — IPRATROPIUM BROMIDE AND ALBUTEROL SULFATE 3 ML: 2.5; .5 SOLUTION RESPIRATORY (INHALATION) at 07:43

## 2019-01-01 RX ADMIN — ONDANSETRON 16 MG: 2 INJECTION INTRAMUSCULAR; INTRAVENOUS at 10:07

## 2019-01-01 RX ADMIN — MORPHINE SULFATE 2 MG: 2 INJECTION, SOLUTION INTRAMUSCULAR; INTRAVENOUS at 09:23

## 2019-01-01 RX ADMIN — GADOBENATE DIMEGLUMINE 15 ML: 529 INJECTION, SOLUTION INTRAVENOUS at 12:25

## 2019-01-01 RX ADMIN — PALONOSETRON HYDROCHLORIDE 0.25 MG: 0.25 INJECTION, SOLUTION INTRAVENOUS at 10:05

## 2019-01-01 RX ADMIN — ETOPOSIDE 160 MG: 20 INJECTION, SOLUTION INTRAVENOUS at 14:15

## 2019-01-01 RX ADMIN — IPRATROPIUM BROMIDE AND ALBUTEROL SULFATE 3 ML: 2.5; .5 SOLUTION RESPIRATORY (INHALATION) at 23:52

## 2019-01-01 RX ADMIN — SODIUM CHLORIDE, PRESERVATIVE FREE 3 ML: 5 INJECTION INTRAVENOUS at 12:28

## 2019-01-01 RX ADMIN — ALTEPLASE 10 MG: 2.2 INJECTION, POWDER, LYOPHILIZED, FOR SOLUTION INTRAVENOUS at 17:09

## 2019-01-01 RX ADMIN — SODIUM CHLORIDE 250 ML: 9 INJECTION, SOLUTION INTRAVENOUS at 09:30

## 2019-01-01 RX ADMIN — ATORVASTATIN CALCIUM 10 MG: 10 TABLET, FILM COATED ORAL at 22:01

## 2019-01-01 RX ADMIN — DEXAMETHASONE SODIUM PHOSPHATE 12 MG: 4 INJECTION, SOLUTION INTRAMUSCULAR; INTRAVENOUS at 09:59

## 2019-01-01 RX ADMIN — METOPROLOL SUCCINATE 50 MG: 50 TABLET, EXTENDED RELEASE ORAL at 08:14

## 2019-01-01 RX ADMIN — CARBOPLATIN 530 MG: 10 INJECTION, SOLUTION INTRAVENOUS at 11:22

## 2019-01-01 RX ADMIN — DOCUSATE SODIUM AND SENNOSIDES 2 TABLET: 50; 8.6 TABLET ORAL at 20:05

## 2019-01-01 RX ADMIN — HALOPERIDOL LACTATE 1 MG: 5 INJECTION, SOLUTION INTRAMUSCULAR at 05:13

## 2019-01-01 RX ADMIN — ATEZOLIZUMAB 1200 MG: 1200 INJECTION, SOLUTION INTRAVENOUS at 09:40

## 2019-01-01 RX ADMIN — LORAZEPAM 0.5 MG: 2 INJECTION INTRAMUSCULAR; INTRAVENOUS at 15:37

## 2019-01-01 RX ADMIN — DOXYCYCLINE 100 MG: 100 INJECTION, POWDER, LYOPHILIZED, FOR SOLUTION INTRAVENOUS at 01:53

## 2019-01-01 RX ADMIN — ONDANSETRON 16 MG: 2 INJECTION INTRAMUSCULAR; INTRAVENOUS at 11:16

## 2019-01-01 RX ADMIN — SODIUM CHLORIDE 1000 ML: 9 INJECTION, SOLUTION INTRAVENOUS at 17:16

## 2019-01-01 RX ADMIN — RAMIPRIL 5 MG: 5 CAPSULE ORAL at 08:26

## 2019-01-01 RX ADMIN — LORAZEPAM 0.5 MG: 2 INJECTION INTRAMUSCULAR; INTRAVENOUS at 02:22

## 2019-01-01 RX ADMIN — PROMETHAZINE HYDROCHLORIDE 12.5 MG: 25 INJECTION INTRAMUSCULAR; INTRAVENOUS at 12:08

## 2019-01-01 RX ADMIN — ATORVASTATIN CALCIUM 10 MG: 10 TABLET, FILM COATED ORAL at 21:38

## 2019-01-01 RX ADMIN — HYDROMORPHONE HYDROCHLORIDE 0.5 MG: 1 INJECTION, SOLUTION INTRAMUSCULAR; INTRAVENOUS; SUBCUTANEOUS at 03:14

## 2019-01-01 RX ADMIN — PACLITAXEL 155 MG: 6 INJECTION, SOLUTION INTRAVENOUS at 10:22

## 2019-01-01 RX ADMIN — TAZOBACTAM SODIUM AND PIPERACILLIN SODIUM 3.38 G: 375; 3 INJECTION, SOLUTION INTRAVENOUS at 08:42

## 2019-01-01 RX ADMIN — RAMIPRIL 5 MG: 5 CAPSULE ORAL at 11:57

## 2019-01-01 RX ADMIN — HEPARIN SODIUM 5000 UNITS: 5000 INJECTION, SOLUTION INTRAVENOUS; SUBCUTANEOUS at 21:42

## 2019-01-01 RX ADMIN — RAMIPRIL 5 MG: 5 CAPSULE ORAL at 08:43

## 2019-01-01 RX ADMIN — Medication 250 MG: at 21:42

## 2019-01-01 RX ADMIN — DEXAMETHASONE SODIUM PHOSPHATE 12 MG: 4 INJECTION, SOLUTION INTRAMUSCULAR; INTRAVENOUS at 12:01

## 2019-01-01 RX ADMIN — HEPARIN 500 UNITS: 100 SYRINGE at 12:15

## 2019-01-01 RX ADMIN — SODIUM CHLORIDE 250 ML: 9 INJECTION, SOLUTION INTRAVENOUS at 10:05

## 2019-01-01 RX ADMIN — GADOBENATE DIMEGLUMINE 17 ML: 529 INJECTION, SOLUTION INTRAVENOUS at 15:10

## 2019-01-01 RX ADMIN — SODIUM CHLORIDE, PRESERVATIVE FREE 3 ML: 5 INJECTION INTRAVENOUS at 09:06

## 2019-01-01 RX ADMIN — SODIUM CHLORIDE, PRESERVATIVE FREE 10 ML: 5 INJECTION INTRAVENOUS at 20:34

## 2019-01-01 RX ADMIN — MORPHINE SULFATE 4 MG: 4 INJECTION, SOLUTION INTRAMUSCULAR; INTRAVENOUS at 17:19

## 2019-01-01 RX ADMIN — CEFTRIAXONE SODIUM 1 G: 1 INJECTION, SOLUTION INTRAVENOUS at 10:20

## 2019-01-01 RX ADMIN — PROCHLORPERAZINE MALEATE 10 MG: 10 TABLET ORAL at 08:27

## 2019-01-01 RX ADMIN — Medication: at 18:49

## 2019-01-01 RX ADMIN — LORAZEPAM 0.5 MG: 2 INJECTION INTRAMUSCULAR; INTRAVENOUS at 20:31

## 2019-01-01 RX ADMIN — DIPHENHYDRAMINE HYDROCHLORIDE 25 MG: 50 INJECTION INTRAMUSCULAR; INTRAVENOUS at 10:42

## 2019-01-01 RX ADMIN — HEPARIN 500 UNITS: 100 SYRINGE at 13:01

## 2019-01-01 RX ADMIN — IPRATROPIUM BROMIDE AND ALBUTEROL SULFATE 3 ML: 2.5; .5 SOLUTION RESPIRATORY (INHALATION) at 07:34

## 2019-01-01 RX ADMIN — SODIUM CHLORIDE, PRESERVATIVE FREE 3 ML: 5 INJECTION INTRAVENOUS at 09:30

## 2019-01-01 RX ADMIN — SODIUM CHLORIDE, PRESERVATIVE FREE 3 ML: 5 INJECTION INTRAVENOUS at 10:22

## 2019-01-01 RX ADMIN — LORAZEPAM 0.5 MG: 2 INJECTION INTRAMUSCULAR; INTRAVENOUS at 12:54

## 2019-01-01 RX ADMIN — LIDOCAINE HYDROCHLORIDE 10 ML: 10 INJECTION, SOLUTION INFILTRATION; PERINEURAL at 13:50

## 2019-01-01 RX ADMIN — FUROSEMIDE 40 MG: 10 INJECTION, SOLUTION INTRAMUSCULAR; INTRAVENOUS at 00:53

## 2019-01-01 RX ADMIN — ONDANSETRON 4 MG: 2 INJECTION INTRAMUSCULAR; INTRAVENOUS at 10:52

## 2019-01-01 RX ADMIN — METOPROLOL SUCCINATE 50 MG: 50 TABLET, EXTENDED RELEASE ORAL at 08:26

## 2019-01-01 RX ADMIN — CARBOPLATIN 480 MG: 10 INJECTION, SOLUTION INTRAVENOUS at 13:59

## 2019-01-01 RX ADMIN — ETOPOSIDE 100 MG: 20 INJECTION, SOLUTION INTRAVENOUS at 11:59

## 2019-01-01 RX ADMIN — HEPARIN SODIUM 5000 UNITS: 5000 INJECTION, SOLUTION INTRAVENOUS; SUBCUTANEOUS at 21:22

## 2019-01-01 RX ADMIN — HYDROMORPHONE HYDROCHLORIDE 0.5 MG: 1 INJECTION, SOLUTION INTRAMUSCULAR; INTRAVENOUS; SUBCUTANEOUS at 17:36

## 2019-01-01 RX ADMIN — LORAZEPAM 0.5 MG: 2 INJECTION INTRAMUSCULAR; INTRAVENOUS at 16:58

## 2019-01-01 RX ADMIN — SODIUM CHLORIDE 155 MG: 9 INJECTION, SOLUTION INTRAVENOUS at 11:00

## 2019-01-01 RX ADMIN — GADOBENATE DIMEGLUMINE 15 ML: 529 INJECTION, SOLUTION INTRAVENOUS at 10:34

## 2019-01-01 RX ADMIN — POLYETHYLENE GLYCOL 3350 17 G: 17 POWDER, FOR SOLUTION ORAL at 20:34

## 2019-01-01 RX ADMIN — DEXAMETHASONE SODIUM PHOSPHATE 4 MG: 4 INJECTION, SOLUTION INTRA-ARTICULAR; INTRALESIONAL; INTRAMUSCULAR; INTRAVENOUS; SOFT TISSUE at 14:34

## 2019-01-01 RX ADMIN — Medication 250 MG: at 08:42

## 2019-01-01 RX ADMIN — FAMOTIDINE 20 MG: 10 INJECTION, SOLUTION INTRAVENOUS at 09:59

## 2019-01-01 RX ADMIN — DIPHENHYDRAMINE HYDROCHLORIDE 50 MG: 50 INJECTION INTRAMUSCULAR; INTRAVENOUS at 09:59

## 2019-01-01 RX ADMIN — SODIUM CHLORIDE, PRESERVATIVE FREE 3 ML: 5 INJECTION INTRAVENOUS at 08:45

## 2019-01-01 RX ADMIN — TAZOBACTAM SODIUM AND PIPERACILLIN SODIUM 4.5 G: 500; 4 INJECTION, SOLUTION INTRAVENOUS at 17:21

## 2019-01-01 RX ADMIN — IPRATROPIUM BROMIDE AND ALBUTEROL SULFATE 3 ML: 2.5; .5 SOLUTION RESPIRATORY (INHALATION) at 23:53

## 2019-01-01 RX ADMIN — GUAIFENESIN 600 MG: 600 TABLET, EXTENDED RELEASE ORAL at 09:37

## 2019-01-01 RX ADMIN — CEFTRIAXONE SODIUM 1 G: 1 INJECTION, SOLUTION INTRAVENOUS at 09:06

## 2019-01-01 RX ADMIN — ATEZOLIZUMAB 1200 MG: 1200 INJECTION, SOLUTION INTRAVENOUS at 10:22

## 2019-01-01 RX ADMIN — ACETAMINOPHEN 650 MG: 325 TABLET, FILM COATED ORAL at 16:14

## 2019-01-01 RX ADMIN — ONDANSETRON 16 MG: 2 INJECTION INTRAMUSCULAR; INTRAVENOUS at 09:25

## 2019-01-01 RX ADMIN — RAMIPRIL 5 MG: 5 CAPSULE ORAL at 09:05

## 2019-01-01 RX ADMIN — IPRATROPIUM BROMIDE AND ALBUTEROL SULFATE 3 ML: 2.5; .5 SOLUTION RESPIRATORY (INHALATION) at 16:06

## 2019-01-01 RX ADMIN — ETOPOSIDE 200 MG: 20 INJECTION, SOLUTION INTRAVENOUS at 12:22

## 2019-01-01 RX ADMIN — RAMIPRIL 5 MG: 5 CAPSULE ORAL at 08:14

## 2019-01-01 RX ADMIN — DOCUSATE SODIUM AND SENNOSIDES 2 TABLET: 50; 8.6 TABLET ORAL at 20:34

## 2019-01-01 RX ADMIN — ETOPOSIDE 100 MG: 20 INJECTION, SOLUTION INTRAVENOUS at 09:09

## 2019-01-01 RX ADMIN — IPRATROPIUM BROMIDE AND ALBUTEROL SULFATE 3 ML: 2.5; .5 SOLUTION RESPIRATORY (INHALATION) at 12:37

## 2019-01-01 RX ADMIN — DEXAMETHASONE SODIUM PHOSPHATE 12 MG: 4 INJECTION, SOLUTION INTRAMUSCULAR; INTRAVENOUS at 11:16

## 2019-01-01 RX ADMIN — LORAZEPAM 1 MG: 2 INJECTION INTRAMUSCULAR; INTRAVENOUS at 22:24

## 2019-01-01 RX ADMIN — IPRATROPIUM BROMIDE AND ALBUTEROL SULFATE 3 ML: 2.5; .5 SOLUTION RESPIRATORY (INHALATION) at 20:03

## 2019-01-01 RX ADMIN — METOPROLOL SUCCINATE 50 MG: 50 TABLET, EXTENDED RELEASE ORAL at 08:42

## 2019-01-01 RX ADMIN — DEXAMETHASONE SODIUM PHOSPHATE 12 MG: 4 INJECTION, SOLUTION INTRAMUSCULAR; INTRAVENOUS at 13:47

## 2019-01-01 RX ADMIN — RAMIPRIL 5 MG: 5 CAPSULE ORAL at 08:29

## 2019-01-01 RX ADMIN — GLYCOPYRROLATE 0.1 MG: 0.2 INJECTION, SOLUTION INTRAMUSCULAR; INTRAVENOUS at 09:23

## 2019-01-01 RX ADMIN — ATORVASTATIN CALCIUM 10 MG: 10 TABLET, FILM COATED ORAL at 20:34

## 2019-01-01 RX ADMIN — POLYETHYLENE GLYCOL 3350 17 G: 17 POWDER, FOR SOLUTION ORAL at 09:37

## 2019-01-01 RX ADMIN — TRAMADOL HYDROCHLORIDE 50 MG: 50 TABLET, COATED ORAL at 20:45

## 2019-01-01 RX ADMIN — Medication 250 MG: at 09:37

## 2019-01-01 RX ADMIN — FUROSEMIDE 20 MG: 10 INJECTION, SOLUTION INTRAMUSCULAR; INTRAVENOUS at 00:39

## 2019-01-01 RX ADMIN — IPRATROPIUM BROMIDE AND ALBUTEROL SULFATE 3 ML: 2.5; .5 SOLUTION RESPIRATORY (INHALATION) at 13:54

## 2019-01-01 RX ADMIN — SODIUM CHLORIDE, PRESERVATIVE FREE 10 ML: 5 INJECTION INTRAVENOUS at 20:17

## 2019-01-01 RX ADMIN — METOPROLOL SUCCINATE 50 MG: 50 TABLET, EXTENDED RELEASE ORAL at 08:31

## 2019-01-01 RX ADMIN — ETOPOSIDE 100 MG: 20 INJECTION, SOLUTION INTRAVENOUS at 08:30

## 2019-01-01 RX ADMIN — ATEZOLIZUMAB 1200 MG: 1200 INJECTION, SOLUTION INTRAVENOUS at 11:50

## 2019-01-01 RX ADMIN — IPRATROPIUM BROMIDE AND ALBUTEROL SULFATE 3 ML: 2.5; .5 SOLUTION RESPIRATORY (INHALATION) at 12:15

## 2019-01-01 RX ADMIN — FAMOTIDINE 20 MG: 10 INJECTION, SOLUTION INTRAVENOUS at 10:42

## 2019-01-01 RX ADMIN — ATEZOLIZUMAB 1200 MG: 1200 INJECTION, SOLUTION INTRAVENOUS at 10:29

## 2019-01-01 RX ADMIN — ASPIRIN 81 MG: 81 TABLET, COATED ORAL at 08:42

## 2019-01-01 RX ADMIN — CEFTRIAXONE SODIUM 1 G: 1 INJECTION, SOLUTION INTRAVENOUS at 10:56

## 2019-01-01 RX ADMIN — IOPAMIDOL 75 ML: 612 INJECTION, SOLUTION INTRAVENOUS at 10:42

## 2019-01-01 RX ADMIN — ONDANSETRON 16 MG: 2 INJECTION INTRAMUSCULAR; INTRAVENOUS at 10:03

## 2019-01-01 RX ADMIN — ETOPOSIDE 200 MG: 20 INJECTION, SOLUTION INTRAVENOUS at 15:22

## 2019-01-01 RX ADMIN — POLYETHYLENE GLYCOL 3350 17 G: 17 POWDER, FOR SOLUTION ORAL at 08:27

## 2019-01-01 RX ADMIN — ATEZOLIZUMAB 1200 MG: 1200 INJECTION, SOLUTION INTRAVENOUS at 11:46

## 2019-01-01 RX ADMIN — SODIUM CHLORIDE, PRESERVATIVE FREE 3 ML: 5 INJECTION INTRAVENOUS at 08:15

## 2019-01-01 RX ADMIN — METOPROLOL SUCCINATE 50 MG: 50 TABLET, EXTENDED RELEASE ORAL at 12:27

## 2019-01-01 RX ADMIN — HEPARIN SODIUM 5000 UNITS: 5000 INJECTION, SOLUTION INTRAVENOUS; SUBCUTANEOUS at 20:17

## 2019-01-01 RX ADMIN — IOPAMIDOL 100 ML: 612 INJECTION, SOLUTION INTRAVENOUS at 14:00

## 2019-01-01 RX ADMIN — LEVOFLOXACIN 750 MG: 5 INJECTION, SOLUTION INTRAVENOUS at 18:23

## 2019-01-01 RX ADMIN — Medication: at 14:01

## 2019-01-01 RX ADMIN — IPRATROPIUM BROMIDE AND ALBUTEROL SULFATE 3 ML: 2.5; .5 SOLUTION RESPIRATORY (INHALATION) at 19:44

## 2019-01-01 RX ADMIN — PROCHLORPERAZINE MALEATE 10 MG: 10 TABLET, FILM COATED ORAL at 08:50

## 2019-01-01 RX ADMIN — CEFTRIAXONE SODIUM 1 G: 1 INJECTION, SOLUTION INTRAVENOUS at 08:31

## 2019-01-01 RX ADMIN — DOXYCYCLINE 100 MG: 100 INJECTION, POWDER, LYOPHILIZED, FOR SOLUTION INTRAVENOUS at 01:07

## 2019-01-01 RX ADMIN — ATEZOLIZUMAB 1200 MG: 1200 INJECTION, SOLUTION INTRAVENOUS at 09:47

## 2019-01-01 RX ADMIN — SODIUM CHLORIDE, PRESERVATIVE FREE 10 ML: 5 INJECTION INTRAVENOUS at 13:01

## 2019-01-01 RX ADMIN — MORPHINE SULFATE 2 MG: 2 INJECTION, SOLUTION INTRAMUSCULAR; INTRAVENOUS at 12:48

## 2019-01-01 RX ADMIN — SODIUM CHLORIDE 250 ML: 9 INJECTION, SOLUTION INTRAVENOUS at 11:25

## 2019-01-01 RX ADMIN — SODIUM CHLORIDE, PRESERVATIVE FREE 10 ML: 5 INJECTION INTRAVENOUS at 21:43

## 2019-01-01 RX ADMIN — LORAZEPAM 0.5 MG: 2 INJECTION INTRAMUSCULAR; INTRAVENOUS at 02:51

## 2019-01-01 RX ADMIN — RAMIPRIL 5 MG: 5 CAPSULE ORAL at 12:27

## 2019-01-01 RX ADMIN — TRAMADOL HYDROCHLORIDE 50 MG: 50 TABLET, COATED ORAL at 21:38

## 2019-01-01 RX ADMIN — SODIUM CHLORIDE 150 MG: 9 INJECTION, SOLUTION INTRAVENOUS at 10:06

## 2019-01-01 RX ADMIN — HALOPERIDOL LACTATE 1 MG: 5 INJECTION, SOLUTION INTRAMUSCULAR at 23:37

## 2019-01-01 RX ADMIN — SODIUM CHLORIDE 250 ML: 9 INJECTION, SOLUTION INTRAVENOUS at 15:16

## 2019-01-01 RX ADMIN — GUAIFENESIN 600 MG: 600 TABLET, EXTENDED RELEASE ORAL at 08:42

## 2019-01-01 RX ADMIN — IPRATROPIUM BROMIDE 0.5 MG: 0.5 SOLUTION RESPIRATORY (INHALATION) at 17:28

## 2019-01-01 RX ADMIN — TAZOBACTAM SODIUM AND PIPERACILLIN SODIUM 3.38 G: 375; 3 INJECTION, SOLUTION INTRAVENOUS at 23:44

## 2019-01-01 RX ADMIN — HYDROMORPHONE HYDROCHLORIDE 1 MG: 1 INJECTION, SOLUTION INTRAMUSCULAR; INTRAVENOUS; SUBCUTANEOUS at 13:59

## 2019-01-01 RX ADMIN — DOXYCYCLINE 100 MG: 100 INJECTION, POWDER, LYOPHILIZED, FOR SOLUTION INTRAVENOUS at 00:15

## 2019-01-01 RX ADMIN — SODIUM CHLORIDE, PRESERVATIVE FREE 3 ML: 5 INJECTION INTRAVENOUS at 22:02

## 2019-01-01 RX ADMIN — METOPROLOL SUCCINATE 50 MG: 50 TABLET, EXTENDED RELEASE ORAL at 09:37

## 2019-01-01 RX ADMIN — HEPARIN SODIUM 5000 UNITS: 5000 INJECTION, SOLUTION INTRAVENOUS; SUBCUTANEOUS at 05:38

## 2019-01-01 RX ADMIN — LORAZEPAM 0.5 MG: 2 INJECTION INTRAMUSCULAR; INTRAVENOUS at 08:49

## 2019-01-01 RX ADMIN — HALOPERIDOL LACTATE 1 MG: 5 INJECTION, SOLUTION INTRAMUSCULAR at 17:52

## 2019-01-01 RX ADMIN — HYDROMORPHONE HYDROCHLORIDE 0.5 MG: 1 INJECTION, SOLUTION INTRAMUSCULAR; INTRAVENOUS; SUBCUTANEOUS at 11:58

## 2019-01-01 RX ADMIN — IPRATROPIUM BROMIDE AND ALBUTEROL SULFATE 3 ML: 2.5; .5 SOLUTION RESPIRATORY (INHALATION) at 16:30

## 2019-01-01 RX ADMIN — HYDROMORPHONE HYDROCHLORIDE 0.5 MG: 1 INJECTION, SOLUTION INTRAMUSCULAR; INTRAVENOUS; SUBCUTANEOUS at 16:59

## 2019-01-01 RX ADMIN — HALOPERIDOL LACTATE 1 MG: 5 INJECTION, SOLUTION INTRAMUSCULAR at 23:54

## 2019-01-01 RX ADMIN — CEFTRIAXONE SODIUM 1 G: 1 INJECTION, SOLUTION INTRAVENOUS at 08:43

## 2019-01-01 RX ADMIN — ATORVASTATIN CALCIUM 10 MG: 10 TABLET, FILM COATED ORAL at 20:22

## 2019-01-01 RX ADMIN — HEPARIN 500 UNITS: 100 SYRINGE at 10:19

## 2019-01-01 RX ADMIN — DOCUSATE SODIUM AND SENNOSIDES 2 TABLET: 50; 8.6 TABLET ORAL at 21:54

## 2019-01-01 RX ADMIN — HEPARIN 500 UNITS: 100 SYRINGE at 11:25

## 2019-01-01 RX ADMIN — POLYETHYLENE GLYCOL 3350 17 G: 17 POWDER, FOR SOLUTION ORAL at 08:14

## 2019-01-01 RX ADMIN — SODIUM CHLORIDE, PRESERVATIVE FREE 3 ML: 5 INJECTION INTRAVENOUS at 09:52

## 2019-01-01 RX ADMIN — SODIUM CHLORIDE 250 ML: 9 INJECTION, SOLUTION INTRAVENOUS at 12:00

## 2019-01-01 RX ADMIN — SODIUM CHLORIDE 150 MG: 9 INJECTION, SOLUTION INTRAVENOUS at 10:52

## 2019-01-01 RX ADMIN — SODIUM CHLORIDE, PRESERVATIVE FREE 3 ML: 5 INJECTION INTRAVENOUS at 22:10

## 2019-01-01 RX ADMIN — HALOPERIDOL LACTATE 1 MG: 5 INJECTION, SOLUTION INTRAMUSCULAR at 17:43

## 2019-01-01 RX ADMIN — CEFTRIAXONE SODIUM 1 G: 1 INJECTION, SOLUTION INTRAVENOUS at 08:29

## 2019-01-01 RX ADMIN — IOPAMIDOL 100 ML: 755 INJECTION, SOLUTION INTRAVENOUS at 13:58

## 2019-01-01 RX ADMIN — SODIUM CHLORIDE 250 ML: 9 INJECTION, SOLUTION INTRAVENOUS at 11:56

## 2019-01-01 RX ADMIN — BARIUM SULFATE 450 ML: 21 SUSPENSION ORAL at 14:00

## 2019-01-01 RX ADMIN — RAMIPRIL 5 MG: 5 CAPSULE ORAL at 08:31

## 2019-01-01 RX ADMIN — HYDROMORPHONE HYDROCHLORIDE 0.5 MG: 1 INJECTION, SOLUTION INTRAMUSCULAR; INTRAVENOUS; SUBCUTANEOUS at 12:00

## 2019-01-01 RX ADMIN — SODIUM CHLORIDE, PRESERVATIVE FREE 3 ML: 5 INJECTION INTRAVENOUS at 20:35

## 2019-01-01 RX ADMIN — ONDANSETRON 4 MG: 2 INJECTION INTRAMUSCULAR; INTRAVENOUS at 23:47

## 2019-01-01 RX ADMIN — METOPROLOL SUCCINATE 50 MG: 50 TABLET, EXTENDED RELEASE ORAL at 09:05

## 2019-01-01 RX ADMIN — ACETAMINOPHEN 650 MG: 650 SUPPOSITORY RECTAL at 05:23

## 2019-01-01 RX ADMIN — SODIUM CHLORIDE, PRESERVATIVE FREE 3 ML: 5 INJECTION INTRAVENOUS at 08:33

## 2019-01-01 RX ADMIN — IPRATROPIUM BROMIDE AND ALBUTEROL SULFATE 3 ML: 2.5; .5 SOLUTION RESPIRATORY (INHALATION) at 21:20

## 2019-01-01 RX ADMIN — DEXAMETHASONE SODIUM PHOSPHATE 12 MG: 4 INJECTION, SOLUTION INTRAMUSCULAR; INTRAVENOUS at 10:42

## 2019-01-01 RX ADMIN — IPRATROPIUM BROMIDE AND ALBUTEROL SULFATE 3 ML: 2.5; .5 SOLUTION RESPIRATORY (INHALATION) at 08:39

## 2019-01-01 RX ADMIN — HYDROMORPHONE HYDROCHLORIDE 1 MG: 1 INJECTION, SOLUTION INTRAMUSCULAR; INTRAVENOUS; SUBCUTANEOUS at 07:40

## 2019-01-01 RX ADMIN — SODIUM CHLORIDE 250 ML: 9 INJECTION, SOLUTION INTRAVENOUS at 10:42

## 2019-01-01 RX ADMIN — ONDANSETRON 16 MG: 2 INJECTION INTRAMUSCULAR; INTRAVENOUS at 11:56

## 2019-01-01 RX ADMIN — ONDANSETRON 16 MG: 2 INJECTION INTRAMUSCULAR; INTRAVENOUS at 11:25

## 2019-01-01 RX ADMIN — HEPARIN 500 UNITS: 100 SYRINGE at 14:49

## 2019-01-01 RX ADMIN — DOXYCYCLINE 100 MG: 100 INJECTION, POWDER, LYOPHILIZED, FOR SOLUTION INTRAVENOUS at 12:53

## 2019-01-01 RX ADMIN — ETOPOSIDE 160 MG: 20 INJECTION, SOLUTION, CONCENTRATE INTRAVENOUS at 13:59

## 2019-01-01 RX ADMIN — ATEZOLIZUMAB 1200 MG: 1200 INJECTION, SOLUTION INTRAVENOUS at 12:18

## 2019-01-01 RX ADMIN — HALOPERIDOL LACTATE 0.5 MG: 5 INJECTION INTRAMUSCULAR at 14:08

## 2019-01-01 RX ADMIN — HEPARIN SODIUM 5000 UNITS: 5000 INJECTION, SOLUTION INTRAVENOUS; SUBCUTANEOUS at 15:07

## 2019-01-01 RX ADMIN — POLYETHYLENE GLYCOL 3350 17 G: 17 POWDER, FOR SOLUTION ORAL at 20:17

## 2019-01-01 RX ADMIN — POLYETHYLENE GLYCOL 3350 17 G: 17 POWDER, FOR SOLUTION ORAL at 08:29

## 2019-01-01 RX ADMIN — HEPARIN 500 UNITS: 100 SYRINGE at 12:20

## 2019-01-01 RX ADMIN — ETOPOSIDE 100 MG: 20 INJECTION, SOLUTION INTRAVENOUS at 09:08

## 2019-01-01 RX ADMIN — LORAZEPAM 0.5 MG: 2 INJECTION INTRAMUSCULAR; INTRAVENOUS at 20:33

## 2019-01-01 RX ADMIN — ATORVASTATIN CALCIUM 10 MG: 10 TABLET, FILM COATED ORAL at 20:04

## 2019-01-01 RX ADMIN — GUAIFENESIN 600 MG: 600 TABLET, EXTENDED RELEASE ORAL at 20:17

## 2019-01-01 RX ADMIN — TRAMADOL HYDROCHLORIDE 50 MG: 50 TABLET, COATED ORAL at 20:23

## 2019-01-01 RX ADMIN — SODIUM CHLORIDE 250 ML: 9 INJECTION, SOLUTION INTRAVENOUS at 09:59

## 2019-01-01 RX ADMIN — IPRATROPIUM BROMIDE AND ALBUTEROL SULFATE 3 ML: 2.5; .5 SOLUTION RESPIRATORY (INHALATION) at 13:46

## 2019-02-16 NOTE — PROGRESS NOTES
Subjective   Darron Martino is a 86 y.o. male    Chief Complaint    Upper back pain  Arthritis  Hypertension  Hyperlipidemia    History of Present Illness  Patient presents today for periodic follow-up regarding the above-noted complaints and chronic problems.  He is due for lab studies and medication refills.  He is been having increased pain in his joints in general particularly in his upper back.  Thoracic spine x-rays that were done in 2015 revealed multiple osteophytes which are likely gotten larger and may be the source of his discomfort.  He is certainly due to have these x-rays repeated.  He is agreeable to do this.  He does not have neurologic features associated with this pain.    The following portions of the patient's history were reviewed and updated as appropriate: allergies, current medications, past social history and problem list    Review of Systems   Constitutional: Negative.  Negative for chills, fatigue, fever and unexpected weight change.   HENT: Negative for trouble swallowing and voice change.    Eyes: Negative.    Respiratory: Negative.  Negative for cough, chest tightness, shortness of breath and wheezing.    Cardiovascular: Negative for chest pain, palpitations and leg swelling.   Gastrointestinal: Negative.  Negative for abdominal pain, nausea and vomiting.   Endocrine: Negative for polydipsia, polyphagia and polyuria.   Genitourinary: Negative.  Negative for dysuria, frequency and urgency.   Musculoskeletal: Positive for arthralgias, back pain and neck pain. Negative for gait problem and myalgias.   Skin: Negative for color change and rash.   Neurological: Negative for dizziness, tremors, syncope, weakness, numbness and headaches.   Hematological: Negative for adenopathy. Does not bruise/bleed easily.   Psychiatric/Behavioral: Negative.        Objective     Vitals:    02/14/19 1504   BP: 180/88   Pulse:    Temp:    SpO2:        Physical Exam   Constitutional: He is oriented to person,  place, and time. He appears well-developed and well-nourished.   HENT:   Head: Normocephalic.   Mouth/Throat: Oropharynx is clear and moist.   Eyes: Conjunctivae are normal. Pupils are equal, round, and reactive to light.   Neck: Neck supple. No JVD present. No thyromegaly present.   Cardiovascular: Normal rate, regular rhythm, normal heart sounds, intact distal pulses and normal pulses.   No murmur heard.  Pulmonary/Chest: Effort normal and breath sounds normal. No respiratory distress.   Abdominal: Soft. Bowel sounds are normal. There is no hepatosplenomegaly. There is no tenderness.   Musculoskeletal: He exhibits no edema.        Lumbar back: He exhibits decreased range of motion, tenderness, bony tenderness and pain. He exhibits no swelling, no deformity and no spasm.   Lymphadenopathy:     He has no cervical adenopathy.   Neurological: He is alert and oriented to person, place, and time. He has normal reflexes.   Skin: Skin is warm and dry. No rash noted.   Psychiatric: He has a normal mood and affect. His behavior is normal.   Nursing note and vitals reviewed.      Assessment/Plan   Problem List Items Addressed This Visit        Cardiovascular and Mediastinum    Hypertension    Relevant Orders    Comprehensive Metabolic Panel    Arteriosclerosis of coronary artery    Relevant Medications    nitroglycerin (NITROSTAT) 0.4 MG SL tablet    clopidogrel (PLAVIX) 75 MG tablet    Other Relevant Orders    Comprehensive Metabolic Panel    Lipid Panel       Musculoskeletal and Integument    Generalized osteoarthrosis, involving multiple sites    Relevant Orders    CBC (No Diff)    Comprehensive Metabolic Panel      Other Visit Diagnoses     Upper back pain on right side    -  Primary    Relevant Orders    XR Spine Thoracic 3 View (Completed)    Hyperlipidemia, unspecified hyperlipidemia type        Relevant Medications    pravastatin (PRAVACHOL) 80 MG tablet    Other Relevant Orders    Comprehensive Metabolic Panel     Lipid Panel    Medication monitoring encounter        Relevant Orders    CBC (No Diff)    Comprehensive Metabolic Panel

## 2019-04-04 NOTE — PROGRESS NOTES
Subjective   Darron Martino is a 86 y.o. male    Chief Complaint    Hypertension  Coronary artery disease  Shortness of breath  Thoracic back pain    History of Present Illness  Patient presents today for follow-up visit regarding the above issues.  He saw his cardiologist in February and was placed on Norvasc but had a second reaction so the medication was stopped and he was put on Aldactazide which has produced a significant diuresis and also improved his breathing.  He appears to be tolerating the medication fairly well.  His systolic blood pressure is still elevated somewhat today.  He is also been having some chest tightness that goes up into his neck.  An office EKG is done and reveals some nonspecific T wave changes but no acute abnormal pattern otherwise.  Patient continues to have pain involving his thoracic spine particularly.  X-ray studies revealed degenerative disc changes.  He reports the pain often keeps him up at night.  He does not like to take NSAIDs and refuses to use any opioid.  We discussed trial of tramadol which he is agreeable to try.  I have asked him just to take one at bedtime to see if he can at least get some pain relief so he can sleep.  I think he will feel better overall.    The following portions of the patient's history were reviewed and updated as appropriate: allergies, current medications, past social history and problem list    Review of Systems   Constitutional: Negative.  Negative for fatigue and unexpected weight change.   HENT: Positive for postnasal drip. Negative for trouble swallowing.    Respiratory: Negative.  Negative for cough, chest tightness and shortness of breath.    Cardiovascular: Negative for chest pain, palpitations and leg swelling.   Gastrointestinal: Negative.  Negative for nausea.   Genitourinary: Negative.    Musculoskeletal: Positive for back pain. Negative for arthralgias, gait problem and myalgias.   Skin: Negative for color change and rash.    Neurological: Negative for dizziness, tremors, syncope, weakness, numbness and headaches.   Hematological: Negative for adenopathy. Does not bruise/bleed easily.   Psychiatric/Behavioral: Negative for dysphoric mood. The patient is not nervous/anxious.        Objective     Vitals:    04/04/19 1515   BP: 152/80   Pulse: 86   Temp: 98.2 °F (36.8 °C)   SpO2: 97%     Physical Exam   Constitutional: He is oriented to person, place, and time. He appears well-developed and well-nourished.   HENT:   Head: Normocephalic and atraumatic.   Neck: No JVD present.   Cardiovascular: Normal rate, regular rhythm, normal heart sounds, intact distal pulses and normal pulses.   No murmur heard.  Pulmonary/Chest: Effort normal and breath sounds normal. No respiratory distress.   Abdominal: Soft. Bowel sounds are normal. There is no hepatosplenomegaly. There is no tenderness.   Musculoskeletal: He exhibits no edema.        Lumbar back: He exhibits decreased range of motion, tenderness, bony tenderness and pain. He exhibits no swelling, no deformity and no spasm.   Neurological: He is alert and oriented to person, place, and time. He has normal reflexes.   Skin: Skin is warm and dry.   Psychiatric: He has a normal mood and affect. His behavior is normal.   Nursing note and vitals reviewed.      ECG 12 Lead  Date/Time: 4/4/2019 3:32 PM  Performed by: Duong Greene MD  Authorized by: Duong Greene MD   Comparison: not compared with previous ECG   Rhythm: sinus rhythm  Rate: normal  Conduction: conduction normal  ST Segments: ST segments normal  T Waves: T waves normal  QRS axis: normal  Other findings: non-specific ST-T wave changes    Clinical impression: non-specific ECG          Assessment/Plan   Problem List Items Addressed This Visit        Cardiovascular and Mediastinum    Hypertension    Relevant Medications    spironolactone-hydrochlorothiazide (ALDACTAZIDE) 25-25 MG tablet    Arteriosclerosis of  coronary artery - Primary      Other Visit Diagnoses     Degenerative disc disease, thoracic        Relevant Medications    traMADol (ULTRAM) 50 MG tablet    Shortness of breath        Relevant Medications    spironolactone-hydrochlorothiazide (ALDACTAZIDE) 25-25 MG tablet

## 2019-04-11 PROBLEM — J90 PLEURAL EFFUSION: Status: ACTIVE | Noted: 2019-01-01

## 2019-04-11 NOTE — PLAN OF CARE
Problem: Breathing Pattern Ineffective (Adult)  Goal: Identify Related Risk Factors and Signs and Symptoms  Outcome: Ongoing (interventions implemented as appropriate)      Problem: Pneumonia (Adult)  Goal: Signs and Symptoms of Listed Potential Problems Will be Absent, Minimized or Managed (Pneumonia)  Outcome: Ongoing (interventions implemented as appropriate)      Problem: Patient Care Overview  Goal: Plan of Care Review  Outcome: Ongoing (interventions implemented as appropriate)   04/11/19 6889   Coping/Psychosocial   Plan of Care Reviewed With patient;spouse   Coping/Psychosocial   Patient Agreement with Plan of Care agrees   Plan of Care Review   Progress improving   OTHER   Outcome Summary CT guided ct placed today 860cc immediately. CT to 20cm suction. No c/o pain. Placed on O2 for pt comfort (sats were good on RA)

## 2019-04-11 NOTE — ED PROVIDER NOTES
Subjective   86-year-old white male complaining of shortness of air for 1 week.  Patient states for the past week he has had progressively worsening shortness of breath.  This morning, he woke up with worsening shortness of breath.  He denies any chest pain but feels like at times he has some pressure in the epigastric region.  Patient has a history of CAD status post bypass grafting in the 90s.  Patient attributes most of his symptoms to a recent antihypertensive medication change.  Patient denies fever, vomiting or other complaints.        History provided by:  Patient  Shortness of Breath   Severity:  Moderate  Onset quality:  Gradual  Timing:  Constant  Chronicity:  New  Relieved by:  Nothing  Worsened by:  Activity  Ineffective treatments:  None tried  Associated symptoms: cough    Associated symptoms: no abdominal pain, no chest pain, no fever and no sputum production        Review of Systems   Constitutional: Negative for fever.   Respiratory: Positive for cough and shortness of breath. Negative for sputum production.    Cardiovascular: Negative for chest pain.   Gastrointestinal: Negative for abdominal pain.   All other systems reviewed and are negative.      Past Medical History:   Diagnosis Date   • Abnormal glucose    • Acquired hyperlipoproteinemia    • Acute bronchitis    • Allergic rhinitis    • Arteriosclerosis of coronary artery    • Cellulitis and abscess    • Cough    • Edema extremities    • Elevated PSA    • Encounter for prostate cancer screening    • Generalized osteoarthrosis, involving multiple sites    • Hypertension    • Lumbosacral spondylosis without myelopathy    • Pain in joint, pelvic region and thigh    • Preventative health care    • Right groin pain    • Transient cerebral ischemia    • Ulnar neuropathy    • Upper back pain on right side    • UTI (urinary tract infection)    • Varicose veins        Allergies   Allergen Reactions   • Hydrocodone Unknown (See Comments)     Originally  "listed as \"other\" with hydrocodone/apap in the comment field.    • Norvasc [Amlodipine Besylate]        Past Surgical History:   Procedure Laterality Date   • CARDIAC SURGERY     • INGUINAL HERNIA REPAIR     • LEG SURGERY         Family History   Problem Relation Age of Onset   • Hypertension Mother    • Arthritis Father        Social History     Socioeconomic History   • Marital status:      Spouse name: Not on file   • Number of children: Not on file   • Years of education: Not on file   • Highest education level: Not on file   Tobacco Use   • Smoking status: Former Smoker   • Smokeless tobacco: Never Used   Substance and Sexual Activity   • Alcohol use: Yes     Comment: occasional   • Drug use: No   • Sexual activity: No           Objective   Physical Exam   Constitutional: He is oriented to person, place, and time. He appears well-developed and well-nourished. No distress.   HENT:   Head: Normocephalic and atraumatic.   Mouth/Throat: Oropharynx is clear and moist.   No stridor airway intact   Eyes: EOM are normal.   Neck: Normal range of motion. Neck supple.   Cardiovascular: Normal rate and normal heart sounds.   No murmur heard.  Pulmonary/Chest: He is in respiratory distress. He has decreased breath sounds in the right upper field, the right middle field and the right lower field. He has no wheezes. He has no rhonchi. He has no rales.   Tachypnea rate 24/min   Abdominal: Soft. Bowel sounds are normal. There is no tenderness.   Musculoskeletal: Normal range of motion.        Right lower leg: Normal. He exhibits no edema.   Neurological: He is alert and oriented to person, place, and time.   Skin: Skin is warm and dry. Capillary refill takes less than 2 seconds. No rash noted.   Psychiatric: He has a normal mood and affect. His behavior is normal.   Nursing note and vitals reviewed.      Critical Care  Performed by: Dontrell Cantu PA  Authorized by: Layo Ventura MD     Critical care provider " statement:     Critical care time (minutes):  35    Critical care was necessary to treat or prevent imminent or life-threatening deterioration of the following conditions: Respiratory distress.    Critical care was time spent personally by me on the following activities:  Obtaining history from patient or surrogate, examination of patient, evaluation of patient's response to treatment, discussions with consultants, development of treatment plan with patient or surrogate, pulse oximetry, review of old charts, re-evaluation of patient's condition, ordering and review of radiographic studies, ordering and review of laboratory studies and ordering and performing treatments and interventions               ED Course  ED Course as of Apr 11 1628   u Apr 11, 2019   1007 I spoke with Dr. Coronado and Dr. Julien regarding the patient's x-ray findings.  Patient is a candidate for CT-guided pleurocentesis.  Dr. Julien would like a CT ordered first to give a clear definition to this fluid collection.  I spoke with Dr. Gonsalves regarding this patient.  She agreed with initiating antibiotics and proceeding with a telemetry admission.  [JI]   1008 I reevaluated the patient who was tachypneic but an otherwise no additional respiratory or declining distress.  [JI]      ED Course User Index  [JI] Dontrell Cantu PA                  [facility]      Final diagnoses:   Pleural effusion   Respiratory distress   Pneumonia of right lung due to infectious organism, unspecified part of lung            Dontrell Cantu PA  04/11/19 6330

## 2019-04-11 NOTE — PROGRESS NOTES
Discharge Planning Assessment  [facility]     Patient Name: Darron Martino  MRN: [id number]  Today's Date: 4/11/2019    Admit Date: 4/11/2019    Discharge Needs Assessment     Row Name 04/11/19 1030       Living Environment    Lives With  spouse pt resides in [facility]    Name(s) of Who Lives With Patient  Wife- Yamilet    Current Living Arrangements  home/apartment/condo    Primary Care Provided by  self    Provides Primary Care For  no one    Family Caregiver if Needed  spouse    Family Caregiver Names  Yamilet    Quality of Family Relationships  helpful;involved;supportive    Able to Return to Prior Arrangements  yes       Resource/Environmental Concerns    Resource/Environmental Concerns  none       Transition Planning    Patient/Family Anticipates Transition to  home with family    Patient/Family Anticipated Services at Transition  none       Discharge Needs Assessment    Readmission Within the Last 30 Days  no previous admission in last 30 days    Concerns to be Addressed  discharge planning    Equipment Currently Used at Home  none    Anticipated Changes Related to Illness  none    Equipment Needed After Discharge  none        Discharge Plan     Row Name 04/11/19 1031       Plan    Plan  home    Patient/Family in Agreement with Plan  yes    Plan Comments  CM spoke with pt and wife Yamilet at bedside. Pt resides in [facility] and was independent of adl's prior to admission. Pt does not use DME or have home health or outpt services. Pt plans to return home and denies needs at this time. CM will continue to follow.     Final Discharge Disposition Code  01 - home or self-care        Destination      No service coordination in this encounter.      Durable Medical Equipment      No service coordination in this encounter.      Dialysis/Infusion      No service coordination in this encounter.      Home Medical Care      No service coordination in this encounter.      Therapy      No service coordination  in this encounter.      Community Resources      No service coordination in this encounter.          Demographic Summary     Row Name 04/11/19 1029       General Information    Admission Type  observation    Required Notices Provided  Observation Status Notice    Referral Source  admission list    Reason for Consult  discharge planning    Preferred Language  English    General Information Comments  PCP- Bebo Norman       Contact Information    Permission Granted to Share Info With      Contact Information Obtained for      Contact Information Comments  [phone]        Functional Status     Row Name 04/11/19 1029       Functional Status    Usual Activity Tolerance  moderate    Current Activity Tolerance  fair       Functional Status, IADL    Medications  independent    Meal Preparation  independent    Housekeeping  independent    Laundry  independent    Shopping  independent       Employment/    Employment/ Comments  pt confrims he has Medicare, AARP and  for life insurance, denies concerens or disruption in coverage. Pt confirms he has prescription drug coverage through [employer] and denies issues obtbaining or affording current medications.         Psychosocial    No documentation.       Abuse/Neglect    No documentation.       Legal    No documentation.       Substance Abuse    No documentation.       Patient Forms    No documentation.           Shayla Orona

## 2019-04-11 NOTE — POST-PROCEDURE NOTE
Radiology Procedure    Pre-procedure: Informed written consent was obtained.  Mr. Martino understands the procedure and agrees to CT guided right thoracentesis and pleural drain placement.                 Procedure Performed: CT guided thoracentesis.  8.5 Tongan straight multi-sidehole catheter left in place.        IV Sedation and/or Anesthesia:  No    Complications: none    Preliminary Findings: 860 cc thin cloudy reddish orange pleural fluid removed.  Sent for labs ordered.     Specimen Removed: As above    Estimated Blood Loss:  0ml    Post-Procedure Diagnosis: Right pleural effusion.    Post-Procedure Plan: He feels much better.  No c/o.  Hospitalist will manage drain from this point.      Standard Discharge Instructions Given:no     Bret Julien MD  04/11/19  2:42 PM

## 2019-04-11 NOTE — H&P
[facility] Medicine Services  HISTORY AND PHYSICAL    Patient Name: Darron Martino  : 7/3/1932  MRN: [id number]  Primary Care Physician: Duong Greene MD  Date of admission: 2019      Subjective   Subjective     Chief Complaint:  Shortness of breath     HPI:  Darron Martino is a 86 y.o. male with history of CAD s/p CABG () and HTN who presents with a 1 week history of progressive shortness of breath.  The shortness of breath is worse when he stands up. He has been unable to lay on his right sided due to worsening shortness of breath. He was seen by his PCP 1 week ago who felt that it was related to his heart. He reports intermittent LE edema but none recently. He also had some pain around his diaphragm. He has also been having low grade fevers up to 100.3 and dry cough.     In the ED, he was noted to be in respiratory distress with tachypnea. CXR showed large right sided effusion. ED discussed with IR who felt that IR guided thoracentesis was appropriate and patient was admitted for thoracentesis. There was also concern for CAP and he was started on doxy and rocephin.     Review of Systems   Constitutional: Positive for fever. Negative for chills.   HENT: Negative for trouble swallowing.    Eyes: Negative for photophobia.   Respiratory: Positive for cough and shortness of breath.    Cardiovascular: Negative for chest pain and leg swelling.   Gastrointestinal: Positive for abdominal pain. Negative for diarrhea and nausea.   Endocrine: Negative for polyuria.   Genitourinary: Negative for dysuria.   Musculoskeletal: Negative for gait problem.   Skin: Negative for rash.   Neurological: Negative for numbness and headaches.          Otherwise complete ROS reviewed and is negative except as mentioned in the HPI.    Personal History     Past Medical History:   Diagnosis Date   • Abnormal glucose    • Acquired hyperlipoproteinemia    • Acute bronchitis    • Allergic  rhinitis    • Arteriosclerosis of coronary artery    • Cellulitis and abscess    • Cough    • Edema extremities    • Elevated PSA    • Encounter for prostate cancer screening    • Generalized osteoarthrosis, involving multiple sites    • Hypertension    • Lumbosacral spondylosis without myelopathy    • Pain in joint, pelvic region and thigh    • Preventative health care    • Right groin pain    • Transient cerebral ischemia    • Ulnar neuropathy    • Upper back pain on right side    • UTI (urinary tract infection)    • Varicose veins        Past Surgical History:   Procedure Laterality Date   • CARDIAC SURGERY     • INGUINAL HERNIA REPAIR     • LEG SURGERY         Family History: family history includes Arthritis in his father; Hypertension in his mother. Brother with CAD. Otherwise pertinent FHx was reviewed and unremarkable.     Social History:  reports that he has quit smoking. He has never used smokeless tobacco. He reports that he drinks alcohol. He reports that he does not use drugs.  Social History     Social History Narrative   • Not on file       Medications:    Available home medication information reviewed.    (Not in a hospital admission)    Allergies   Allergen Reactions   • Norvasc [Amlodipine Besylate]    • Other      Hydrocodone-acetaminophen       Objective   Objective     Vital Signs:   Temp:  [97.6 °F (36.4 °C)] 97.6 °F (36.4 °C)  Heart Rate:  [89-99] 89  Resp:  [18] 18  BP: (108-147)/(77-94) 108/80        Physical Exam   Constitutional: Awake, alert. Sitting up in bed. No acute distress  Eyes: PERRLA, sclerae anicteric, no conjunctival injection  HENT: NCAT, mucous membranes moist  Neck: Supple, no thyromegaly, no lymphadenopathy, trachea midline  Respiratory: Decreased BS in right lung base otherwise. On 3L of oxygen but no respiratory distress. Chest tube in place.    Cardiovascular: RRR, no murmurs, rubs, or gallops, palpable pedal pulses bilaterally  Gastrointestinal: Positive bowel sounds,  soft, nontender, nondistended  Musculoskeletal: No bilateral ankle edema, no clubbing or cyanosis to extremities  Psychiatric: Appropriate affect, cooperative  Neurologic: Oriented x 3, strength symmetric in all extremities, Cranial Nerves grossly intact to confrontation, speech clear  Skin: No rashes      Results Reviewed:  I have personally reviewed current lab, radiology, and data and agree.    Results from last 7 days   Lab Units 04/11/19  0838   WBC 10*3/mm3 19.66*   HEMOGLOBIN g/dL 16.2   HEMATOCRIT % 47.3   PLATELETS 10*3/mm3 344     Results from last 7 days   Lab Units 04/11/19  0841 04/11/19  0838   SODIUM mmol/L  --  134*   POTASSIUM mmol/L  --  4.7   CHLORIDE mmol/L  --  94*   CO2 mmol/L  --  26.0   BUN mg/dL  --  29*   CREATININE mg/dL  --  1.13   GLUCOSE mg/dL  --  109*   CALCIUM mg/dL  --  10.1   ALT (SGPT) U/L  --  28   AST (SGOT) U/L  --  40   TROPONIN I ng/mL 0.00  --      Estimated Creatinine Clearance: 50.8 mL/min (by C-G formula based on SCr of 1.13 mg/dL).  Brief Urine Lab Results     None        No results found for: BNP  Imaging Results (last 24 hours)     Procedure Component Value Units Date/Time    XR Chest 1 View [[id number]] Collected:  04/11/19 0950     Updated:  04/11/19 0950    Narrative:       EXAMINATION: XR CHEST 1 VW- 04/11/2019      INDICATION: SOA triage protocol      COMPARISON: 01/05/2012     FINDINGS: Near-total opacification of the right hemithorax with meniscal  sign present of likely moderate to large pleural effusion component with  adjacent atelectasis versus airspace disease. No mediastinal shift.  Status post median sternotomy and CABG. Left hemithorax grossly clear.            Impression:       Near-total opacification of the right hemithorax from likely  large effusion component with adjacent opacifications of atelectasis  versus airspace disease without mediastinal shift.     D:  04/11/2019  E:  04/11/2019              Results for orders placed in visit on 04/11/17    SCANNED - ECHOCARDIOGRAM       Assessment/Plan   Assessment / Plan     Active Hospital Problems    Diagnosis POA   • Pleural effusion [J90] Yes   • Hypertension [I10] Yes   • Arteriosclerosis of coronary artery [I25.10] Yes     85yo M with history of HTN and CAD presents with shortness of breath. Noted to have large right sided pleural effusion and concern for pneumonia.       Right sided pleural effusion:  - Patient with significant right sided pleural effusion on presentation. Likely cause of the patients respiratory symptoms. Mildly tachypnic on presentation.   - ED provider has discussed with Dr. Julien with radiology. Recommend CT chest with out contrast which is pending and then Thoracentesis.   - IR guided thoracentesis ordered with cell count, LDH and cultures. However, per IR patient required CT guided chest tube due to plavix, this was ordered and placed this afternoon with removal of 860cc of cloudy, reddish orange pleural fluid.   - serum LDH ordered.   - Repeat CXR in the am. Pulm consult in the am for chest tube management.     Addendum:  - CT chest with atelectatic right lung and decrease patency of right LL bronchus. Opacifications in atelectatic right lower lobe may represent airspace disease versus underlying mass lesion which should be followed.  - Cytology added on to pleural fluid labs.   - Pulm consult placed for tomorrow.     CAP:  - Concern for CAP on admission, effusion likely obscuring views. History of cough, low grade fevers and elevated WBC.    - CT scan will give further characterization.   - will continue IV Roephin and Doxy.   - Sputum culture.     HTN:  - Continue home ramipril.     CAD:  - Continue ASA, statin and beta blocker.   - Holding plavix for procedure.     DVT prophylaxis:  SCDs    CODE STATUS:    There are no questions and answers to display.       Admission Status:  I believe this patient meets INPATIENT status due to the need for care which can only be reasonably  provided in an hospital setting such as possible need for aggressive/expedited ancillary services and/or consultation services, IV medications, close physician monitoring, and/or procedures.  In such, I feel patient’s risk for adverse outcomes and need for care warrant INPATIENT evaluation and predict the patient’s care encounter to likely last beyond 2 midnights.          Electronically signed by Verónica Gonsalves MD, 04/11/19, 10:12 AM.

## 2019-04-11 NOTE — NURSING NOTE
8.5 Fr chest tube placed on the right side by Dr Julien. 860 mL of fluid was removed. Specimens sent to lab.  Pt tolerated well.  Report called to RN.

## 2019-04-12 PROBLEM — Z85.820 HISTORY OF MELANOMA: Status: ACTIVE | Noted: 2019-01-01

## 2019-04-12 PROBLEM — J98.11 ATELECTASIS, RIGHT: Status: ACTIVE | Noted: 2019-01-01

## 2019-04-12 NOTE — PROGRESS NOTES
[facility] Medicine Services  PROGRESS NOTE    Patient Name: Darron Martino  : 7/3/1932  MRN: [id number]    Date of Admission: 2019  Length of Stay: 1  Primary Care Physician: Duong Greene MD    Subjective   Subjective     CC:  F/u pleural effusion    HPI:  Feels worse today.  Having some trouble breathing.  Tube has not put out very much at all.  + non-productive cough.    Review of Systems  Gen- No fevers, chills  CV- No chest pain, palpitations  Resp- + cough, +  dyspnea  GI- No N/V/D, abd pain    Otherwise ROS is negative except as mentioned in the HPI.    Objective   Objective     Vital Signs:   Temp:  [97.5 °F (36.4 °C)-97.9 °F (36.6 °C)] 97.9 °F (36.6 °C)  Heart Rate:  [76-91] 84  Resp:  [18] 18  BP: (104-131)/(63-82) 126/82        Physical Exam:  Constitutional: appears a little uncomfortable, awake, alert  HENT: NCAT, mucous membranes moist  Respiratory: visbily SOB, some bronchial breath sounds on right.  CT in place with some bloody fluid  Cardiovascular: RRR, no murmurs, rubs, or gallops  Gastrointestinal: Positive bowel sounds, soft, nontender, nondistended  Musculoskeletal: No bilateral ankle edema  Psychiatric: Appropriate affect, cooperative  Neurologic: Oriented x 3, no focal deficits  Skin: No rashes      Results Reviewed:  I have personally reviewed current lab, radiology, and data and agree.    Results from last 7 days   Lab Units 19  0838   WBC 10*3/mm3 19.66*   HEMOGLOBIN g/dL 16.2   HEMATOCRIT % 47.3   PLATELETS 10*3/mm3 344   INR  1.10     Results from last 7 days   Lab Units 19  0841 19  0838   SODIUM mmol/L  --  134*   POTASSIUM mmol/L  --  4.7   CHLORIDE mmol/L  --  94*   CO2 mmol/L  --  26.0   BUN mg/dL  --  29*   CREATININE mg/dL  --  1.13   GLUCOSE mg/dL  --  109*   CALCIUM mg/dL  --  10.1   ALT (SGPT) U/L  --  28   AST (SGOT) U/L  --  40   TROPONIN I ng/mL 0.00  --      Estimated Creatinine Clearance: 51.3 mL/min  (by C-G formula based on SCr of 1.13 mg/dL).    No results found for: BNP    Microbiology Results Abnormal     Procedure Component Value - Date/Time    Blood Culture - Blood, Arm, Left [[id number]] Collected:  04/11/19 1015    Lab Status:  Preliminary result Specimen:  Blood from Arm, Left Updated:  04/12/19 1130     Blood Culture No growth at 24 hours    Blood Culture - Blood, Arm, Right [[id number]] Collected:  04/11/19 1046    Lab Status:  Preliminary result Specimen:  Blood from Arm, Right Updated:  04/12/19 1130     Blood Culture No growth at 24 hours    Body Fluid Culture - Body Fluid, Pleural Cavity [[id number]] Collected:  04/11/19 1414    Lab Status:  Preliminary result Specimen:  Body Fluid from Pleural Cavity Updated:  04/12/19 1057     Body Fluid Culture No growth     Gram Stain Moderate (3+) Red blood cells      No WBCs or organisms seen          Imaging Results (last 24 hours)     Procedure Component Value Units Date/Time    XR Chest 1 View [[id number]] Collected:  04/12/19 0928     Updated:  04/12/19 0928    Narrative:       EXAMINATION: XR CHEST 1 VW- 04/12/2019      INDICATION: V61-Vfswaqm effusion, not elsewhere classified; R06.03-Acute  respiratory distress; J18.9-Pneumonia, unspecified organism      COMPARISON: 04/11/2019     FINDINGS: Right pleural drain remains in place. There is opacification  of most of the right hemithorax, including the previously aerated right  apex. Heart appears to be normal in size. Left lung remains stable with  only mild patchy disease in the left base.           Impression:       Right pleural drain appears to remain in place but there is  still opacification of most of the right chest. Stable mild left basilar  interstitial disease.     D:  04/12/2019  E:  04/12/2019          CT Guided Chest Tube [[id number]] Collected:  04/11/19 1523     Updated:  04/11/19 2218    Narrative:       EXAMINATION: CT-GUIDED CHEST TUBE PLACEMENT-04/11/2019:      INDICATION: Pleural effusion;  L31-Yvznrkm effusion, not elsewhere  classified; R06.03-Acute respiratory distress; J18.9-Pneumonia,  unspecified organism.     TECHNIQUE: Limited low-dose 5 mm axial images for guidance of chest tube  placement.     The radiation dose reduction device was turned on for each scan per the  ALARA (As Low as Reasonably Achievable) protocol.     COMPARISON: Chest CT scan of same date.     FINDINGS: Informed written consent was obtained from the patient prior  to beginning. We discussed the chest tube placement procedure in detail  including potential risks of bleeding, infection, lung collapse, needle  damage to lung, and possible need for larger surgical chest drain  placement in the future. Mr. Guillen indicates his understanding and  agrees to right-sided chest tube placement.     Preliminary scanning of the patient shows mixed density fluid, with  either a small irregular debris or hematocrit layer dependently, or  possibly pleural metastatic disease. There is a large effusion with  atelectasis of most of the right lung.     The skin overlying the largest accessible area of fluid away from  atelectatic lung was marked, prepped and draped in sterile fashion and  1% lidocaine infiltrated into the skin and subcutaneous tissues and down  to the pleural margin with a 1.5 inch and 9 cm 25-gauge needles. The  ribs are very close together, and accordingly, an 8.5-Gabonese catheter  was selected rather than 10.5-Gabonese. Under CT guidance, an 8.5-Gabonese  straight catheter was gradually advanced to the pleural fluid and upon  aspiration of fluid the soft catheter was advanced over the stiffening  cannula and the cannula removed. A total of 860 cc of thin, cloudy, dull  reddish-orange pleural fluid was removed. A 60 cc syringe of fluid was  divided between appropriate labeled containers for labs previously  ordered and hand carried to the pathology lab by the technologist. When  the fluid became more difficult to withdraw, the  tube was sutured in  place with a single 0-silk and secured in place with a Stay fix-type  catheter attachment device and a three-way stopcock exchanged for a  two-way stopcock and sterile drainage set. Preliminary scanning shows  decreased fluid but still extensive remaining right pleural effusion and  poor re-expansion of the right lung. There is no evidence of  pneumothorax or hematoma. Mr. Martino tolerated the procedure well and  states he is breathing much better at the end of the procedure. The  hospitalist will manage the drain from this point.       Impression:       CT-guided 8.5-Icelandic right chest drain placement, and  therapeutic and diagnostic thoracentesis. No complications.     D:  04/11/2019  E:  04/11/2019           This report was finalized on 4/11/2019 10:15 PM by DR. Bret Julien MD.             Results for orders placed in visit on 04/11/17   SCANNED - ECHOCARDIOGRAM       I have reviewed the medications:    Current Facility-Administered Medications:   •  acetaminophen (TYLENOL) tablet 650 mg, 650 mg, Oral, Q4H PRN, Verónica Gonsalves MD, 650 mg at 04/11/19 1614  •  atorvastatin (LIPITOR) tablet 10 mg, 10 mg, Oral, Nightly, Verónica Gonsalves MD, 10 mg at 04/11/19 2201  •  cefTRIAXone (ROCEPHIN) IVPB 1 g, 1 g, Intravenous, Q24H, Verónica Gonsalves MD, Last Rate: 100 mL/hr at 04/12/19 1020, 1 g at 04/12/19 1020  •  doxycycline (VIBRAMYCIN) 100 mg/100 mL 0.9% NS MBP, 100 mg, Intravenous, Q12H, Verónica Gonsalves MD, 100 mg at 04/12/19 1158  •  Magnesium Sulfate 2 gram Bolus, followed by 8 gram infusion (total Mg dose 10 grams)- Mg less than or equal to 1mg/dL, 2 g, Intravenous, PRN **OR** Magnesium Sulfate 2 gram / 50mL Infusion (GIVE X 3 BAGS TO EQUAL 6GM TOTAL DOSE) - Mg 1.1 - 1.5 mg/dl, 2 g, Intravenous, PRN **OR** Magnesium Sulfate 4 gram infusion- Mg 1.6-1.9 mg/dL, 4 g, Intravenous, PRN, Verónica Gonsalves MD  •  metoprolol succinate XL (TOPROL-XL) 24 hr tablet 50 mg, 50 mg, Oral, Daily,  Verónica Gonsalves MD, 50 mg at 04/12/19 1020  •  ondansetron (ZOFRAN) tablet 4 mg, 4 mg, Oral, Q6H PRN **OR** ondansetron (ZOFRAN) injection 4 mg, 4 mg, Intravenous, Q6H PRN, Verónica Gonsalves MD  •  potassium chloride 10 mEq in 100 mL IVPB, 10 mEq, Intravenous, Q1H PRN, Verónica Gonsalves MD  •  ramipril (ALTACE) capsule 5 mg, 5 mg, Oral, Daily, Verónica Gonsalves MD, 5 mg at 04/12/19 1157  •  sennosides-docusate sodium (SENOKOT-S) 8.6-50 MG tablet 2 tablet, 2 tablet, Oral, Nightly, Verónica Gonsalves MD  •  sodium chloride 0.9 % flush 10 mL, 10 mL, Intravenous, PRN, Layo Ventura MD  •  sodium chloride 0.9 % flush 3 mL, 3 mL, Intravenous, Q12H, Verónica Gonsalves MD, 3 mL at 04/12/19 1022  •  sodium chloride 0.9 % flush 3-10 mL, 3-10 mL, Intravenous, PRN, Verónica Gonsalves MD  •  traMADol (ULTRAM) tablet 50 mg, 50 mg, Oral, Nightly, Verónica Gonsalves MD, 50 mg at 04/11/19 2201      Assessment/Plan   Assessment / Plan     Active Hospital Problems    Diagnosis POA   • **Pleural effusion [J90] Yes     Priority: Medium   • History of melanoma [Z85.820] Not Applicable   • Hypertension [I10] Yes   • CAD (coronary artery disease) s/po CABG [I25.10] Yes          Brief Hospital Course to date:  Darron Martino is a 86 y.o. male with a history of coronary artery disease status post CABG, and hypertension who presents to the emergency room with complaints of progressive shortness of breath over the last 7-10 days.  Found to have a significant right-sided pleural effusion.  Underwent a 860 cc thoracentesis with radiology on day of admission with chest tube placement.  Started on empiric antibiotics for possible pneumonia.      - reviewed CXR this AM.  Has worsening aeration of right lung despite thoracentesis.  Minimal output of chest tube.  Unclear if has mucus plus, obstructing mass, or undrainged fluid.  - d/w Dr. Solorzano with pulm and he is about the see patient.  Will need bronch at some point.  Await  official recs.  - continue empiric abx for possible post-obtructive PNA.  WBC elevated on admission, will check AM.  - reviewed fluid studies.  Consistent with exudative.  Concern for malignancy, await cytology    DVT Prophylaxis:  mechnical    Disposition: I expect the patient to be discharged TBD.    CODE STATUS:   Code Status and Medical Interventions:   Ordered at: 04/11/19 1638     Limited Support to NOT Include:    Intubation     Level Of Support Discussed With:    Patient     Code Status:    No CPR     Medical Interventions (Level of Support Prior to Arrest):    Limited         Electronically signed by Severino Damon MD, 04/12/19, 1:59 PM.

## 2019-04-12 NOTE — PLAN OF CARE
Problem: Breathing Pattern Ineffective (Adult)  Goal: Identify Related Risk Factors and Signs and Symptoms  Outcome: Ongoing (interventions implemented as appropriate)   04/12/19 1435   Breathing Pattern Ineffective (Adult)   Signs and Symptoms (Breathing Pattern Ineffective) breathing pattern altered     Goal: Effective Oxygenation/Ventilation  Outcome: Ongoing (interventions implemented as appropriate)   04/12/19 1435   Breathing Pattern Ineffective (Adult)   Effective Oxygenation/Ventilation unable to achieve out   04/12/19 1435   Breathing Pattern Ineffective (Adult)   Effective Oxygenation/Ventilation making progress toward outcome   come     Goal: Anxiety/Fear Reduction  Outcome: Ongoing (interventions implemented as appropriate)   04/12/19 1435   Breathing Pattern Ineffective (Adult)   Anxiety/Fear Reduction unable to achieve outcome      04/12/19 1435   Breathing Pattern Ineffective (Adult)   Anxiety/Fear Reduction unable to achieve outcome       Problem: Pneumonia (Adult)  Goal: Signs and Symptoms of Listed Potential Problems Will be Absent, Minimized or Managed (Pneumonia)  Outcome: Ongoing (interventions implemented as appropriate)   04/12/19 1435   Goal/Outcome Evaluation   Problems Assessed (Pneumonia) all   Problems Present (Pneumonia) respiratory compromise       Problem: Patient Care Overview  Goal: Plan of Care Review  Outcome: Ongoing (interventions implemented as appropriate)   04/12/19 1435   Coping/Psychosocial   Plan of Care Reviewed With patient   Coping/Psychosocial   Patient Agreement with Plan of Care agrees   Plan of Care Review   Progress no change   OTHER   Outcome Summary very soa with any excertion

## 2019-04-12 NOTE — PLAN OF CARE
Problem: Patient Care Overview  Goal: Plan of Care Review  Outcome: Ongoing (interventions implemented as appropriate)   04/12/19 6582   Coping/Psychosocial   Plan of Care Reviewed With patient   Plan of Care Review   Progress improving   OTHER   Outcome Summary Pt rested intermittently throughout the night. VSS. SR, 3L NC. CT to 20cm LWS. Minimal output. Cont. IV abx. Will continue to monitor.

## 2019-04-12 NOTE — CONSULTS
Intensivist Note     4/12/2019  Hospital Day: 1    Mr. Darron Martino, 86 y.o. male is followed for:    Right pleural effusion exudative    Right lung atelectasis due to large pleural effusion    Melanoma resected left arm 2018    CAD s/p MI and CABG in 1993    Hypertension       SUBJECTIVE     86-year-old  white male remote smoker (quit 26 years ago) admitted 4/11/19 complaining of rapidly progressive dyspnea.    The patient had no prior history of a pulmonary disorder although he is known to have CAD with a myocardial infarction and CABG 1993.  Last echocardiogram available for/11/17 noted LVEF 45-50% with hypokinesis of inferior wall and septum and pulmonary artery systolic pressure of 45 mmHg.  Patient's primary physician Dr. Che saw him for routine physical March 2019 and he appeared to be doing quite well with normal labs, EKG etc.  He then followed up with Dr. Quiroga later in March and mentioned that he had noted some increase edema.  He was started on Norvasc and a diuretic and his metoprolol was changed from 25 mg twice daily to an extended release 50 mg nightly dose.  After 5 days he noted increasing dyspnea which he thought was related to the Norvasc so he stopped it as well as his diuretic.  Towards the end of March he began to note abdominal swelling, low-grade temperature, and a dry cough especially if he was supine.  He saw Dr. Che/4/19 and again his EKG was normal and his exam was unremarkable.  2 days after that visit he began to note significant increase in dyspnea with any activity.  He finally went to [facility] ER on 4/11/19 because of dyspnea.  Denied chest pain but noted epigastric pressure as well as the above-mentioned lower extremity edema dry nonproductive cough.  He had markedly diminished breath sounds in the right chest but no other physical findings.  Chest x-ray revealed nearly total opacification of the right chest confirmed to be a pleural effusion on CT scan.  Pigtail  "drain was placed with removal of 860 cc of thin cloudy dull reddish orange pleural fluid.    Patient initially felt improved with thoracentesis, but has become progressively more short of breath since last evening. He has had no fever but WBC is 19,000 and he remains on empiric antimicrobial coverage.  Pleural fluid is documented to be an exudate based on protein and LDH criteria, with a normal pH and glucose.  I called cytology and asked him to do a stat read on the pleural fluid sent yesterday.  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  With his previous history of melanoma that is most likely.  He does have a history of smoking raising the possibility of a metastatic small cell CA or even a lymphoma but those would be a long shots.    The patient's relevant past medical, surgical and social history were reviewed and updated in Epic as appropriate.    OBJECTIVE     /82   Pulse 84   Temp 97.9 °F (36.6 °C) (Oral)   Resp 18   Ht 175.3 cm (69\")   Wt 87.2 kg (192 lb 3.2 oz)   SpO2 93%   BMI 28.38 kg/m²      Flow (L/min): 3    Flowsheet Rows      First Filed Value   Admission Height  175.3 cm (69\") Documented at 04/11/2019 0825   Admission Weight  85.3 kg (188 lb) Documented at 04/11/2019 0825        Intake & Output (last day)       04/11 0701 - 04/12 0700 04/12 0701 - 04/13 0700    P.O. 260     IV Piggyback 100     Total Intake(mL/kg) 360 (4.1)     Urine (mL/kg/hr) 590     Chest Tube 15     Total Output 605     Net -245                 Exam:  General Exam:  Present elderly white male who appears to be moderately dyspneic  HEENT: Pupils equal and reactive. Nose and throat clear.  Neck:                          Supple, no JVD, thyromegaly, or adenopathy  Lungs: Bronchial breath sounds on the right with marked dullness to percussion throughout.  Left lung clear.  Chest:                         Small right pigtail pleural tube in place in the right inferolateral chest " wall.  Cardiovascular: RRR without murmurs or gallops.  Abdomen: Soft nontender without organomegaly or masses.  Obese   and rectal: Deferred.  Extremities: No cyanosis clubbing edema.  Menses apparently had edema in ER but that has now resolved)  Neurologic:                 Symmetric strength. No focal deficits.  Alert and oriented x3    Chest X-Ray: Total opacification of the right chest but no volume loss.      Results from last 7 days   Lab Units 04/11/19  0838   WBC 10*3/mm3 19.66*   HEMOGLOBIN g/dL 16.2   HEMATOCRIT % 47.3   PLATELETS 10*3/mm3 344     Results from last 7 days   Lab Units 04/11/19  0838   SODIUM mmol/L 134*   POTASSIUM mmol/L 4.7   CHLORIDE mmol/L 94*   CO2 mmol/L 26.0   BUN mg/dL 29*   CREATININE mg/dL 1.13   GLUCOSE mg/dL 109*   CALCIUM mg/dL 10.1         Results from last 7 days   Lab Units 04/11/19  0838   ALK PHOS U/L 89   BILIRUBIN mg/dL 0.6   ALT (SGPT) U/L 28   AST (SGOT) U/L 40       Lab Results   Component Value Date    SEDRATE 1 09/29/2016     No results found for: BNP  No results found for: CKTOTAL, CKMB, CKMBINDEX, TROPONINI, TROPONINT  Lab Results   Component Value Date    TSH 2.480 02/20/2019     Lab Results   Component Value Date    LACTATE 1.5 04/11/2019     No results found for: CORTISOL      I reviewed the patient's results, images and medication.    Assessment/Plan   ASSESSMENT        Right pleural effusion exudative    Right lung atelectasis due to large pleural effusion    Melanoma resected left arm 2018    CAD s/p MI and CABG in 1993    Hypertension      DISCUSSION: Unfortunate situation.  This is almost surely due to metastatic melanoma with a rapidly accumulating pleural effusion.  Pleural tube did not appear to be draining appropriately, so I instilled TPA and dornase to see if we could break up any blood clots.  When I initially unhooked his chest drain there was a clot within the line.  We will leave this in for approximately an hour and then try to drain his  chest.  If it is ineffective will need a larger chest tube.    PLAN     1.  Instill TPA/Dornase into pleural space-done  2.  In 1 hour resume suction via chest tube and see if right chest can be drained.  3.  If this is ineffective, will need a larger chest tube placed for relief of his dyspnea  4.  Await the final special stains on the abnormal cells in the cytology but I am sure this will be a melanoma.  5.  Case discussed briefly with Dr. Vale and he will consult tomorrow.  6.  Once oncology discusses the case with patient, depending on options (which I am sure will be limited), we should discuss CODE STATUS.    Plan of care and goals reviewed with mulitdisciplinary team at daily rounds.    I discussed the patient's findings and my recommendations with patient, family, nursing staff, primary care team and consulting provider    Time spent Critical care 60 min (It does not include procedure time).    Juan R Solorzano MD  Intensive Care Medicine  04/12/19 3:39 PM

## 2019-04-13 PROBLEM — E87.1 HYPONATREMIA: Status: ACTIVE | Noted: 2019-01-01

## 2019-04-13 NOTE — PROGRESS NOTES
[facility] Medicine Services  PROGRESS NOTE    Patient Name: Darron Martino  : 7/3/1932  MRN: [id number]    Date of Admission: 2019  Length of Stay: 2  Primary Care Physician: Duong Greene MD    Subjective   Subjective     CC:  F/u pleural effusion    HPI:  No changes overnight.  Continues to be significantly SOB.  Chest tube with minimal drainage overnight despite attempt to break up clots.    Review of Systems  Gen- No fevers, chills  CV- No chest pain, palpitations  Resp- + cough, +  dyspnea  GI- No N/V/D, abd pain    Otherwise ROS is negative except as mentioned in the HPI.    Objective   Objective     Vital Signs:   Temp:  [97.7 °F (36.5 °C)-98.1 °F (36.7 °C)] 98.1 °F (36.7 °C)  Heart Rate:  [] 93  Resp:  [18-20] 18  BP: ()/(58-81) 119/74        Physical Exam:  Constitutional: looks uncomfortable, awake, alert  HENT: NCAT, mucous membranes moist  Respiratory: visbily SOB, some bronchial breath sounds on right.  CT in place with some bloody fluid, minimal amount  Cardiovascular: RRR, no murmurs, rubs, or gallops  Gastrointestinal: Positive bowel sounds, soft, nontender, nondistended  Musculoskeletal: No bilateral ankle edema  Psychiatric: Appropriate affect, cooperative  Neurologic: Oriented x 3, no focal deficits  Skin: No rashes      Results Reviewed:  I have personally reviewed current lab, radiology, and data and agree.    Results from last 7 days   Lab Units 19  0401 19  0838   WBC 10*3/mm3 13.13* 19.66*   HEMOGLOBIN g/dL 15.9 16.2   HEMATOCRIT % 46.7 47.3   PLATELETS 10*3/mm3 301 344   INR   --  1.10     Results from last 7 days   Lab Units 19  0401 19  0841 19  0838   SODIUM mmol/L 128*  --  134*   POTASSIUM mmol/L 4.7  --  4.7   CHLORIDE mmol/L 94*  --  94*   CO2 mmol/L 20.0*  --  26.0   BUN mg/dL 25*  --  29*   CREATININE mg/dL 0.89  --  1.13   GLUCOSE mg/dL 97  --  109*   CALCIUM mg/dL 9.0  --  10.1   ALT  (SGPT) U/L  --   --  28   AST (SGOT) U/L  --   --  40   TROPONIN I ng/mL  --  0.00  --      Estimated Creatinine Clearance: 64.8 mL/min (by C-G formula based on SCr of 0.89 mg/dL).    No results found for: BNP    Microbiology Results Abnormal     Procedure Component Value - Date/Time    Blood Culture - Blood, Arm, Left [[id number]] Collected:  04/11/19 1015    Lab Status:  Preliminary result Specimen:  Blood from Arm, Left Updated:  04/12/19 1130     Blood Culture No growth at 24 hours    Blood Culture - Blood, Arm, Right [[id number]] Collected:  04/11/19 1046    Lab Status:  Preliminary result Specimen:  Blood from Arm, Right Updated:  04/12/19 1130     Blood Culture No growth at 24 hours    Body Fluid Culture - Body Fluid, Pleural Cavity [[id number]] Collected:  04/11/19 1414    Lab Status:  Preliminary result Specimen:  Body Fluid from Pleural Cavity Updated:  04/12/19 1057     Body Fluid Culture No growth     Gram Stain Moderate (3+) Red blood cells      No WBCs or organisms seen          Imaging Results (last 24 hours)     Procedure Component Value Units Date/Time    XR Chest 1 View [[id number]] Updated:  04/13/19 0558    XR Chest 1 View [[id number]] Collected:  04/12/19 0928     Updated:  04/12/19 0928    Narrative:       EXAMINATION: XR CHEST 1 VW- 04/12/2019      INDICATION: H26-Elafmkb effusion, not elsewhere classified; R06.03-Acute  respiratory distress; J18.9-Pneumonia, unspecified organism      COMPARISON: 04/11/2019     FINDINGS: Right pleural drain remains in place. There is opacification  of most of the right hemithorax, including the previously aerated right  apex. Heart appears to be normal in size. Left lung remains stable with  only mild patchy disease in the left base.           Impression:       Right pleural drain appears to remain in place but there is  still opacification of most of the right chest. Stable mild left basilar  interstitial disease.     D:  04/12/2019  E:  04/12/2019                 Results for orders placed in visit on 04/11/17   SCANNED - ECHOCARDIOGRAM       I have reviewed the medications:    Current Facility-Administered Medications:   •  acetaminophen (TYLENOL) tablet 650 mg, 650 mg, Oral, Q4H PRN, Verónica Gonsalves MD, 650 mg at 04/11/19 1614  •  atorvastatin (LIPITOR) tablet 10 mg, 10 mg, Oral, Nightly, Verónica Gonsalves MD, 10 mg at 04/12/19 2045  •  cefTRIAXone (ROCEPHIN) IVPB 1 g, 1 g, Intravenous, Q24H, Verónica Gonsalves MD, Last Rate: 100 mL/hr at 04/12/19 1020, 1 g at 04/12/19 1020  •  [COMPLETED] alteplase ((CATHFLO/ACTIVASE)) 10 mg in sodium chloride 0.9 % 30 mL Syringe, 10 mg, Intrapleural, BID, 10 mg at 04/12/19 1709 **AND** dornase alpha (PULMOZYME) 5 mg in sterile water (preservative free) 30 mL, 5 mg, Intrapleural, BID, Juan R Solorzano MD  •  doxycycline (VIBRAMYCIN) 100 mg/100 mL 0.9% NS MBP, 100 mg, Intravenous, Q12H, Verónica Gonsalves MD, 100 mg at 04/13/19 0015  •  Magnesium Sulfate 2 gram Bolus, followed by 8 gram infusion (total Mg dose 10 grams)- Mg less than or equal to 1mg/dL, 2 g, Intravenous, PRN **OR** Magnesium Sulfate 2 gram / 50mL Infusion (GIVE X 3 BAGS TO EQUAL 6GM TOTAL DOSE) - Mg 1.1 - 1.5 mg/dl, 2 g, Intravenous, PRN **OR** Magnesium Sulfate 4 gram infusion- Mg 1.6-1.9 mg/dL, 4 g, Intravenous, PRN, Verónica Gonsalves MD  •  metoprolol succinate XL (TOPROL-XL) 24 hr tablet 50 mg, 50 mg, Oral, Daily, Verónica Gonsalves MD, 50 mg at 04/12/19 1020  •  ondansetron (ZOFRAN) tablet 4 mg, 4 mg, Oral, Q6H PRN **OR** ondansetron (ZOFRAN) injection 4 mg, 4 mg, Intravenous, Q6H PRN, Verónica Gonsalves MD  •  potassium chloride 10 mEq in 100 mL IVPB, 10 mEq, Intravenous, Q1H PRN, Verónica Gonsalves MD  •  ramipril (ALTACE) capsule 5 mg, 5 mg, Oral, Daily, Verónica Gonsalves MD, 5 mg at 04/12/19 1157  •  sennosides-docusate sodium (SENOKOT-S) 8.6-50 MG tablet 2 tablet, 2 tablet, Oral, Nightly, Verónica Gonsalves MD, 2 tablet at 04/12/19 2045  •  sodium  chloride 0.9 % flush 10 mL, 10 mL, Intravenous, PRN, Layo Ventura MD  •  sodium chloride 0.9 % flush 3 mL, 3 mL, Intravenous, Q12H, Verónica Gonsalves MD, 3 mL at 04/12/19 2210  •  sodium chloride 0.9 % flush 3-10 mL, 3-10 mL, Intravenous, PRN, Verónica Gonsalves MD  •  traMADol (ULTRAM) tablet 50 mg, 50 mg, Oral, Nightly, Verónica Gonsalves MD, 50 mg at 04/12/19 2045      Assessment/Plan   Assessment / Plan     Active Hospital Problems    Diagnosis POA   • **Malignant massive right pleural effusion. Possible Lung primary vs. Melanoma [J90] Yes     Priority: Medium   • Hyponatremia [E87.1] Yes   • Melanoma resected left arm 2018 [Z85.820] Not Applicable   • Right lung atelectasis due to large pleural effusion [J98.11] Yes   • Hypertension [I10] Yes   • CAD s/p MI and CABG in 1993 [I25.10] Yes          Brief Hospital Course to date:  Darron Martino is a 86 y.o. male with a history of coronary artery disease status post CABG, and hypertension who presents to the emergency room with complaints of progressive shortness of breath over the last 7-10 days.  Found to have a significant right-sided pleural effusion.  Underwent a 860 cc thoracentesis with radiology on day of admission with chest tube placement.  Started on empiric antibiotics for possible pneumonia.      - d/w Dr. Solorzano yesterday.  He attempted to break up clot in CT with tPA/dornase however no output overnight.  CXR this AM reviewed and still shows white-out on the right.  I discussed with Dr. Burnette who will come see the patient this morning and decide on best approach for further drainage.  - preliminary cytology could be consistent with melanoma.  Seen by Dr. Zaragoza this morning.  Await final path.  .  - continue empiric abx for possible post-obtructive PNA.  WBC elevated on admission, trending down now.    DVT Prophylaxis:  mechanical    Disposition: I expect the patient to be discharged TBD.    CODE STATUS:   Code Status and Medical  Interventions:   Ordered at: 04/11/19 1638     Limited Support to NOT Include:    Intubation     Level Of Support Discussed With:    Patient     Code Status:    No CPR     Medical Interventions (Level of Support Prior to Arrest):    Limited         Electronically signed by Severino Damon MD, 04/13/19, 8:33 AM.

## 2019-04-13 NOTE — CONSULTS
Cardiothoracic Surgery History & Physical       Requesting physician: Dr. Severino Damon MD    Chief complaint: Shortness of breath    HPI: 86-year-old  male with a history of hypertension, melanoma, remote tobacco abuse, coronary artery disease and TIA who presented with shortness of breath.  The patient notes worsening shortness of breath over the past week.  He has lost approximately 10 pounds over the past 3 weeks.  Mr. Guillen denies cough, hemoptysis, lymphadenopathy or chills.  He does note associated low-grade temperatures.      Past Medical History:   Diagnosis Date   • Abnormal glucose    • Acquired hyperlipoproteinemia    • Acute bronchitis    • Allergic rhinitis    • Arteriosclerosis of coronary artery    • Cellulitis and abscess    • Cough    • Edema extremities    • Elevated PSA    • Encounter for prostate cancer screening    • Generalized osteoarthrosis, involving multiple sites    • Hypertension    • Lumbosacral spondylosis without myelopathy    • Pain in joint, pelvic region and thigh    • Preventative health care    • Right groin pain    • Transient cerebral ischemia    • Ulnar neuropathy    • Upper back pain on right side    • UTI (urinary tract infection)    • Varicose veins      Past Surgical History:   Procedure Laterality Date   • CARDIAC SURGERY     • INGUINAL HERNIA REPAIR     • LEG SURGERY       Family History   Problem Relation Age of Onset   • Hypertension Mother    • Arthritis Father      Social History     Tobacco Use   • Smoking status: Former Smoker   • Smokeless tobacco: Never Used   Substance Use Topics   • Alcohol use: Yes     Comment: occasional   • Drug use: No     Occupation: Retired, previous full time [employer],   Lives in [place] with his wife  Medications Prior to Admission   Medication Sig Dispense Refill Last Dose   • clopidogrel (PLAVIX) 75 MG tablet Take 1 tablet by mouth Daily. 90 tablet 3 Taking   • ibuprofen (ADVIL,MOTRIN) 800 MG tablet Take 1 tablet by  mouth Every 8 (Eight) Hours As Needed for Mild Pain . 90 tablet 11 Taking   • L-Arginine 1000 MG tablet Take 1,000 mg by mouth 2 (Two) Times a Day.      • metoprolol succinate XL (TOPROL-XL) 50 MG 24 hr tablet Take 50 mg by mouth Daily.      • Multiple Vitamins-Minerals (MULTIVITAL PO) Take 1 tablet by mouth daily.   Taking   • pravastatin (PRAVACHOL) 80 MG tablet Take 80 mg by mouth Every Night.      • traMADol (ULTRAM) 50 MG tablet Take 1 tablet by mouth Every Night. 30 tablet 5    • TURMERIC CURCUMIN PO Take 1 tablet by mouth Daily.      • spironolactone-hydrochlorothiazide (ALDACTAZIDE) 25-25 MG tablet Take 0.5 tablets by mouth Daily.   Taking     Allergies:  Hydrocodone and Norvasc [amlodipine besylate]    Review of Systems:    A comprehensive review of systems was negative except for:   Constitutional: positive for anorexia, fevers and weight loss  Respiratory: positive for dyspnea on exertion  Cardiovascular: positive for dyspnea    All other systems were reviewed and are negative.    Vital Signs:  Temp:  [97.7 °F (36.5 °C)-98.1 °F (36.7 °C)] 97.7 °F (36.5 °C)  Heart Rate:  [] 79  Resp:  [18-22] 18  BP: ()/(63-84) 114/79    Physical Exam:  Physical Exam   Constitutional: He is oriented to person, place, and time. He appears well-developed and well-nourished. No distress.   HENT:   Head: Normocephalic and atraumatic.   Eyes: Conjunctivae are normal. No scleral icterus.   Neck: Neck supple. No JVD present. No tracheal deviation present.   No carotid bruits bilaterally   Cardiovascular: Normal rate, regular rhythm and normal heart sounds. Exam reveals no gallop and no friction rub.   No murmur heard.  Pulmonary/Chest: He is in respiratory distress. He has no wheezes. He has no rales.   Decreased breath sounds on the right.  Increased work of breathing with tachypnea.   Abdominal: Soft. He exhibits no distension and no mass. There is no tenderness. There is no rebound and no guarding.    Musculoskeletal: Normal range of motion. He exhibits no edema.   Neurological: He is alert and oriented to person, place, and time.   Skin: Skin is warm and dry. No rash noted. He is not diaphoretic. No erythema.   Psychiatric: He has a normal mood and affect. His behavior is normal. Judgment and thought content normal.       Labs:  Results from last 7 days   Lab Units 04/13/19  0401   WBC 10*3/mm3 13.13*   HEMOGLOBIN g/dL 15.9   HEMATOCRIT % 46.7   PLATELETS 10*3/mm3 301     Results from last 7 days   Lab Units 04/13/19  0401   SODIUM mmol/L 128*   POTASSIUM mmol/L 4.7   CHLORIDE mmol/L 94*   CO2 mmol/L 20.0*   BUN mg/dL 25*   CREATININE mg/dL 0.89   GLUCOSE mg/dL 97   CALCIUM mg/dL 9.0         Coagulation:   Lab Results   Component Value Date    INR 1.10 04/11/2019       Imaging:   -CT of the chest performed 4/11/2019, personally reviewed, demonstrates a large right pleural effusion.  Near complete atelectasis of the entire right lung.  No pathologic adenopathy appreciated.  -Chest x-ray performed 4/13/2019, personally reviewed, demonstrates complete opacification of the right chest.  There are sternal wires status post CABG.    Assessment: 86-year-old  male with a history of hypertension, melanoma, remote tobacco abuse, coronary artery disease and TIA who presented with shortness of breath.  A large right pleural effusion was found and the patient underwent tube thoracostomy in interventional radiology.  Preliminary pathology results are concerning for possible malignancy.  I have been asked to see the patient given his continued pleural effusion despite tube placement    Plan: Will place a larger bore pigtail at the bedside given the patient's discomfort.  He may need a PleurX catheter for this likely malignant effusion if it persists.      Juan R Burnette MD  04/13/19  7:10 PM

## 2019-04-13 NOTE — PLAN OF CARE
Problem: Breathing Pattern Ineffective (Adult)  Goal: Identify Related Risk Factors and Signs and Symptoms  Outcome: Ongoing (interventions implemented as appropriate)    Goal: Effective Oxygenation/Ventilation  Outcome: Ongoing (interventions implemented as appropriate)    Goal: Anxiety/Fear Reduction  Outcome: Ongoing (interventions implemented as appropriate)      Problem: Pneumonia (Adult)  Goal: Signs and Symptoms of Listed Potential Problems Will be Absent, Minimized or Managed (Pneumonia)  Outcome: Ongoing (interventions implemented as appropriate)      Problem: Patient Care Overview  Goal: Plan of Care Review  Outcome: Ongoing (interventions implemented as appropriate)   04/13/19 0451   Coping/Psychosocial   Plan of Care Reviewed With patient   Coping/Psychosocial   Patient Agreement with Plan of Care agrees   OTHER   Outcome Summary VSS, chest tube not draining, plan to insert a large bore CT today, remains SOA, and feeling poorly

## 2019-04-13 NOTE — PLAN OF CARE
Problem: Breathing Pattern Ineffective (Adult)  Goal: Identify Related Risk Factors and Signs and Symptoms  Outcome: Outcome(s) achieved Date Met: 04/13/19    Goal: Effective Oxygenation/Ventilation  Outcome: Ongoing (interventions implemented as appropriate)    Goal: Anxiety/Fear Reduction  Outcome: Ongoing (interventions implemented as appropriate)      Problem: Pneumonia (Adult)  Goal: Signs and Symptoms of Listed Potential Problems Will be Absent, Minimized or Managed (Pneumonia)  Outcome: Ongoing (interventions implemented as appropriate)      Problem: Patient Care Overview  Goal: Plan of Care Review  Outcome: Ongoing (interventions implemented as appropriate)   04/13/19 1648   Coping/Psychosocial   Plan of Care Reviewed With patient   Plan of Care Review   Progress no change   OTHER   Outcome Summary VSS, ct sx consulted new CT placed. drained approx. 2800 ml w/in an hour. was able to titrate oxygen from 5L to 2L NC, minimal pain reported. will continue to monitor

## 2019-04-13 NOTE — CONSULTS
"REFERRING PHYSICIAN: Severino Damon MD    PRIMARY CARE PROVIDER: Duong Greene MD    REASON FOR CONSULTATION: likely metastatic melanoma      HISTORY OF PRESENT ILLNESS: 86-year-old gentleman with a melanoma that was resected in 2018 on his left forearm presents to the hospital with a fairly large right pleural effusion.  Pathology still pending.  I was consulted due to concern of metastatic melanoma in the setting.  He lives in [place].  He is otherwise in reasonable health.  He lives with his wife.      Allergies   Allergen Reactions   • Hydrocodone Unknown (See Comments)     Originally listed as \"other\" with hydrocodone/apap in the comment field.    • Norvasc [Amlodipine Besylate]        Past Medical History:   Diagnosis Date   • Abnormal glucose    • Acquired hyperlipoproteinemia    • Acute bronchitis    • Allergic rhinitis    • Arteriosclerosis of coronary artery    • Cellulitis and abscess    • Cough    • Edema extremities    • Elevated PSA    • Encounter for prostate cancer screening    • Generalized osteoarthrosis, involving multiple sites    • Hypertension    • Lumbosacral spondylosis without myelopathy    • Pain in joint, pelvic region and thigh    • Preventative health care    • Right groin pain    • Transient cerebral ischemia    • Ulnar neuropathy    • Upper back pain on right side    • UTI (urinary tract infection)    • Varicose veins          Current Facility-Administered Medications:   •  acetaminophen (TYLENOL) tablet 650 mg, 650 mg, Oral, Q4H PRN, Vernóica Gonsalves MD, 650 mg at 04/11/19 1614  •  atorvastatin (LIPITOR) tablet 10 mg, 10 mg, Oral, Nightly, Verónica Gonsalves MD, 10 mg at 04/12/19 2045  •  cefTRIAXone (ROCEPHIN) IVPB 1 g, 1 g, Intravenous, Q24H, Verónica Gonsalves MD, Last Rate: 100 mL/hr at 04/12/19 1020, 1 g at 04/12/19 1020  •  [COMPLETED] alteplase ((CATHFLO/ACTIVASE)) 10 mg in sodium chloride 0.9 % 30 mL Syringe, 10 mg, Intrapleural, BID, 10 mg at 04/12/19 1709 " **AND** dornase alpha (PULMOZYME) 5 mg in sterile water (preservative free) 30 mL, 5 mg, Intrapleural, BID, Juan R Solorzano MD  •  doxycycline (VIBRAMYCIN) 100 mg/100 mL 0.9% NS MBP, 100 mg, Intravenous, Q12H, Verónica Gonsalves MD, 100 mg at 04/13/19 0015  •  Magnesium Sulfate 2 gram Bolus, followed by 8 gram infusion (total Mg dose 10 grams)- Mg less than or equal to 1mg/dL, 2 g, Intravenous, PRN **OR** Magnesium Sulfate 2 gram / 50mL Infusion (GIVE X 3 BAGS TO EQUAL 6GM TOTAL DOSE) - Mg 1.1 - 1.5 mg/dl, 2 g, Intravenous, PRN **OR** Magnesium Sulfate 4 gram infusion- Mg 1.6-1.9 mg/dL, 4 g, Intravenous, PRN, Verónica Gonsalves MD  •  metoprolol succinate XL (TOPROL-XL) 24 hr tablet 50 mg, 50 mg, Oral, Daily, Verónica Gonsalves MD, 50 mg at 04/12/19 1020  •  ondansetron (ZOFRAN) tablet 4 mg, 4 mg, Oral, Q6H PRN **OR** ondansetron (ZOFRAN) injection 4 mg, 4 mg, Intravenous, Q6H PRN, Verónica Gonsalves MD  •  potassium chloride 10 mEq in 100 mL IVPB, 10 mEq, Intravenous, Q1H PRN, Verónica Gonsalves MD  •  ramipril (ALTACE) capsule 5 mg, 5 mg, Oral, Daily, Verónica Gonsalves MD, 5 mg at 04/12/19 1157  •  sennosides-docusate sodium (SENOKOT-S) 8.6-50 MG tablet 2 tablet, 2 tablet, Oral, Nightly, Verónica Gonsalves MD, 2 tablet at 04/12/19 2045  •  sodium chloride 0.9 % flush 10 mL, 10 mL, Intravenous, PRN, Layo Ventura MD  •  sodium chloride 0.9 % flush 3 mL, 3 mL, Intravenous, Q12H, Verónica Gonsalves MD, 3 mL at 04/12/19 2210  •  sodium chloride 0.9 % flush 3-10 mL, 3-10 mL, Intravenous, PRN, Verónica Gonsalves MD  •  traMADol (ULTRAM) tablet 50 mg, 50 mg, Oral, Nightly, Verónica Gonsalves MD, 50 mg at 04/12/19 2045    Past Surgical History:   Procedure Laterality Date   • CARDIAC SURGERY     • INGUINAL HERNIA REPAIR     • LEG SURGERY         Social History     Socioeconomic History   • Marital status:      Spouse name: Not on file   • Number of children: Not on file   • Years of education: Not on file    • Highest education level: Not on file   Tobacco Use   • Smoking status: Former Smoker   • Smokeless tobacco: Never Used   Substance and Sexual Activity   • Alcohol use: Yes     Comment: occasional   • Drug use: No   • Sexual activity: No       Family History   Problem Relation Age of Onset   • Hypertension Mother    • Arthritis Father         No history exists.         REVIEW OF SYSTEMS:  A 14 point review of systems was performed and is negative except as noted below.    Review of Systems - Oncology      Objective     Vitals:    04/12/19 1800 04/12/19 2203 04/12/19 2221 04/13/19 0504   BP:  90/77 107/81 119/74   BP Location:  Left arm Left arm    Patient Position:  Lying Lying    Pulse: 104 105 104 93   Resp:  20  18   Temp:  97.7 °F (36.5 °C)  98.1 °F (36.7 °C)   TempSrc:  Oral  Oral   SpO2: 95% 95% 95%    Weight:    86.3 kg (190 lb 4.8 oz)   Height:           Temp:  [97.7 °F (36.5 °C)-98.1 °F (36.7 °C)] 98.1 °F (36.7 °C)     Performance Status: 2    Physical Exam    General: elderly appearing male in no acute distress  HEENT: sclera anicteric, oropharynx clear  Lymphatics: no cervical, supraclavicular, or axillary adenopathy  Cardiovascular: regular rate and rhythm, no murmurs  Lungs: clear to auscultation bilaterally  Abdomen: soft, nontender, nondistended.  No palpable organomegaly  Extremeties: no lower extremity edema  Skin: no rashes, lesions, bruising, or petechiae      LABS:    Lab Results   Component Value Date    HGB 15.9 04/13/2019    HCT 46.7 04/13/2019    MCV 90.7 04/13/2019     04/13/2019    WBC 13.13 (H) 04/13/2019    NEUTROABS 15.04 (H) 04/11/2019    LYMPHSABS 2.17 04/11/2019    MONOSABS 2.41 (H) 04/11/2019    EOSABS 0.02 04/11/2019    BASOSABS 0.02 04/11/2019     Lab Results   Component Value Date    GLUCOSE 97 04/13/2019    BUN 25 (H) 04/13/2019    CREATININE 0.89 04/13/2019     (L) 04/13/2019    K 4.7 04/13/2019    CL 94 (L) 04/13/2019    CO2 20.0 (L) 04/13/2019    CALCIUM 9.0  04/13/2019    PROTEINTOT 7.7 04/11/2019    ALBUMIN 4.00 04/11/2019    BILITOT 0.6 04/11/2019    ALKPHOS 89 04/11/2019    AST 40 04/11/2019    ALT 28 04/11/2019         IMAGING    Ct Chest Without Contrast    Result Date: 4/11/2019  EXAMINATION: CT CHEST WO CONTRAST-04/11/2019:  INDICATION: SOA.  TECHNIQUE: CT chest without intravenous contrast administration.  The radiation dose reduction device was turned on for each scan per the ALARA (As Low as Reasonably Achievable) protocol.  COMPARISON: Chest x-ray earlier same day.  FINDINGS: The thyroid is homogeneous in attenuation. No bulky mediastinal adenopathy. Mainstem bronchi is patent, however, narrowing of the right lower lobe bronchus and bronchus intermedius with limited patency concerning for compression or involvement with ill-defined opacifications adjacent of atelectatic right lower lung with confluent opacities of airspace disease versus atelectasis, however, soft tissue mass component not excluded within the limited field-of-view. Atherosclerotic nonaneurysmal abdominal aorta. Cardiac size within normal limits and without pericardial effusion demonstrating coronary calcifications. Large right pleural effusion with significantly atelectatic adjacent right lung. Left hemithorax grossly clear. Degenerative changes of the spine without aggressive osseous or soft tissue body wall lesions. The visualized portions of the upper abdomen are grossly unremarkable.      Large right pleural effusion with intervening opacifications of a largely atelectatic right lung. Narrowing and decreased patency of the right lower lobe bronchus and bronchus intermedius of adjacent compression versus bulk involvement. Opacifications in atelectatic right lower lobe may represent airspace disease versus underlying mass lesion which should be followed.  D:  04/11/2019 E:  04/11/2019  This report was finalized on 4/11/2019 12:40 PM by Dr. Josh Coronado.      Xr Chest 1 View    Result Date:  4/12/2019  EXAMINATION: XR CHEST 1 VW- 04/12/2019  INDICATION: U90-Pfsbbui effusion, not elsewhere classified; R06.03-Acute respiratory distress; J18.9-Pneumonia, unspecified organism  COMPARISON: 04/11/2019  FINDINGS: Right pleural drain remains in place. There is opacification of most of the right hemithorax, including the previously aerated right apex. Heart appears to be normal in size. Left lung remains stable with only mild patchy disease in the left base.         Right pleural drain appears to remain in place but there is still opacification of most of the right chest. Stable mild left basilar interstitial disease.  D:  04/12/2019 E:  04/12/2019       Xr Chest 1 View    Result Date: 4/11/2019  EXAMINATION: XR CHEST 1 VW- 04/11/2019  INDICATION: SOA triage protocol  COMPARISON: 01/05/2012  FINDINGS: Near-total opacification of the right hemithorax with meniscal sign present of likely moderate to large pleural effusion component with adjacent atelectasis versus airspace disease. No mediastinal shift. Status post median sternotomy and CABG. Left hemithorax grossly clear.        Near-total opacification of the right hemithorax from likely large effusion component with adjacent opacifications of atelectasis versus airspace disease without mediastinal shift.  D:  04/11/2019 E:  04/11/2019  This report was finalized on 4/11/2019 12:41 PM by Dr. Josh Coronado.      Ct Guided Chest Tube    Result Date: 4/11/2019  EXAMINATION: CT-GUIDED CHEST TUBE PLACEMENT-04/11/2019:  INDICATION: Pleural effusion; H46-Xqueujh effusion, not elsewhere classified; R06.03-Acute respiratory distress; J18.9-Pneumonia, unspecified organism.  TECHNIQUE: Limited low-dose 5 mm axial images for guidance of chest tube placement.  The radiation dose reduction device was turned on for each scan per the ALARA (As Low as Reasonably Achievable) protocol.  COMPARISON: Chest CT scan of same date.  FINDINGS: Informed written consent was obtained from the  patient prior to beginning. We discussed the chest tube placement procedure in detail including potential risks of bleeding, infection, lung collapse, needle damage to lung, and possible need for larger surgical chest drain placement in the future. Mr. Guillen indicates his understanding and agrees to right-sided chest tube placement.  Preliminary scanning of the patient shows mixed density fluid, with either a small irregular debris or hematocrit layer dependently, or possibly pleural metastatic disease. There is a large effusion with atelectasis of most of the right lung.  The skin overlying the largest accessible area of fluid away from atelectatic lung was marked, prepped and draped in sterile fashion and 1% lidocaine infiltrated into the skin and subcutaneous tissues and down to the pleural margin with a 1.5 inch and 9 cm 25-gauge needles. The ribs are very close together, and accordingly, an 8.5-Montenegrin catheter was selected rather than 10.5-Montenegrin. Under CT guidance, an 8.5-Montenegrin straight catheter was gradually advanced to the pleural fluid and upon aspiration of fluid the soft catheter was advanced over the stiffening cannula and the cannula removed. A total of 860 cc of thin, cloudy, dull reddish-orange pleural fluid was removed. A 60 cc syringe of fluid was divided between appropriate labeled containers for labs previously ordered and hand carried to the pathology lab by the technologist. When the fluid became more difficult to withdraw, the tube was sutured in place with a single 0-silk and secured in place with a Stay fix-type catheter attachment device and a three-way stopcock exchanged for a two-way stopcock and sterile drainage set. Preliminary scanning shows decreased fluid but still extensive remaining right pleural effusion and poor re-expansion of the right lung. There is no evidence of pneumothorax or hematoma. Mr. Martino tolerated the procedure well and states he is breathing much better at the  end of the procedure. The hospitalist will manage the drain from this point.      CT-guided 8.5-Filipino right chest drain placement, and therapeutic and diagnostic thoracentesis. No complications.  D:  04/11/2019 E:  04/11/2019    This report was finalized on 4/11/2019 10:15 PM by DR. Bret Julien MD.        ASSESSMENT/PLAN:    1.  Likely metastatic melanoma from the history.  Pathology still pending.  His options in the setting is likely immunotherapy and if he does carry a BRAF mutation then he would be a candidate for BRAF inhibitor with a MEK inhibitor.  Recommend appropriate staging with CAT scan of the abdomen pelvis and MRI of the brain.  Melanomas have a high rate of metastases to brain.  So we need to make sure this is clear.  I will await his pathology.  At this point the management of his chest tube is paramount.  And he may require a Pleurx drain on discharge.  Unlike in years past metastatic melanomas are still a bad prognosis though about 30% of the patient's make it 5 years.        Ninfa Vale MD    4/13/2019      Please note that portions of this note may have been completed with a voice recognition program. Efforts were made to edit the dictations, but occasionally words are mistranscribed.

## 2019-04-14 PROBLEM — C7A.8 NEUROENDOCRINE CARCINOMA OF LUNG (HCC): Status: ACTIVE | Noted: 2019-01-01

## 2019-04-14 PROBLEM — J91.0 MALIGNANT PLEURAL EFFUSION: Status: ACTIVE | Noted: 2019-01-01

## 2019-04-14 NOTE — PLAN OF CARE
Problem: Breathing Pattern Ineffective (Adult)  Goal: Anxiety/Fear Reduction  Outcome: Ongoing (interventions implemented as appropriate)      Problem: Patient Care Overview  Goal: Plan of Care Review  Outcome: Ongoing (interventions implemented as appropriate)

## 2019-04-14 NOTE — PROGRESS NOTES
[facility] Medicine Services  PROGRESS NOTE    Patient Name: Darron Martino  : 7/3/1932  MRN: [id number]    Date of Admission: 2019  Length of Stay: 3  Primary Care Physician: Duong Greene MD    Subjective   Subjective     CC:  F/u pleural effusion    HPI:  Larger bore chest tube placed yesterday with almost 3L out.  Breathing is much improved.  He voices no complaints today.    Review of Systems  Gen- No fevers, chills  CV- No chest pain, palpitations  Resp- + cough, +  dyspnea  GI- No N/V/D, abd pain    Otherwise ROS is negative except as mentioned in the HPI.    Objective   Objective     Vital Signs:   Temp:  [97.5 °F (36.4 °C)-98.2 °F (36.8 °C)] 97.6 °F (36.4 °C)  Heart Rate:  [76-97] 97  Resp:  [18-20] 20  BP: ()/(58-79) 118/65        Physical Exam:  Constitutional: more comfortable today, awake, alert  HENT: NCAT, mucous membranes moist  Respiratory: CT in place on right, improved breath sounds today.  Cardiovascular: RRR, no murmurs, rubs, or gallops  Gastrointestinal: Positive bowel sounds, soft, nontender, nondistended  Musculoskeletal: No bilateral ankle edema  Psychiatric: Appropriate affect, cooperative  Neurologic: Oriented x 3, no focal deficits  Skin: No rashes      Results Reviewed:  I have personally reviewed current lab, radiology, and data and agree.    Results from last 7 days   Lab Units 19  0401 19  0838   WBC 10*3/mm3 13.13* 19.66*   HEMOGLOBIN g/dL 15.9 16.2   HEMATOCRIT % 46.7 47.3   PLATELETS 10*3/mm3 301 344   INR   --  1.10     Results from last 7 days   Lab Units 19  0401 19  0841 19  0838   SODIUM mmol/L 128*  --  134*   POTASSIUM mmol/L 4.7  --  4.7   CHLORIDE mmol/L 94*  --  94*   CO2 mmol/L 20.0*  --  26.0   BUN mg/dL 25*  --  29*   CREATININE mg/dL 0.89  --  1.13   GLUCOSE mg/dL 97  --  109*   CALCIUM mg/dL 9.0  --  10.1   ALT (SGPT) U/L  --   --  28   AST (SGOT) U/L  --   --  40   TROPONIN I  ng/mL  --  0.00  --      Estimated Creatinine Clearance: 64.8 mL/min (by C-G formula based on SCr of 0.89 mg/dL).    No results found for: BNP    Microbiology Results Abnormal     Procedure Component Value - Date/Time    Body Fluid Culture - Body Fluid, Pleural Cavity [[id number]] Collected:  04/11/19 1414    Lab Status:  Final result Specimen:  Body Fluid from Pleural Cavity Updated:  04/14/19 0924     Body Fluid Culture No growth at 3 days     Gram Stain Moderate (3+) Red blood cells      No WBCs or organisms seen    Blood Culture - Blood, Arm, Right [[id number]] Collected:  04/11/19 1046    Lab Status:  Preliminary result Specimen:  Blood from Arm, Right Updated:  04/13/19 1130     Blood Culture No growth at 2 days    Blood Culture - Blood, Arm, Left [[id number]] Collected:  04/11/19 1015    Lab Status:  Preliminary result Specimen:  Blood from Arm, Left Updated:  04/13/19 1130     Blood Culture No growth at 2 days          Imaging Results (last 24 hours)     Procedure Component Value Units Date/Time    XR Chest 1 View [[id number]] Collected:  04/13/19 1830     Updated:  04/14/19 1035    Narrative:          EXAMINATION: XR CHEST 1 VW - 04/13/2019     INDICATION: D24-Aqsponn effusion, not elsewhere classified; R06.03-Acute  respiratory distress; J18.9-Pneumonia, unspecified organism.      COMPARISON: 04/13/2019     FINDINGS: Portable chest reveals placement of a chest tube on the right  which is larger in size when compared to the prior study. There is a  decrease seen in the size of the pleural effusion with elevation of the  right hemidiaphragm and increased markings seen within the right lung  base. Degenerative change is seen within the spine.           Impression:       Larger-sized chest tube compared to the prior study with  decrease seen in the size of the right pleural effusion. Mild increased  markings seen at the right lung base. No pneumothorax.     DICTATED:   04/13/2019  EDITED/ls :   04/13/2019       This report was finalized on 4/14/2019 10:32 AM by Dr. Екатерина Vela MD.       XR Chest 1 View [[id number]] Updated:  04/14/19 0606    XR Chest 1 View [[id number]] Collected:  04/13/19 1152     Updated:  04/13/19 1206    Narrative:          EXAMINATION: XR CHEST 1 VW - 04/13/2019     INDICATION: G70-Sqbcfqd effusion, not elsewhere classified; R06.03-Acute  respiratory distress; J18.9-Pneumonia, unspecified organism .     COMPARISON: 04/12/2019     FINDINGS: Portable chest reveals right chest tube in place with complete  opacification seen throughout the right lung. The left lung remains  clear. Degenerative changes seen within the spine.           Impression:       Right chest tube is in place with complete opacification  again seen throughout the right hemithorax. Left lung is grossly clear.     DICTATED:   04/13/2019  EDITED/ls :   04/13/2019      This report was finalized on 4/13/2019 12:03 PM by Dr. Екатерина Vela MD.             Results for orders placed in visit on 04/11/17   SCANNED - ECHOCARDIOGRAM       I have reviewed the medications:    Current Facility-Administered Medications:   •  acetaminophen (TYLENOL) tablet 650 mg, 650 mg, Oral, Q4H PRN, Verónica Gonsalves MD, 650 mg at 04/11/19 1614  •  atorvastatin (LIPITOR) tablet 10 mg, 10 mg, Oral, Nightly, Verónica Gonsalves MD, 10 mg at 04/13/19 2004  •  cefTRIAXone (ROCEPHIN) IVPB 1 g, 1 g, Intravenous, Q24H, Verónica Gonsalves MD, Last Rate: 100 mL/hr at 04/14/19 0831, 1 g at 04/14/19 0831  •  doxycycline (VIBRAMYCIN) 100 mg/100 mL 0.9% NS MBP, 100 mg, Intravenous, Q12H, Verónica Gonsalves MD, 100 mg at 04/14/19 0125  •  Magnesium Sulfate 2 gram Bolus, followed by 8 gram infusion (total Mg dose 10 grams)- Mg less than or equal to 1mg/dL, 2 g, Intravenous, PRN **OR** Magnesium Sulfate 2 gram / 50mL Infusion (GIVE X 3 BAGS TO EQUAL 6GM TOTAL DOSE) - Mg 1.1 - 1.5 mg/dl, 2 g, Intravenous, PRN **OR** Magnesium Sulfate 4 gram infusion- Mg 1.6-1.9  mg/dL, 4 g, Intravenous, PRN, Verónica Gonsalves MD  •  metoprolol succinate XL (TOPROL-XL) 24 hr tablet 50 mg, 50 mg, Oral, Daily, Verónica Gonsalves MD, 50 mg at 04/14/19 0831  •  ondansetron (ZOFRAN) tablet 4 mg, 4 mg, Oral, Q6H PRN **OR** ondansetron (ZOFRAN) injection 4 mg, 4 mg, Intravenous, Q6H PRN, Verónica Gonsalves MD  •  potassium chloride 10 mEq in 100 mL IVPB, 10 mEq, Intravenous, Q1H PRN, Verónica Gonsalves MD  •  ramipril (ALTACE) capsule 5 mg, 5 mg, Oral, Daily, Verónica Gonsalves MD, 5 mg at 04/14/19 0831  •  sennosides-docusate sodium (SENOKOT-S) 8.6-50 MG tablet 2 tablet, 2 tablet, Oral, Nightly, Verónica Gonsalves MD, 2 tablet at 04/13/19 2005  •  sodium chloride 0.9 % flush 10 mL, 10 mL, Intravenous, PRN, Layo Ventura MD  •  sodium chloride 0.9 % flush 3 mL, 3 mL, Intravenous, Q12H, Verónica Gonsalves MD, 3 mL at 04/14/19 0833  •  sodium chloride 0.9 % flush 3-10 mL, 3-10 mL, Intravenous, PRN, Verónica Gonsalves MD  •  traMADol (ULTRAM) tablet 50 mg, 50 mg, Oral, Nightly, Verónica Gonsalves MD, 50 mg at 04/13/19 2005      Assessment/Plan   Assessment / Plan     Active Hospital Problems    Diagnosis POA   • **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 male with a history of coronary artery disease status post CABG, and hypertension who presents to the emergency room with complaints of progressive shortness of breath over the last 7-10 days.  Found to have a significant right-sided pleural effusion.  Underwent a 860 cc thoracentesis with radiology on day of admission with chest tube placement.   Started on empiric antibiotics for possible pneumonia.      - appreciate Dr. Burnette assistance, larger bore chest tube placed 4/13 with good drainage.  Tube management per him.  - d/w Dr. Zaragoza.  Pre-ferrer path is consistent with neuroendocrine carcinoma.  Patient prefers to try treatment.  Plan to move to chemo floor  - continue empiric abx for possible post-obtructive PNA.  WBC elevated on admission, trending down now.    DVT Prophylaxis:  mechanical    Disposition: I expect the patient to be discharged TBD.    CODE STATUS:   Code Status and Medical Interventions:   Ordered at: 04/11/19 1638     Limited Support to NOT Include:    Intubation     Level Of Support Discussed With:    Patient     Code Status:    No CPR     Medical Interventions (Level of Support Prior to Arrest):    Limited         Electronically signed by Severino Damon MD, 04/14/19, 11:15 AM.

## 2019-04-14 NOTE — PROGRESS NOTES
* No surgery found *  POD 1 after chest tube placement     LOS: 3 days   Patient Care Team:  Duong Greene MD as PCP - General  Duong Greene MD as PCP - [facility] - [facility] Attributed    Chief complaint: Shortness of breath throat effusion    Subjective   Denies chest pain, wheezing has improved today after drainage via the chest tube    Objective    Vital Signs  Temp:  [97.5 °F (36.4 °C)-98.2 °F (36.8 °C)] 97.6 °F (36.4 °C)  Heart Rate:  [76-97] 97  Resp:  [18-20] 20  BP: ()/(58-79) 118/65    Physical Exam:   General Appearance: alert, appears stated age and cooperative   Lungs: clear bilaterally   Heart: Regular rate and rhythm        Results   Results from last 7 days   Lab Units 04/13/19  0401   WBC 10*3/mm3 13.13*   HEMOGLOBIN g/dL 15.9   HEMATOCRIT % 46.7   PLATELETS 10*3/mm3 301     Results from last 7 days   Lab Units 04/13/19  0401   SODIUM mmol/L 128*   POTASSIUM mmol/L 4.7   CHLORIDE mmol/L 94*   CO2 mmol/L 20.0*   BUN mg/dL 25*   CREATININE mg/dL 0.89   GLUCOSE mg/dL 97   CALCIUM mg/dL 9.0               Assessment      Malignant massive right pleural effusion. Possible Lung primary vs. Melanoma    Hypertension    CAD s/p MI and CABG in 1993    Melanoma resected left arm 2018    Right lung atelectasis due to large pleural effusion    Hyponatremia        Plan   Continue chest tube at the present time    Pablo العلي PA-C  04/14/19  9:41 AM

## 2019-04-14 NOTE — PROGRESS NOTES
"S: Doing reasonably well.  Still has chest tube in place.  Resting comfortably.        Past medical history, social history and family history was reviewed and unchanged from prior visit.    Review of Systems:    Review of Systems   A comprehensive 14 point review of systems was performed and was negative except as mentioned.      Medications:  The current medication list was reviewed in the EMR    ALLERGIES:    Allergies   Allergen Reactions   • Hydrocodone Unknown (See Comments)     Originally listed as \"other\" with hydrocodone/apap in the comment field.    • Norvasc [Amlodipine Besylate]          Physical Exam    VITAL SIGNS:  /65 (BP Location: Right arm, Patient Position: Sitting)   Pulse 97   Temp 97.6 °F (36.4 °C) (Oral)   Resp 20   Ht 175.3 cm (69\")   Wt 86.3 kg (190 lb 4.8 oz)   SpO2 92%   BMI 28.10 kg/m²   Temp:  [97.5 °F (36.4 °C)-98.2 °F (36.8 °C)] 97.6 °F (36.4 °C)      Performance Status: 2    Physical Exam    General: well appearing, in no acute distress  HEENT: sclera anicteric, oropharynx clear, neck is supple  Lymphatics: no cervical, supraclavicular, or axillary adenopathy  Cardiovascular: regular rate and rhythm, no murmurs, rubs or gallops, chest tube in place  Lungs: clear to auscultation bilaterally  Abdomen: soft, nontender, nondistended.  No palpable organomegaly  Extremities: no lower extremity edema  Skin: no rashes, lesions, bruising, or petechiae  Msk:  Shows no weakness of the large muscle groups  Psych: Mood is stable        RECENT LABS:    Lab Results   Component Value Date    HGB 15.9 04/13/2019    HCT 46.7 04/13/2019    MCV 90.7 04/13/2019     04/13/2019    WBC 13.13 (H) 04/13/2019    NEUTROABS 15.04 (H) 04/11/2019    LYMPHSABS 2.17 04/11/2019    MONOSABS 2.41 (H) 04/11/2019    EOSABS 0.02 04/11/2019    BASOSABS 0.02 04/11/2019       Lab Results   Component Value Date    GLUCOSE 97 04/13/2019    BUN 25 (H) 04/13/2019    CREATININE 0.89 04/13/2019     (L) " 04/13/2019    K 4.7 04/13/2019    CL 94 (L) 04/13/2019    CO2 20.0 (L) 04/13/2019    CALCIUM 9.0 04/13/2019    PROTEINTOT 7.7 04/11/2019    ALBUMIN 4.00 04/11/2019    BILITOT 0.6 04/11/2019    ALKPHOS 89 04/11/2019    AST 40 04/11/2019    ALT 28 04/11/2019     Ct Chest Without Contrast    Result Date: 4/11/2019  EXAMINATION: CT CHEST WO CONTRAST-04/11/2019:  INDICATION: SOA.  TECHNIQUE: CT chest without intravenous contrast administration.  The radiation dose reduction device was turned on for each scan per the ALARA (As Low as Reasonably Achievable) protocol.  COMPARISON: Chest x-ray earlier same day.  FINDINGS: The thyroid is homogeneous in attenuation. No bulky mediastinal adenopathy. Mainstem bronchi is patent, however, narrowing of the right lower lobe bronchus and bronchus intermedius with limited patency concerning for compression or involvement with ill-defined opacifications adjacent of atelectatic right lower lung with confluent opacities of airspace disease versus atelectasis, however, soft tissue mass component not excluded within the limited field-of-view. Atherosclerotic nonaneurysmal abdominal aorta. Cardiac size within normal limits and without pericardial effusion demonstrating coronary calcifications. Large right pleural effusion with significantly atelectatic adjacent right lung. Left hemithorax grossly clear. Degenerative changes of the spine without aggressive osseous or soft tissue body wall lesions. The visualized portions of the upper abdomen are grossly unremarkable.      Large right pleural effusion with intervening opacifications of a largely atelectatic right lung. Narrowing and decreased patency of the right lower lobe bronchus and bronchus intermedius of adjacent compression versus bulk involvement. Opacifications in atelectatic right lower lobe may represent airspace disease versus underlying mass lesion which should be followed.  D:  04/11/2019 E:  04/11/2019  This report was  finalized on 4/11/2019 12:40 PM by Dr. Josh Coronado.      Xr Chest 1 View    Result Date: 4/14/2019   EXAMINATION: XR CHEST 1 VW - 04/13/2019  INDICATION: V64-Fnnftgf effusion, not elsewhere classified; R06.03-Acute respiratory distress; J18.9-Pneumonia, unspecified organism.  COMPARISON: 04/13/2019  FINDINGS: Portable chest reveals placement of a chest tube on the right which is larger in size when compared to the prior study. There is a decrease seen in the size of the pleural effusion with elevation of the right hemidiaphragm and increased markings seen within the right lung base. Degenerative change is seen within the spine.         Larger-sized chest tube compared to the prior study with decrease seen in the size of the right pleural effusion. Mild increased markings seen at the right lung base. No pneumothorax.  DICTATED:   04/13/2019 EDITED/ls :   04/13/2019  This report was finalized on 4/14/2019 10:32 AM by Dr. Екатерина Vela MD.      Xr Chest 1 View    Result Date: 4/13/2019   EXAMINATION: XR CHEST 1 VW - 04/13/2019  INDICATION: Z13-Nwminkp effusion, not elsewhere classified; R06.03-Acute respiratory distress; J18.9-Pneumonia, unspecified organism .  COMPARISON: 04/12/2019  FINDINGS: Portable chest reveals right chest tube in place with complete opacification seen throughout the right lung. The left lung remains clear. Degenerative changes seen within the spine.         Right chest tube is in place with complete opacification again seen throughout the right hemithorax. Left lung is grossly clear.  DICTATED:   04/13/2019 EDITED/ls :   04/13/2019  This report was finalized on 4/13/2019 12:03 PM by Dr. Екатерина Vela MD.      Xr Chest 1 View    Result Date: 4/13/2019  EXAMINATION: XR CHEST 1 VW- 04/12/2019  INDICATION: T77-Unnouvu effusion, not elsewhere classified; R06.03-Acute respiratory distress; J18.9-Pneumonia, unspecified organism  COMPARISON: 04/11/2019  FINDINGS: Right pleural drain remains  in place. There is opacification of most of the right hemithorax, including the previously aerated right apex. Heart appears to be normal in size. Left lung remains stable with only mild patchy disease in the left base.         Right pleural drain appears to remain in place but there is still opacification of most of the right chest. Stable mild left basilar interstitial disease.  D:  04/12/2019 E:  04/12/2019  This report was finalized on 4/13/2019 10:21 AM by DR. Bret Julien MD.      Xr Chest 1 View    Result Date: 4/11/2019  EXAMINATION: XR CHEST 1 VW- 04/11/2019  INDICATION: SOA triage protocol  COMPARISON: 01/05/2012  FINDINGS: Near-total opacification of the right hemithorax with meniscal sign present of likely moderate to large pleural effusion component with adjacent atelectasis versus airspace disease. No mediastinal shift. Status post median sternotomy and CABG. Left hemithorax grossly clear.        Near-total opacification of the right hemithorax from likely large effusion component with adjacent opacifications of atelectasis versus airspace disease without mediastinal shift.  D:  04/11/2019 E:  04/11/2019  This report was finalized on 4/11/2019 12:41 PM by Dr. Josh Coronado.      Ct Guided Chest Tube    Result Date: 4/11/2019  EXAMINATION: CT-GUIDED CHEST TUBE PLACEMENT-04/11/2019:  INDICATION: Pleural effusion; A35-Frhvccm effusion, not elsewhere classified; R06.03-Acute respiratory distress; J18.9-Pneumonia, unspecified organism.  TECHNIQUE: Limited low-dose 5 mm axial images for guidance of chest tube placement.  The radiation dose reduction device was turned on for each scan per the ALARA (As Low as Reasonably Achievable) protocol.  COMPARISON: Chest CT scan of same date.  FINDINGS: Informed written consent was obtained from the patient prior to beginning. We discussed the chest tube placement procedure in detail including potential risks of bleeding, infection, lung collapse, needle damage to lung,  and possible need for larger surgical chest drain placement in the future. Mr. Guillen indicates his understanding and agrees to right-sided chest tube placement.  Preliminary scanning of the patient shows mixed density fluid, with either a small irregular debris or hematocrit layer dependently, or possibly pleural metastatic disease. There is a large effusion with atelectasis of most of the right lung.  The skin overlying the largest accessible area of fluid away from atelectatic lung was marked, prepped and draped in sterile fashion and 1% lidocaine infiltrated into the skin and subcutaneous tissues and down to the pleural margin with a 1.5 inch and 9 cm 25-gauge needles. The ribs are very close together, and accordingly, an 8.5-[place] catheter was selected rather than 10.5-[place]. Under CT guidance, an 8.5-[place] straight catheter was gradually advanced to the pleural fluid and upon aspiration of fluid the soft catheter was advanced over the stiffening cannula and the cannula removed. A total of 860 cc of thin, cloudy, dull reddish-orange pleural fluid was removed. A 60 cc syringe of fluid was divided between appropriate labeled containers for labs previously ordered and hand carried to the pathology lab by the technologist. When the fluid became more difficult to withdraw, the tube was sutured in place with a single 0-silk and secured in place with a Stay fix-type catheter attachment device and a three-way stopcock exchanged for a two-way stopcock and sterile drainage set. Preliminary scanning shows decreased fluid but still extensive remaining right pleural effusion and poor re-expansion of the right lung. There is no evidence of pneumothorax or hematoma. Mr. Martino tolerated the procedure well and states he is breathing much better at the end of the procedure. The hospitalist will manage the drain from this point.      CT-guided 8.5-[place]izen [place] right chest drain placement, and therapeutic and diagnostic  thoracentesis. No complications.  D:  04/11/2019 E:  04/11/2019    This report was finalized on 4/11/2019 10:15 PM by DR. Bret Julien MD.            Assessment/Plan    1.  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  I spoke with pathology yesterday who reviewed cytology.  This appears to be a neuroendocrine tumor.  Though it is difficult to say if it is small cell or not.  I think it is reasonable to get an MRI of his head along with CAT scan of his abdomen pelvis to stage them appropriately.  I would usually treat this like a small cell malignancy.  I spoke to the patient regarding his prognosis which is poor.  Also spoke to him about options if he wants to consider them including chemotherapy versus palliative process such as hospice.  He wants to undergo treatment.  I would likely treat him with carboplatin, etoposide and Tecentriq since I would approach this like a small cell cancer.  This would be considered stage IV disease.  For now we will get the imaging performed and also transfer him to floor that can provide chemotherapy if necessary.      Ninfa Vale MD  [facility]    4/14/2019     Please note that portions of this note may have been completed with a voice recognition program. Efforts were made to edit the dictations, but occasionally words are mistranscribed.

## 2019-04-14 NOTE — PLAN OF CARE
Problem: Breathing Pattern Ineffective (Adult)  Goal: Effective Oxygenation/Ventilation  Outcome: Ongoing (interventions implemented as appropriate)    Goal: Anxiety/Fear Reduction  Outcome: Ongoing (interventions implemented as appropriate)      Problem: Patient Care Overview  Goal: Plan of Care Review  Outcome: Ongoing (interventions implemented as appropriate)   04/14/19 1800   Coping/Psychosocial   Plan of Care Reviewed With patient;spouse   Plan of Care Review   Progress no change   OTHER   Outcome Summary VSS, CT remains output minimal. maintained RA. ambulated in [place]. up to chair. pt had CT of abdomen/pelvis negative for mets. MRI of brain consistent with mets. plan for chemotherapy. plan to move pt to  to begin chemo.

## 2019-04-15 NOTE — PROGRESS NOTES
[facility] Medicine Services  PROGRESS NOTE    Patient Name: Darron Martino  : 7/3/1932  MRN: [id number]    Date of Admission: 2019  Length of Stay: 4  Primary Care Physician: Duong Greene MD    Subjective   Subjective     CC:  F/u pleural effusion    HPI:  She reports he feels overall better.  However has been short of breath even at rest this morning.  His chest tube was flushed earlier today.  Was able to talk on room air but was short of breath at rest.  He reports some discomfort but does not describe pain in his right chest  Review of Systems  Gen- No fevers, chills  CV- No chest pain, palpitations  Resp- + cough, +  dyspnea  GI- No N/V/D, abd pain    Otherwise ROS is negative except as mentioned in the HPI.    Objective   Objective     Vital Signs:   Temp:  [97.7 °F (36.5 °C)-97.9 °F (36.6 °C)] 97.9 °F (36.6 °C)  Heart Rate:  [87-95] 87  Resp:  [18-24] 20  BP: (101-117)/(58-68) 102/58        Physical Exam:  Patient is alert and talkative short of breath just sitting still  Neck is without mass or JVD  Heart is Reg wo murmur  Lungs are clear on the left he has significant decreased breath sounds on the right.  Audible murmurs/home in the left upper chest   Abd is soft without HSM or mass, not tender or distended  MAEW  Skin is without rash  Neurologic exam in nonfocal   Mood is appropriate-he is attentive and conversant although either hard of hearing or has difficulty keeping up with the entirety of the conversation.  Though remains optimistic.    Results Reviewed:  I have personally reviewed current lab, radiology, and data and agree.    Results from last 7 days   Lab Units 19  0401 19  0838   WBC 10*3/mm3 13.13* 19.66*   HEMOGLOBIN g/dL 15.9 16.2   HEMATOCRIT % 46.7 47.3   PLATELETS 10*3/mm3 301 344   INR   --  1.10     Results from last 7 days   Lab Units 19  0401 19  0841 19  0838   SODIUM mmol/L 128*  --  134*   POTASSIUM  mmol/L 4.7  --  4.7   CHLORIDE mmol/L 94*  --  94*   CO2 mmol/L 20.0*  --  26.0   BUN mg/dL 25*  --  29*   CREATININE mg/dL 0.89  --  1.13   GLUCOSE mg/dL 97  --  109*   CALCIUM mg/dL 9.0  --  10.1   ALT (SGPT) U/L  --   --  28   AST (SGOT) U/L  --   --  40   TROPONIN I ng/mL  --  0.00  --      Estimated Creatinine Clearance: 64.8 mL/min (by C-G formula based on SCr of 0.89 mg/dL).    Microbiology Results Abnormal     Procedure Component Value - Date/Time    Blood Culture - Blood, Arm, Right [[id number]] Collected:  04/11/19 1046    Lab Status:  Preliminary result Specimen:  Blood from Arm, Right Updated:  04/15/19 1130     Blood Culture No growth at 4 days    Blood Culture - Blood, Arm, Left [[id number]] Collected:  04/11/19 1015    Lab Status:  Preliminary result Specimen:  Blood from Arm, Left Updated:  04/15/19 1130     Blood Culture No growth at 4 days    Body Fluid Culture - Body Fluid, Pleural Cavity [[id number]] Collected:  04/11/19 1414    Lab Status:  Final result Specimen:  Body Fluid from Pleural Cavity Updated:  04/14/19 0924     Body Fluid Culture No growth at 3 days     Gram Stain Moderate (3+) Red blood cells      No WBCs or organisms seen          Imaging Results (last 24 hours)     Procedure Component Value Units Date/Time    XR Chest 1 View [[id number]] Collected:  04/15/19 0903     Updated:  04/15/19 1153    Narrative:       EXAMINATION: XR CHEST 1 VW-      INDICATION: Status post tube thoracostomy; S82-Ymrpacq effusion, not  elsewhere classified; R06.03-Acute respiratory distress;  J18.9-Pneumonia, unspecified organism.      COMPARISON: 04/14/2019.     FINDINGS: Portable chest reveals no change in the chest tube on the  right with ill-defined opacification seen within the right lung base and  small right pleural effusion. Left lung is grossly clear. Degenerative  change is seen within the spine.           Impression:       Stable chest as above.     D:  04/15/2019  E:  04/15/2019     This report was  finalized on 4/15/2019 11:50 AM by Dr. Екатерина Vela MD.       CT Abdomen Pelvis With Contrast [[id number]] Collected:  04/14/19 1620     Updated:  04/15/19 1152    Narrative:       EXAMINATION: CT ABDOMEN/PELVIS W/CONTRAST - 04/14/2019      INDICATION: C13-Rcfbiwg effusion, not elsewhere classified; R06.03-Acute  respiratory distress; J18.9-Pneumonia, unspecified organism. Staging  lung cancer.     TECHNIQUE: Multiple axial CT imaging is obtained of the abdomen and  pelvis from the lung bases to the pubic symphysis following the  administration of intravenous and oral contrast.     The radiation dose reduction device was turned on for each scan per the  ALARA (As Low as Reasonably Achievable) protocol.     COMPARISON: NONE     FINDINGS: There is extensive abnormality involving the right lower lobe.  Consolidation is seen in the right infrahilar region extending into the  right lower lobe as well as a small right pleural effusion. The liver is  homogeneous. The spleen is unremarkable. Pancreas is unremarkable.  Kidneys and adrenal glands are within normal limits. Infrarenal  abdominal aortic aneurysm with mural wall thrombus identified, largest  AP dimension 3.9 cm. Aneurysm ends above the bifurcation. The appendix  is normal. No free fluid or free air. The gastrointestinal tract is  within normal limits. No abdominal or retroperitoneal lymphadenopathy.  No stones in the gallbladder.     PELVIS: Pelvic organs are unremarkable. The pelvic portion of the   gastrointestinal tract is within normal limits. No free fluid or free  air. No abnormal mass or fluid collections identified. Vascular  calcification seen within the pelvic vessels. The bony structures reveal  degenerative changes seen within the spine and pelvis.       Impression:       Disease identified within the right lung base with small  right pleural effusion and extensive mass density present. There is no  acute intra-abdominal or pelvic abnormality.  No metastatic disease is  seen within the abdomen or pelvis.     DICTATED:   04/14/2019  EDITED/ls :   04/14/2019      This report was finalized on 4/15/2019 11:49 AM by Dr. Екатерина Vela MD.       MRI Brain With & Without Contrast [[id number]] Collected:  04/14/19 1538     Updated:  04/15/19 1152    Narrative:       EXAMINATION: MRI BRAIN W/WO CONTRAST - 04/14/2019     INDICATION: K65-Qciimun effusion, not elsewhere classified; R06.03-Acute  respiratory distress; J18.9-Pneumonia, unspecified organism. Staging  lung cancer.     TECHNIQUE: Routine multiplanar imaging is obtained of the brain with and  without the administration of gadolinium contrast.     COMPARISON: NONE     FINDINGS: No evidence of restricted diffusion to suggest evidence of an  acute ischemic insult. Flow voids are preserved in the major  intracranial vessels. There is a tiny ring-enhancing lesion identified  within the right cerebellum measuring 5 mm in size. There are several  small areas of enhancement. One is  seen in the left frontoparietal lobe  measuring 1 mm in size. There is a small ring-enhancing lesion measuring  2 to 3 mm in size in the left basal ganglia as well as within the left  posterior parietal region. There is minimal surrounding edema. No  significant mass effect. No hemorrhage or hydrocephalus. No abnormal  extra-axial fluid collections identified.       Impression:       Multiple small ring-enhancing lesions identified scattered  throughout the parenchyma, all within the left cerebral hemisphere and  right posterior fossa. The largest lesion is 5 mm. Findings suggest  metastatic disease.     DICTATED:   04/14/2019  EDITED/ls :   04/14/2019          This report was finalized on 4/15/2019 11:49 AM by Dr. Екатерина Vela MD.       XR Chest 1 View [[id number]] Collected:  04/14/19 1115     Updated:  04/15/19 1117    Narrative:          EXAMINATION: XR CHEST 1 VW - 04/14/2019     INDICATION:  B27-Nidzjpr effusion, not  elsewhere classified;  R06.03-Acute respiratory distress; J18.9-Pneumonia, unspecified  organism.      COMPARISON: 04/13/2019     FINDINGS: Portable chest reveals chest tube placement on the right with  continued decrease seen in size of the right pleural effusion.  Ill-defined opacification seen within the right lung base. Small pleural  effusion remains. The left lung is stable. Heart is borderline enlarged.  Degenerative changes seen within the spine.           Impression:       Slight decrease seen in size of the pleural effusion on the  right with increased markings remaining at the right lung base. No  pneumothorax.     DICTATED:   04/14/2019  EDITED/ls :   04/14/2019      This report was finalized on 4/15/2019 11:14 AM by Dr. Екатерина Vela MD.             Results for orders placed in visit on 04/11/17   SCANNED - ECHOCARDIOGRAM       I have reviewed the medications:    Current Facility-Administered Medications:   •  acetaminophen (TYLENOL) tablet 650 mg, 650 mg, Oral, Q4H PRN, Verónica Gonsalves MD, 650 mg at 04/11/19 1614  •  atorvastatin (LIPITOR) tablet 10 mg, 10 mg, Oral, Nightly, Verónica Gonsalves MD, 10 mg at 04/14/19 2034  •  cefTRIAXone (ROCEPHIN) IVPB 1 g, 1 g, Intravenous, Q24H, Verónica Gonsalves MD, Last Rate: 100 mL/hr at 04/15/19 0843, 1 g at 04/15/19 0843  •  doxycycline (MONODOX) capsule 100 mg, 100 mg, Oral, Q12H, Krunal Jin, H, 100 mg at 04/15/19 1148  •  ipratropium-albuterol (DUO-NEB) nebulizer solution 3 mL, 3 mL, Nebulization, 4x Daily - RT, Verónica Lyle MD, 3 mL at 04/15/19 1312  •  Magnesium Sulfate 2 gram Bolus, followed by 8 gram infusion (total Mg dose 10 grams)- Mg less than or equal to 1mg/dL, 2 g, Intravenous, PRN **OR** Magnesium Sulfate 2 gram / 50mL Infusion (GIVE X 3 BAGS TO EQUAL 6GM TOTAL DOSE) - Mg 1.1 - 1.5 mg/dl, 2 g, Intravenous, PRN **OR** Magnesium Sulfate 4 gram infusion- Mg 1.6-1.9 mg/dL, 4 g, Intravenous, PRN, Verónica Gonsalves MD  •   metoprolol succinate XL (TOPROL-XL) 24 hr tablet 50 mg, 50 mg, Oral, Daily, Verónica Gonsalves MD, 50 mg at 04/15/19 0843  •  ondansetron (ZOFRAN) tablet 4 mg, 4 mg, Oral, Q6H PRN **OR** ondansetron (ZOFRAN) injection 4 mg, 4 mg, Intravenous, Q6H PRN, Verónica Gonsalves MD  •  polyethylene glycol 3350 powder (packet), 17 g, Oral, Daily, Severino Damon MD, 17 g at 04/14/19 2034  •  potassium chloride 10 mEq in 100 mL IVPB, 10 mEq, Intravenous, Q1H PRN, Verónica Gonsalves MD  •  ramipril (ALTACE) capsule 5 mg, 5 mg, Oral, Daily, Verónica Gonsalves MD, 5 mg at 04/15/19 0843  •  sennosides-docusate sodium (SENOKOT-S) 8.6-50 MG tablet 2 tablet, 2 tablet, Oral, Nightly, Verónica Gonsalves MD, 2 tablet at 04/14/19 2034  •  sodium chloride 0.9 % flush 10 mL, 10 mL, Intravenous, PRN, Layo Ventura MD  •  sodium chloride 0.9 % flush 3 mL, 3 mL, Intravenous, Q12H, Verónica Gonsalves MD, 3 mL at 04/15/19 0845  •  sodium chloride 0.9 % flush 3-10 mL, 3-10 mL, Intravenous, PRN, Verónica Gonsalves MD  •  traMADol (ULTRAM) tablet 50 mg, 50 mg, Oral, Nightly, Verónica Gonsalves MD, 50 mg at 04/14/19 2034      Assessment/Plan   Assessment / Plan     Active Hospital Problems    Diagnosis POA   • **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 male with a history of coronary artery disease status post CABG, and hypertension who presents to the emergency room with complaints of progressive shortness of breath over the last 7-10 days.  Found to have a significant right-sided pleural effusion.  She has had more than 4 L of fluid out of his right chest .  Started on empiric  antibiotics for possible pneumonia.      - appreciate Dr. Burnette assistance, larger bore chest tube placed 4/13 with good drainage.  Tube management per CT surgery  - d/w Dr. Zaragoza.  Pre-ferrer path is consistent with neuroendocrine carcinoma.  I discussed the findings of pathology as well as his RI findings with the patient and his wife today.  They understand stand that any treatment is palliative inature and not a curative option.  They are still eager to proceed with treatment.  - continue empiric abx for possible post-obtructive PNA.  WBC elevated on admission, trending down now.    DVT Prophylaxis:  mechanical    Disposition: I expect the patient to be discharged TBD.    CODE STATUS:   Code Status and Medical Interventions:   Ordered at: 04/11/19 1638     Limited Support to NOT Include:    Intubation     Level Of Support Discussed With:    Patient     Code Status:    No CPR     Medical Interventions (Level of Support Prior to Arrest):    Limited         Electronically signed by Verónica Lyle MD, 04/15/19, 4:18 PM.

## 2019-04-15 NOTE — PLAN OF CARE
Problem: Patient Care Overview  Goal: Plan of Care Review  Outcome: Ongoing (interventions implemented as appropriate)   04/15/19 0641   Coping/Psychosocial   Plan of Care Reviewed With patient   Plan of Care Review   Progress no change   OTHER   Outcome Summary VSS. CT remains in place to wall suction. Minimal drainage. Saturation WNL on room air. Reports managed pain levels. Will continue to monitor.

## 2019-04-15 NOTE — PROGRESS NOTES
Continued Stay Note  [facility]     Patient Name: Darron Martino  MRN: [id number]  Today's Date: 4/15/2019    Admit Date: 4/11/2019    Discharge Plan     Row Name 04/15/19 1118       Plan    Plan Comments  I met with Mr. Matrino at the bedside. He is not medically ready for discharge. Prior to admission he was independent with activities of daily living. He does not use any DME at home. He reports that he has been ambulating to the bathroom and denies needs for a PT consult. He currently has a chest tube and reports plans to move him to a different unit for initiation of chemotherapy. Case management will follow his progress and assist as needed.        Discharge Codes    No documentation.           Kirby Callahan

## 2019-04-15 NOTE — PROGRESS NOTES
"S: Still has a chest tube in place.  Otherwise doing reasonably well.  No major changes.  Denies any headaches.    Past medical history, social history and family history was reviewed and unchanged from prior visit.    Review of Systems:    Review of Systems   A comprehensive 14 point review of systems was performed and was negative except as mentioned.      Medications:  The current medication list was reviewed in the EMR    ALLERGIES:    Allergies   Allergen Reactions   • Hydrocodone Unknown (See Comments)     Originally listed as \"other\" with hydrocodone/apap in the comment field.    • Norvasc [Amlodipine Besylate]          Physical Exam    VITAL SIGNS:  /58 (BP Location: Left arm, Patient Position: Lying)   Pulse 87   Temp 97.9 °F (36.6 °C) (Oral)   Resp 20   Ht 175.3 cm (69\")   Wt 86.3 kg (190 lb 4.8 oz)   SpO2 94%   BMI 28.10 kg/m²   Temp:  [97.7 °F (36.5 °C)-97.9 °F (36.6 °C)] 97.9 °F (36.6 °C)      Performance Status: 1    Physical Exam    General: elderly appearing, in no acute distress  HEENT: sclera anicteric, oropharynx clear, neck is supple  Lymphatics: no cervical, supraclavicular, or axillary adenopathy  Cardiovascular: regular rate and rhythm, no murmurs, rubs or gallops  Lungs: clear to auscultation bilaterally, chest tube in the right  Abdomen: soft, nontender, nondistended.  No palpable organomegaly  Extremities: no lower extremity edema  Skin: no rashes, lesions, bruising, or petechiae  Msk:  Shows no weakness of the large muscle groups  Psych: Mood is stable        RECENT LABS:    Lab Results   Component Value Date    HGB 15.9 04/13/2019    HCT 46.7 04/13/2019    MCV 90.7 04/13/2019     04/13/2019    WBC 13.13 (H) 04/13/2019    NEUTROABS 15.04 (H) 04/11/2019    LYMPHSABS 2.17 04/11/2019    MONOSABS 2.41 (H) 04/11/2019    EOSABS 0.02 04/11/2019    BASOSABS 0.02 04/11/2019       Lab Results   Component Value Date    GLUCOSE 97 04/13/2019    BUN 25 (H) 04/13/2019    CREATININE " 0.89 04/13/2019     (L) 04/13/2019    K 4.7 04/13/2019    CL 94 (L) 04/13/2019    CO2 20.0 (L) 04/13/2019    CALCIUM 9.0 04/13/2019    PROTEINTOT 7.7 04/11/2019    ALBUMIN 4.00 04/11/2019    BILITOT 0.6 04/11/2019    ALKPHOS 89 04/11/2019    AST 40 04/11/2019    ALT 28 04/11/2019     Lab Results   Component Value Date    FINALDX  04/11/2019      PLEURAL FLUID:  Poorly differentiated neuroendocrine carcinoma, intermediate to large cell size.    This diagnosis was rendered by DARREN Grande Jr., M.D. at [facility]. See scanned report for full consultative opinion.       ]      Assessment/Plan    1.  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  I spoke with Dr. Yee who is planning to initiate radiotherapy probably in couple of weeks.  I think it is more important to start chemotherapy since the patient wants to proceed with therapy.  His performance status is reasonable.  I spoke to him regarding the implications of this diagnosis.  This is not a curable disease and treatment is to palliate.  I will start him on carboplatin with etoposide treating him similar to a small cell malignancy.  We discussed the side effects to be expected.  I will treat him starting tomorrow as an inpatient and plan to transition him to outpatient when his chest tube comes out.            Ninfa Vale MD  [facility]    4/15/2019     Please note that portions of this note may have been completed with a voice recognition program. Efforts were made to edit the dictations, but occasionally words are mistranscribed.

## 2019-04-15 NOTE — PROGRESS NOTES
CTS Progress Note      POD 2 s/p Right Chest tube placement     LOS: 4 days     Chief Complaint:  Right pleural effusion    Subjective  MRI of brain yesterday consistent with metastatic disease.  Plan to move to chemo floor today. On room air. VSS.      Objective    Vital Signs  Temp:  [97.6 °F (36.4 °C)-97.8 °F (36.6 °C)] 97.7 °F (36.5 °C)  Heart Rate:  [97] 97  Resp:  [18-24] 24  BP: (101-118)/(59-65) 101/59    Physical Exam:   General Appearance: alert, appears stated age and cooperative   Lungs: clear to auscultation     Heart: regular rhythm & normal rate, normal S1, S2 and no murmur, no gallop, no rub   Abdomen:  Soft, nontender, normoactive bowel sounds   CT:  45 cc / 24hours.  No air leak.     Results   Results from last 7 days   Lab Units 04/13/19  0401   WBC 10*3/mm3 13.13*   HEMOGLOBIN g/dL 15.9   HEMATOCRIT % 46.7   PLATELETS 10*3/mm3 301     Results from last 7 days   Lab Units 04/13/19  0401   SODIUM mmol/L 128*   POTASSIUM mmol/L 4.7   CHLORIDE mmol/L 94*   CO2 mmol/L 20.0*   BUN mg/dL 25*   CREATININE mg/dL 0.89   GLUCOSE mg/dL 97   CALCIUM mg/dL 9.0       Imaging Results (last 24 hours)     Procedure Component Value Units Date/Time    XR Chest 1 View [[id number]] Updated:  04/15/19 0640    CT Abdomen Pelvis With Contrast [[id number]] Collected:  04/14/19 1620     Updated:  04/14/19 1620    Narrative:       EXAMINATION: CT ABDOMEN/PELVIS W/CONTRAST - 04/14/2019      INDICATION: C81-Xxvvtgo effusion, not elsewhere classified; R06.03-Acute  respiratory distress; J18.9-Pneumonia, unspecified organism. Staging  lung cancer.     TECHNIQUE: Multiple axial CT imaging is obtained of the abdomen and  pelvis from the lung bases to the pubic symphysis following the  administration of intravenous and oral contrast.     The radiation dose reduction device was turned on for each scan per the  ALARA (As Low as Reasonably Achievable) protocol.     COMPARISON: NONE     FINDINGS: There is extensive abnormality involving  the right lower lobe.  Consolidation is seen in the right infrahilar region extending into the  right lower lobe as well as a small right pleural effusion. The liver is  homogeneous. The spleen is unremarkable. Pancreas is unremarkable.  Kidneys and adrenal glands are within normal limits. Infrarenal  abdominal aortic aneurysm with mural wall thrombus identified, largest  AP dimension 3.9 cm. Aneurysm ends above the bifurcation. The appendix  is normal. No free fluid or free air. The gastrointestinal tract is  within normal limits. No abdominal or retroperitoneal lymphadenopathy.  No stones in the gallbladder.     PELVIS: Pelvic organs are unremarkable. The pelvic portion of the   gastrointestinal tract is within normal limits. No free fluid or free  air. No abnormal mass or fluid collections identified. Vascular  calcification seen within the pelvic vessels. The bony structures reveal  degenerative changes seen within the spine and pelvis.       Impression:       Disease identified within the right lung base with small  right pleural effusion and extensive mass density present. There is no  acute intra-abdominal or pelvic abnormality. No metastatic disease is  seen within the abdomen or pelvis.     DICTATED:   04/14/2019  EDITED/ls :   04/14/2019        MRI Brain With & Without Contrast [[id number]] Collected:  04/14/19 1538     Updated:  04/14/19 1538    Narrative:       EXAMINATION: MRI BRAIN W/WO CONTRAST - 04/14/2019     INDICATION: B32-Hagkbkl effusion, not elsewhere classified; R06.03-Acute  respiratory distress; J18.9-Pneumonia, unspecified organism. Staging  lung cancer.     TECHNIQUE: Routine multiplanar imaging is obtained of the brain with and  without the administration of gadolinium contrast.     COMPARISON: NONE     FINDINGS: No evidence of restricted diffusion to suggest evidence of an  acute ischemic insult. Flow voids are preserved in the major  intracranial vessels. There is a tiny ring-enhancing  lesion identified  within the right cerebellum measuring 5 mm in size. There are several  small areas of enhancement. One is  seen in the left frontoparietal lobe  measuring 1 mm in size. There is a small ring-enhancing lesion measuring  2 to 3 mm in size in the left basal ganglia as well as within the left  posterior parietal region. There is minimal surrounding edema. No  significant mass effect. No hemorrhage or hydrocephalus. No abnormal  extra-axial fluid collections identified.       Impression:       Multiple small ring-enhancing lesions identified scattered  throughout the parenchyma, all within the left cerebral hemisphere and  right posterior fossa. The largest lesion is 5 mm. Findings suggest  metastatic disease.     DICTATED:   04/14/2019  EDITED/ls :   04/14/2019            XR Chest 1 View [[id number]] Collected:  04/14/19 1115     Updated:  04/14/19 1116    Narrative:          EXAMINATION: XR CHEST 1 VW - 04/14/2019     INDICATION:  K07-[place] effusion, not elsewhere classified;  R06.03-Acute respiratory distress; J18.9-Pneumonia, unspecified  organism.      COMPARISON: 04/13/2019     FINDINGS: Portable chest reveals chest tube placement on the right with  continued decrease seen in size of the right pleural effusion.  Ill-defined opacification seen within the right lung base. Small pleural  effusion remains. The left lung is stable. Heart is borderline enlarged.  Degenerative changes seen within the spine.           Impression:       Slight decrease seen in size of the pleural effusion on the  right with increased markings remaining at the right lung base. No  pneumothorax.     DICTATED:   04/14/2019  EDITED/ls :   04/14/2019        XR Chest 1 View [[id number]] Collected:  04/13/19 1830     Updated:  04/14/19 1035    Narrative:          EXAMINATION: XR CHEST 1 VW - 04/13/2019     INDICATION: W62-Jagtxdd effusion, not elsewhere classified; R06.03-Acute  respiratory distress; J18.9-Pneumonia, unspecified  organism.      COMPARISON: 04/13/2019     FINDINGS: Portable chest reveals placement of a chest tube on the right  which is larger in size when compared to the prior study. There is a  decrease seen in the size of the pleural effusion with elevation of the  right hemidiaphragm and increased markings seen within the right lung  base. Degenerative change is seen within the spine.           Impression:       Larger-sized chest tube compared to the prior study with  decrease seen in the size of the right pleural effusion. Mild increased  markings seen at the right lung base. No pneumothorax.     DICTATED:   04/13/2019  EDITED/ls :   04/13/2019      This report was finalized on 4/14/2019 10:32 AM by Dr. Екатерина Vela MD.             Assessment    Neuroendocrine carcinoma of lung (CMS/HCC)    Hypertension    CAD s/p MI and CABG in 1993    Malignant pleural effusion    Melanoma resected left arm 2018    Right lung atelectasis due to large pleural effusion    Hyponatremia      Plan   Continue chest tube to suction  Free fluid restriction for hyponatremia  Chemo as per Oncology  Possible PleurX catheter placement if effusion persists    Ashlyn Layne PA-C  04/15/19  7:45 AM

## 2019-04-15 NOTE — CONSULTS
CONSULTATION NOTE    NAME:      Darron Martino  :                                                          7/3/1932  DATE OF CONSULTATION:                       4/15/2019   REQUESTING PHYSICIAN:                   No ref. provider found  REASON FOR CONSULTATION:             1. Pleural effusion    2. Respiratory distress    3. Pneumonia of right lung due to infectious organism, unspecified part of lung    4. Neuroendocrine carcinoma of lung (CMS/HCC)       Thank you for requesting my services in evaluation of this pleasant individual.  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     BRIEF HISTORY:  Darron Martino  is a very pleasant 86 y.o. male  with multiple medical comorbidities who was found last year to have a melanoma involving his left forearm.  He underwent wide local excision, and did not require any additional therapy.  He now is admitted after a progressive several week history of shortness of breath, fatigue, and right sided chest pain.  He underwent imaging showing a large right sided pleural effusion, a bulk right hilar mass, and mediastinal lymphadenopathy.  He underwent placement of a thoracostomy catheter, with final cytology of that fluid revealing a poorly differentiated neuroendocrine tumor, with staining characteristics favoring a lung primary.  He has been evaluated by medical oncology who anticipates initiating chemotherapy in the next day or so.  On metastatic workup, his liver and abdomen are clear, but MRI of the brain revealed 4 small contrast enhancing lesions compatible with metastatic disease.  I am being asked to see him regarding the CNS disease.  From a symptomatic standpoint, he continues to report fatigue, cough, shortness of breath, though he states these are significantly improved since having the catheter placed.  He denies any symptoms of  "headache or nausea.    Allergies   Allergen Reactions   • Hydrocodone Unknown (See Comments)     Originally listed as \"other\" with hydrocodone/apap in the comment field.    • Norvasc [Amlodipine Besylate]        Social History     Tobacco Use   • Smoking status: Former Smoker   • Smokeless tobacco: Never Used   Substance Use Topics   • Alcohol use: Yes     Comment: occasional   • Drug use: No       Past Medical History:   Diagnosis Date   • Abnormal glucose    • Acquired hyperlipoproteinemia    • Acute bronchitis    • Allergic rhinitis    • Arteriosclerosis of coronary artery    • Cellulitis and abscess    • Cough    • Edema extremities    • Elevated PSA    • Encounter for prostate cancer screening    • Generalized osteoarthrosis, involving multiple sites    • Hypertension    • Lumbosacral spondylosis without myelopathy    • Pain in joint, pelvic region and thigh    • Preventative health care    • Right groin pain    • Transient cerebral ischemia    • Ulnar neuropathy    • Upper back pain on right side    • UTI (urinary tract infection)    • Varicose veins        family history includes Arthritis in his father; Hypertension in his mother.     Past Surgical History:   Procedure Laterality Date   • CARDIAC SURGERY     • INGUINAL HERNIA REPAIR     • LEG SURGERY          Review of Systems   Constitutional: Positive for appetite change, fatigue and unexpected weight change.   HENT:  Negative.    Eyes: Negative.    Respiratory: Positive for chest tightness, cough and shortness of breath. Negative for hemoptysis.    Cardiovascular: Positive for chest pain and leg swelling. Negative for palpitations.   Gastrointestinal: Negative.    Endocrine: Negative.    Genitourinary: Negative.     Musculoskeletal: Positive for back pain.   Skin: Negative.    Neurological: Negative.    Hematological: Bruises/bleeds easily.   Psychiatric/Behavioral: Negative.            Objective   VITAL SIGNS:   Vitals:    04/15/19 0423 04/15/19 0837 " 04/15/19 1312 04/15/19 1529   BP: 101/59 117/68  102/58   BP Location: Left arm Left arm  Left arm   Patient Position: Sitting Lying  Lying   Pulse:  95 88 87   Resp: 24 20 20 20   Temp: 97.7 °F (36.5 °C) 97.9 °F (36.6 °C)  97.9 °F (36.6 °C)   TempSrc: Oral Oral  Oral   SpO2: 90%  91% 94%   Weight:       Height:            KPS        70%    Physical Exam   Constitutional: He is oriented to person, place, and time. He appears well-developed. No distress.   Pale, sitting upright in the bed, receiving oxygen via nasal cannula   HENT:   Head: Normocephalic and atraumatic.   Mouth/Throat: Oropharynx is clear and moist.   Eyes: Conjunctivae and EOM are normal. Pupils are equal, round, and reactive to light.   Neck: Normal range of motion. Neck supple.   Cardiovascular: Normal rate and regular rhythm. Exam reveals no friction rub.   No murmur heard.  Pulmonary/Chest: Effort normal and breath sounds normal. He has no wheezes.   Coarse breath sounds bilaterally, crackles on the right with muffled breath sounds within the base   Abdominal: Soft. Bowel sounds are normal. He exhibits no distension and no mass. There is no tenderness.   Musculoskeletal: Normal range of motion. He exhibits no edema.   Lymphadenopathy:     He has no cervical adenopathy.   Neurological: He is alert and oriented to person, place, and time. He displays normal reflexes. No cranial nerve deficit or sensory deficit. He exhibits normal muscle tone. Coordination normal.   Skin: Skin is warm and dry.   Psychiatric: He has a normal mood and affect. His behavior is normal. Judgment and thought content normal.   Nursing note and vitals reviewed.    IMAGING  I have personally reviewed the relevant imaging studies, as follows:  Ct Chest Without Contrast    Result Date: 4/11/2019  EXAMINATION: CT CHEST WO CONTRAST-04/11/2019:        Large right pleural effusion with intervening opacifications of a largely atelectatic right lung. Narrowing and decreased patency  of the right lower lobe bronchus and bronchus intermedius of adjacent compression versus bulk involvement. Opacifications in atelectatic right lower lobe may represent airspace disease versus underlying mass lesion which should be followed.  D:  04/11/2019 E:  04/11/2019  This report was finalized on 4/11/2019 12:40 PM by Dr. Josh Coronado.      Mri Brain With & Without Contrast    Result Date: 4/15/2019  EXAMINATION: MRI BRAIN W/WO CONTRAST - 04/14/2019     Multiple small ring-enhancing lesions identified scattered throughout the parenchyma, all within the left cerebral hemisphere and right posterior fossa. The largest lesion is 5 mm. Findings suggest metastatic disease.  DICTATED:   04/14/2019 EDITED/ls :   04/14/2019    This report was finalized on 4/15/2019 11:49 AM by Dr. Екатерина Vela MD.      Ct Abdomen Pelvis With Contrast    Result Date: 4/15/2019  EXAMINATION: CT ABDOMEN/PELVIS W/CONTRAST - 04/14/2019       Disease identified within the right lung base with small right pleural effusion and extensive mass density present. There is no acute intra-abdominal or pelvic abnormality. No metastatic disease is seen within the abdomen or pelvis.  DICTATED:   04/14/2019 EDITED/ls :   04/14/2019  This report was finalized on 4/15/2019 11:49 AM by Dr. Екатерина Vela MD.        Ct Guided Chest Tube    Result Date: 4/11/2019  EXAMINATION: CT-GUIDED CHEST TUBE PLACEMENT-04/11/2019:      CT-guided 8.5-Greek right chest drain placement, and therapeutic and diagnostic thoracentesis. No complications.  D:  04/11/2019 E:  04/11/2019    This report was finalized on 4/11/2019 10:15 PM by DR. Bret Julien MD.        PATHOLOGY  Pleural Fluid Cytology 4/11/2019:  PLEURAL FLUID:  Poorly differentiated neuroendocrine carcinoma, intermediate to large cell size.    LABORATORY   U/L       The following portions of the patient's history were reviewed and updated as appropriate: allergies, current medications, past family  history, past medical history, past social history, past surgical history and problem list.    Assessment      IMPRESSION:  Mr. 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  He will require chemotherapy and as mentioned, it is anticipated that he will begin this during this hospitalization.  He unfortunately show 4 small ring enhancing brain lesions compatible with metastatic disease.  Assuming these are related to the neuroendocrine cancer, which is very likely, the chances of him having other microscopic disease in the brain is very high and the standard of care is to offer whole brain radiation therapy.  Ideally, we would try and complete these prior to chemotherapy, but given the fact that the bulk of disease is within the chest and he is symptomatic, I think it would be better for him to begin chemotherapy, and then I can insert the CNS radiation between his 1st and 2nd cycles.  This will consist of 30 Gy in 10 fractions of 3 Gy each.  I will follow as he is inpatient and once he gets through his initial chemotherapy, I will complete a radiation setup session and aim to have his treatments ready to begin next week as an outpatient.    Thank you for allowing me to participate in the care of this individual.    Sincerely,          Jose David Guzman MD      Errors in dictation may reflect use of voice recognition software and not all errors in transcription may have been detected prior to signing.

## 2019-04-16 NOTE — PROGRESS NOTES
"S: Still has a chest tube in place.  Otherwise doing reasonably well.  No major changes.      Past medical history, social history and family history was reviewed and unchanged from prior visit.    Review of Systems:    Review of Systems   Constitutional: Positive for fatigue.   HENT: Negative.    Eyes: Negative.    Respiratory: Positive for shortness of breath.    Cardiovascular: Negative.    Gastrointestinal: Negative.    Endocrine: Negative.    Genitourinary: Negative.    Musculoskeletal: Negative.    Skin: Negative.    Allergic/Immunologic: Negative.    Neurological: Negative.    Hematological: Negative.    Psychiatric/Behavioral: Negative.       A comprehensive 14 point review of systems was performed and was negative except as mentioned.      Medications:  The current medication list was reviewed in the EMR    ALLERGIES:    Allergies   Allergen Reactions   • Hydrocodone Unknown (See Comments)     Originally listed as \"other\" with hydrocodone/apap in the comment field.    • Norvasc [Amlodipine Besylate]          Physical Exam    VITAL SIGNS:  /61   Pulse 73   Temp 97.8 °F (36.6 °C)   Resp 18   Ht 175.3 cm (69\")   Wt 78.1 kg (172 lb 3.2 oz)   SpO2 93%   BMI 25.43 kg/m²   Temp:  [97.5 °F (36.4 °C)-97.9 °F (36.6 °C)] 97.8 °F (36.6 °C)      Performance Status: 1    Physical Exam    General: elderly appearing, in no acute distress  HEENT: sclera anicteric, oropharynx clear, neck is supple  Lymphatics: no cervical, supraclavicular, or axillary adenopathy  Cardiovascular: regular rate and rhythm, no murmurs, rubs or gallops  Lungs: clear to auscultation bilaterally, chest tube in the right  Abdomen: soft, nontender, nondistended.  No palpable organomegaly  Extremities: no lower extremity edema  Skin: no rashes, lesions, bruising, or petechiae  Msk:  Shows no weakness of the large muscle groups  Psych: Mood is stable        RECENT LABS:    Lab Results   Component Value Date    HGB 14.1 04/16/2019    HCT " 40.5 04/16/2019    MCV 87.5 04/16/2019     04/16/2019    WBC 20.59 (H) 04/16/2019    NEUTROABS 16.62 (H) 04/16/2019    LYMPHSABS 1.03 04/16/2019    MONOSABS 2.93 (H) 04/16/2019    EOSABS 0.00 04/16/2019    BASOSABS 0.01 04/16/2019       Lab Results   Component Value Date    GLUCOSE 97 04/16/2019    BUN 34 (H) 04/16/2019    CREATININE 0.82 04/16/2019     (L) 04/16/2019    K 5.6 (H) 04/16/2019    CL 95 (L) 04/16/2019    CO2 23.0 04/16/2019    CALCIUM 8.3 (L) 04/16/2019    PROTEINTOT 7.7 04/11/2019    ALBUMIN 4.00 04/11/2019    BILITOT 0.6 04/11/2019    ALKPHOS 89 04/11/2019    AST 40 04/11/2019    ALT 28 04/11/2019     Lab Results   Component Value Date    FINALDX  04/11/2019      PLEURAL FLUID:  Poorly differentiated neuroendocrine carcinoma, intermediate to large cell size.    This diagnosis was rendered by DARREN Grande Jr., M.D. at [facility]. See scanned report for full consultative opinion.           Assessment/Plan    1.  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   Plan to start him on chemotherapy today with carboplatin and etoposide.  I will likely add Tecentriq to his regimen with cycle #2.    2.  Malignant pleural effusion.  Output from this tube is fairly low.  May have to remove it today.        Ninfa Vale MD  [facility]    4/16/2019     Please note that portions of this note may have been completed with a voice recognition program. Efforts were made to edit the dictations, but occasionally words are mistranscribed.

## 2019-04-16 NOTE — PROGRESS NOTES
[facility] Medicine Services  PROGRESS NOTE    Patient Name: Darron Martino  : 7/3/1932  MRN: [id number]    Date of Admission: 2019  Length of Stay: 5  Primary Care Physician: Duong Greene MD    Subjective   Subjective     CC:  F/u pleural effusion    HPI:  Pt doing well this am, feels better than yesterday. SOA is better.    Review of Systems  Gen- No fevers, chills  CV- No chest pain, palpitations  Resp- + cough, +  dyspnea  GI- No N/V/D, abd pain    Otherwise ROS is negative except as mentioned in the HPI.    Objective   Objective     Vital Signs:   Temp:  [97.5 °F (36.4 °C)-97.9 °F (36.6 °C)] 97.8 °F (36.6 °C)  Heart Rate:  [73-88] 73  Resp:  [16-20] 18  BP: (102-122)/(54-61) 117/61        Physical Exam:  Constitutional: No acute distress, awake, alert  HENT: NCAT, mucous membranes moist  Respiratory: clear on left, poor air movement on right, right sided CT in place with minimal sanguinous output present  Cardiovascular: RRR, no murmurs, rubs, or gallops, palpable pedal pulses bilaterally  Gastrointestinal: Positive bowel sounds, soft, nontender, nondistended  Musculoskeletal: No bilateral ankle edema  Psychiatric: Appropriate affect, cooperative  Neurologic: Oriented x 3, strength symmetric in all extremities, Cranial Nerves grossly intact to confrontation, speech clear  Skin: No rashes    Results Reviewed:  I have personally reviewed current lab, radiology, and data and agree.    Results from last 7 days   Lab Units 19  04019  0838   WBC 10*3/mm3 20.59* 13.13* 19.66*   HEMOGLOBIN g/dL 14.1 15.9 16.2   HEMATOCRIT % 40.5 46.7 47.3   PLATELETS 10*3/mm3 250 301 344   INR   --   --  1.10     Results from last 7 days   Lab Units 19  0444 19  0401 19  0841 19  0838   SODIUM mmol/L 131* 128*  --  134*   POTASSIUM mmol/L 5.6* 4.7  --  4.7   CHLORIDE mmol/L 95* 94*  --  94*   CO2 mmol/L 23.0 20.0*  --  26.0   BUN  mg/dL 34* 25*  --  29*   CREATININE mg/dL 0.82 0.89  --  1.13   GLUCOSE mg/dL 97 97  --  109*   CALCIUM mg/dL 8.3* 9.0  --  10.1   ALT (SGPT) U/L  --   --   --  28   AST (SGOT) U/L  --   --   --  40   TROPONIN I ng/mL  --   --  0.00  --      Estimated Creatinine Clearance: 71.4 mL/min (by C-G formula based on SCr of 0.82 mg/dL).    Microbiology Results Abnormal     Procedure Component Value - Date/Time    Blood Culture - Blood, Arm, Right [[id number]] Collected:  04/11/19 1046    Lab Status:  Preliminary result Specimen:  Blood from Arm, Right Updated:  04/15/19 1130     Blood Culture No growth at 4 days    Blood Culture - Blood, Arm, Left [[id number]] Collected:  04/11/19 1015    Lab Status:  Preliminary result Specimen:  Blood from Arm, Left Updated:  04/15/19 1130     Blood Culture No growth at 4 days    Body Fluid Culture - Body Fluid, Pleural Cavity [[id number]] Collected:  04/11/19 1414    Lab Status:  Final result Specimen:  Body Fluid from Pleural Cavity Updated:  04/14/19 0924     Body Fluid Culture No growth at 3 days     Gram Stain Moderate (3+) Red blood cells      No WBCs or organisms seen          Imaging Results (last 24 hours)     Procedure Component Value Units Date/Time    XR Chest 1 View [[id number]] Collected:  04/16/19 0906     Updated:  04/16/19 0907    Narrative:       EXAMINATION: XR CHEST 1 VW- 04/16/2019      INDICATION: s/p tube thoracostomy; B61-Qyyycth effusion, not elsewhere  classified; R06.03-Acute respiratory distress; J18.9-Pneumonia,  unspecified organism; C7A.8-Other malignant neuroendocrine tumors      COMPARISON: 04/15/2019     FINDINGS: Small bore right pleural drain remains in place. Right basilar  atelectasis appears stable. No new pulmonary parenchymal disease is  seen. The heart and vasculature remain slightly prominent.       Impression:       Stable right basilar atelectasis, and mild pulmonary venous  hypertension.     D:  04/16/2019  E:  04/16/2019          XR Chest 1 View  [[id number]] Collected:  04/15/19 0903     Updated:  04/15/19 1153    Narrative:       EXAMINATION: XR CHEST 1 VW-      INDICATION: Status post tube thoracostomy; V93-Qyvqaov effusion, not  elsewhere classified; R06.03-Acute respiratory distress;  J18.9-Pneumonia, unspecified organism.      COMPARISON: 04/14/2019.     FINDINGS: Portable chest reveals no change in the chest tube on the  right with ill-defined opacification seen within the right lung base and  small right pleural effusion. Left lung is grossly clear. Degenerative  change is seen within the spine.           Impression:       Stable chest as above.     D:  04/15/2019  E:  04/15/2019     This report was finalized on 4/15/2019 11:50 AM by Dr. Екатерина Vela MD.       CT Abdomen Pelvis With Contrast [[id number]] Collected:  04/14/19 1620     Updated:  04/15/19 1152    Narrative:       EXAMINATION: CT ABDOMEN/PELVIS W/CONTRAST - 04/14/2019      INDICATION: V87-Jbegzkl effusion, not elsewhere classified; R06.03-Acute  respiratory distress; J18.9-Pneumonia, unspecified organism. Staging  lung cancer.     TECHNIQUE: Multiple axial CT imaging is obtained of the abdomen and  pelvis from the lung bases to the pubic symphysis following the  administration of intravenous and oral contrast.     The radiation dose reduction device was turned on for each scan per the  ALARA (As Low as Reasonably Achievable) protocol.     COMPARISON: NONE     FINDINGS: There is extensive abnormality involving the right lower lobe.  Consolidation is seen in the right infrahilar region extending into the  right lower lobe as well as a small right pleural effusion. The liver is  homogeneous. The spleen is unremarkable. Pancreas is unremarkable.  Kidneys and adrenal glands are within normal limits. Infrarenal  abdominal aortic aneurysm with mural wall thrombus identified, largest  AP dimension 3.9 cm. Aneurysm ends above the bifurcation. The appendix  is normal. No free fluid or free  air. The gastrointestinal tract is  within normal limits. No abdominal or retroperitoneal lymphadenopathy.  No stones in the gallbladder.     PELVIS: Pelvic organs are unremarkable. The pelvic portion of the   gastrointestinal tract is within normal limits. No free fluid or free  air. No abnormal mass or fluid collections identified. Vascular  calcification seen within the pelvic vessels. The bony structures reveal  degenerative changes seen within the spine and pelvis.       Impression:       Disease identified within the right lung base with small  right pleural effusion and extensive mass density present. There is no  acute intra-abdominal or pelvic abnormality. No metastatic disease is  seen within the abdomen or pelvis.     DICTATED:   04/14/2019  EDITED/ls :   04/14/2019      This report was finalized on 4/15/2019 11:49 AM by Dr. Екатерина Vela MD.       MRI Brain With & Without Contrast [[id number]] Collected:  04/14/19 1538     Updated:  04/15/19 1152    Narrative:       EXAMINATION: MRI BRAIN W/WO CONTRAST - 04/14/2019     INDICATION: L31-Wdlyind effusion, not elsewhere classified; R06.03-Acute  respiratory distress; J18.9-Pneumonia, unspecified organism. Staging  lung cancer.     TECHNIQUE: Routine multiplanar imaging is obtained of the brain with and  without the administration of gadolinium contrast.     COMPARISON: NONE     FINDINGS: No evidence of restricted diffusion to suggest evidence of an  acute ischemic insult. Flow voids are preserved in the major  intracranial vessels. There is a tiny ring-enhancing lesion identified  within the right cerebellum measuring 5 mm in size. There are several  small areas of enhancement. One is  seen in the left frontoparietal lobe  measuring 1 mm in size. There is a small ring-enhancing lesion measuring  2 to 3 mm in size in the left basal ganglia as well as within the left  posterior parietal region. There is minimal surrounding edema. No  significant mass  effect. No hemorrhage or hydrocephalus. No abnormal  extra-axial fluid collections identified.       Impression:       Multiple small ring-enhancing lesions identified scattered  throughout the parenchyma, all within the left cerebral hemisphere and  right posterior fossa. The largest lesion is 5 mm. Findings suggest  metastatic disease.     DICTATED:   04/14/2019  EDITED/ls :   04/14/2019          This report was finalized on 4/15/2019 11:49 AM by Dr. Екатерина Vela MD.             Results for orders placed in visit on 04/11/17   SCANNED - ECHOCARDIOGRAM       I have reviewed the medications:    Current Facility-Administered Medications:   •  acetaminophen (TYLENOL) tablet 650 mg, 650 mg, Oral, Q4H PRN, Verónica Gonsalves MD, 650 mg at 04/11/19 1614  •  atorvastatin (LIPITOR) tablet 10 mg, 10 mg, Oral, Nightly, Verónica Gonsalves MD, 10 mg at 04/15/19 2154  •  CARBOplatin (PARAPLATIN) 480 mg in sodium chloride 0.9 % 323 mL chemo IVPB, 480 mg, Intravenous, Once, Ninfa Vale MD  •  dexamethasone (DECADRON) IVPB 12 mg, 12 mg, Intravenous, Once, Ninfa Vale MD, Last Rate: 200 mL/hr at 04/16/19 1116, 12 mg at 04/16/19 1116  •  diphenhydrAMINE (BENADRYL) injection 50 mg, 50 mg, Intravenous, PRN, Ninfa Vale MD  •  etoposide (TOPOSAR) 160 mg in sodium chloride 0.9 % 558 mL chemo IVPB, 80 mg/m2 (Treatment Plan Recorded), Intravenous, Once, Ninfa Vale MD  •  famotidine (PEPCID) injection 20 mg, 20 mg, Intravenous, PRN, Ninfa Vale MD  •  FOSAPREPITANT IVPB (MORTEZA ONC) IVPB 150 mg 100 mL, 150 mg, Intravenous, Once, Ninfa Vale MD, Last Rate: 200 mL/hr at 04/16/19 1116, 150 mg at 04/16/19 1116  •  hydrocortisone sodium succinate (Solu-CORTEF) injection 100 mg, 100 mg, Intravenous, PRN, Ninfa Vale MD  •  ipratropium-albuterol (DUO-NEB) nebulizer solution 3 mL, 3 mL, Nebulization, 4x Daily - RT, Verónica Lyle MD, 3 mL at 04/15/19 1312  •  Magnesium  Sulfate 2 gram Bolus, followed by 8 gram infusion (total Mg dose 10 grams)- Mg less than or equal to 1mg/dL, 2 g, Intravenous, PRN **OR** Magnesium Sulfate 2 gram / 50mL Infusion (GIVE X 3 BAGS TO EQUAL 6GM TOTAL DOSE) - Mg 1.1 - 1.5 mg/dl, 2 g, Intravenous, PRN **OR** Magnesium Sulfate 4 gram infusion- Mg 1.6-1.9 mg/dL, 4 g, Intravenous, PRN, Verónica Gonsalves MD  •  metoprolol succinate XL (TOPROL-XL) 24 hr tablet 50 mg, 50 mg, Oral, Daily, Verónica Gonsalves MD, 50 mg at 04/16/19 0829  •  ondansetron (ZOFRAN) 16 mg in sodium chloride 0.9 % 50 mL IVPB, 16 mg, Intravenous, Once, Ninfa Vale MD, 16 mg at 04/16/19 1116  •  ondansetron (ZOFRAN) tablet 4 mg, 4 mg, Oral, Q6H PRN **OR** ondansetron (ZOFRAN) injection 4 mg, 4 mg, Intravenous, Q6H PRN, Verónica Gonsalves MD  •  Patiromer Sorbitex Calcium (VELTASSA) 1 packet, 8.4 g, Oral, Once, Dann Preston, [facility]  •  polyethylene glycol 3350 powder (packet), 17 g, Oral, Daily, Severino Damon MD, 17 g at 04/16/19 0829  •  potassium chloride 10 mEq in 100 mL IVPB, 10 mEq, Intravenous, Q1H PRN, Verónica Gonsalves MD  •  ramipril (ALTACE) capsule 5 mg, 5 mg, Oral, Daily, Verónica Gonsalves MD, 5 mg at 04/16/19 0829  •  sennosides-docusate sodium (SENOKOT-S) 8.6-50 MG tablet 2 tablet, 2 tablet, Oral, Nightly, Verónica Gonsalves MD, 2 tablet at 04/15/19 2154  •  sodium chloride 0.9 % flush 10 mL, 10 mL, Intravenous, PRN, Layo Ventura MD  •  sodium chloride 0.9 % flush 3 mL, 3 mL, Intravenous, Q12H, Verónica Gonsalves MD, 3 mL at 04/16/19 0930  •  sodium chloride 0.9 % flush 3-10 mL, 3-10 mL, Intravenous, PRN, Verónica Gonsalves MD  •  sodium chloride 0.9 % infusion 250 mL, 250 mL, Intravenous, Once, Ninfa Vale MD  •  traMADol (ULTRAM) tablet 50 mg, 50 mg, Oral, Nightly, Verónica Gonsalves MD, 50 mg at 04/15/19 5420      Assessment/Plan   Assessment / Plan     Active Hospital Problems    Diagnosis POA   • **Neuroendocrine carcinoma of lung (CMS/HCC)  [C7A.8] Yes   • Hyponatremia [E87.1] Yes   • Melanoma resected left arm 2018 [Z85.820] Not Applicable   • Right lung atelectasis due to large pleural effusion [J98.11] Yes   • Malignant pleural effusion [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 male with a history of coronary artery disease status post CABG, and hypertension who presents to the emergency room with complaints of progressive shortness of breath over the last 7-10 days.  Found to have a significant right-sided pleural effusion.  He has had more than 4 L of fluid out of his right chest .  Started on empiric antibiotics for possible pneumonia.      - appreciate Dr. Burnette assistance, larger bore chest tube placed 4/13 with good drainage.  Tube management per CT surgery  - d/w Dr. Zaragoza.  Pre-ferrer path is consistent with neuroendocrine carcinoma.  Starting chemo today, pt and wife understand this is palliative.   - continue empiric abx for possible post-obtructive PNA.  WBC elevated on admission, up today but likely due to demargination from steroids.   Monitor.     DVT Prophylaxis:  mechanical    Disposition: I expect the patient to be discharged TBD.    CODE STATUS:   Code Status and Medical Interventions:   Ordered at: 04/11/19 1638     Limited Support to NOT Include:    Intubation     Level Of Support Discussed With:    Patient     Code Status:    No CPR     Medical Interventions (Level of Support Prior to Arrest):    Limited         Electronically signed by Daria Winter MD, 04/16/19, 11:26 AM.

## 2019-04-16 NOTE — PLAN OF CARE
Problem: Breathing Pattern Ineffective (Adult)  Goal: Effective Oxygenation/Ventilation  Outcome: Ongoing (interventions implemented as appropriate)    Goal: Anxiety/Fear Reduction  Outcome: Ongoing (interventions implemented as appropriate)      Problem: Pneumonia (Adult)  Goal: Signs and Symptoms of Listed Potential Problems Will be Absent, Minimized or Managed (Pneumonia)  Outcome: Ongoing (interventions implemented as appropriate)      Problem: Patient Care Overview  Goal: Plan of Care Review  Outcome: Ongoing (interventions implemented as appropriate)   04/16/19 0351   Coping/Psychosocial   Plan of Care Reviewed With patient   Plan of Care Review   Progress no change   OTHER   Outcome Summary pt VSS, no c/o rested well no drainage from chest tube      Goal: Individualization and Mutuality  Outcome: Ongoing (interventions implemented as appropriate)    Goal: Discharge Needs Assessment  Outcome: Ongoing (interventions implemented as appropriate)    Goal: Interprofessional Rounds/Family Conf  Outcome: Ongoing (interventions implemented as appropriate)

## 2019-04-16 NOTE — PROGRESS NOTES
CTS Progress Note      POD 3 s/p right chest tube placement  Chief Complaint: Following for chest tube placement port/management      Subjective  In bed.  Conversant.  No complaint.  Denies shortness of breath      Objective    Physical Exam:   Vital Signs   Temp:  [97.5 °F (36.4 °C)-97.9 °F (36.6 °C)] 97.8 °F (36.6 °C)  Heart Rate:  [73-95] 73  Resp:  [16-20] 18  BP: (102-122)/(54-68) 117/61   GEN: NAD   CV: Distant but regular rate and rhythm no murmur rub or gallop    RESP: Decreased throughout all lung fields few scattered rales at the bases   ABD: Soft nontender positive bowel sounds    EXT: Warm without edema           CT Output: No chest tube drainage noted in epic and very little noted in chest tube collection device at bedside.  No air leak noted     Results     Results from last 7 days   Lab Units 04/16/19  0444   WBC 10*3/mm3 20.59*   HEMOGLOBIN g/dL 14.1   HEMATOCRIT % 40.5   PLATELETS 10*3/mm3 250     Results from last 7 days   Lab Units 04/16/19  0444   SODIUM mmol/L 131*   POTASSIUM mmol/L 5.6*   CHLORIDE mmol/L 95*   CO2 mmol/L 23.0   BUN mg/dL 34*   CREATININE mg/dL 0.82   GLUCOSE mg/dL 97   CALCIUM mg/dL 8.3*     Results from last 7 days   Lab Units 04/11/19  0838   INR  1.10   APTT seconds 34.5       CXR: Chest tube in place.  Continued basilar opacification with right pleural effusion appears little changed from yesterday's exam      Assessment/Plan       Neuroendocrine carcinoma of lung (CMS/HCC)    Hypertension    CAD s/p MI and CABG in 1993    Malignant pleural effusion    Melanoma resected left arm 2018    Right lung atelectasis due to large pleural effusion    Hyponatremia        Plan   Continue chest tube to suction and follow and document output.  Chest tube may be clogging off.  We will continue to follow for possible Pleurx placement if effusion worsens and/or continues.  Possible DC small bore chest tube soon. Patient scheduled to start chemotherapy today  04/16/19  8:26  AM

## 2019-04-17 NOTE — PROGRESS NOTES
"S: Still has a chest tube in place.  Otherwise doing reasonably well.  No major changes.   Tolerated chemo well so far.    Past medical history, social history and family history was reviewed and unchanged from prior visit.    Review of Systems:    Review of Systems   Constitutional: Positive for fatigue.   HENT: Negative.    Eyes: Negative.    Respiratory: Positive for shortness of breath.    Cardiovascular: Negative.    Gastrointestinal: Negative.    Endocrine: Negative.    Genitourinary: Negative.    Musculoskeletal: Negative.    Skin: Negative.    Allergic/Immunologic: Negative.    Neurological: Negative.    Hematological: Negative.    Psychiatric/Behavioral: Negative.       A comprehensive 14 point review of systems was performed and was negative except as mentioned.      Medications:  The current medication list was reviewed in the EMR    ALLERGIES:    Allergies   Allergen Reactions   • Hydrocodone Unknown (See Comments)     Originally listed as \"other\" with hydrocodone/apap in the comment field.    • Norvasc [Amlodipine Besylate]          Physical Exam    VITAL SIGNS:  /55   Pulse 73   Temp 97.6 °F (36.4 °C)   Resp 28   Ht 175.3 cm (69\")   Wt 78.1 kg (172 lb 3.2 oz)   SpO2 93%   BMI 25.43 kg/m²   Temp:  [97.3 °F (36.3 °C)-97.8 °F (36.6 °C)] 97.6 °F (36.4 °C)      Performance Status: 1    Physical Exam    General: elderly appearing, in no acute distress  HEENT: sclera anicteric, oropharynx clear, neck is supple  Lymphatics: no cervical, supraclavicular, or axillary adenopathy  Cardiovascular: regular rate and rhythm, no murmurs, rubs or gallops  Lungs: clear to auscultation bilaterally, chest tube in the right  Abdomen: soft, nontender, nondistended.  No palpable organomegaly  Extremities: no lower extremity edema  Skin: no rashes, lesions, bruising, or petechiae  Msk:  Shows no weakness of the large muscle groups  Psych: Mood is stable        RECENT LABS:    Lab Results   Component Value Date    " HGB 13.3 04/17/2019    HCT 38.1 04/17/2019    MCV 88.2 04/17/2019     04/17/2019    WBC 20.51 (H) 04/17/2019    NEUTROABS 16.62 (H) 04/16/2019    LYMPHSABS 1.03 04/16/2019    MONOSABS 2.93 (H) 04/16/2019    EOSABS 0.00 04/16/2019    BASOSABS 0.01 04/16/2019       Lab Results   Component Value Date    GLUCOSE 158 (H) 04/17/2019    BUN 30 (H) 04/17/2019    CREATININE 0.69 (L) 04/17/2019     (L) 04/17/2019    K 5.0 04/17/2019    CL 95 (L) 04/17/2019    CO2 21.0 (L) 04/17/2019    CALCIUM 8.6 04/17/2019    PROTEINTOT 7.7 04/11/2019    ALBUMIN 4.00 04/11/2019    BILITOT 0.6 04/11/2019    ALKPHOS 89 04/11/2019    AST 40 04/11/2019    ALT 28 04/11/2019     Lab Results   Component Value Date    FINALDX  04/11/2019      PLEURAL FLUID:  Poorly differentiated neuroendocrine carcinoma, intermediate to large cell size.    This diagnosis was rendered by DARREN Grande Jr., M.D. at [facility]. See scanned report for full consultative opinion.           Assessment/Plan    1.  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   Day 2 of chemotherapy today with etoposide.  I will likely add Tecentriq to his regimen with cycle #2. Tolerated it well. Can be discharged after chemo tomorrow to follow up with me as an outpt in 3 weeks.    2.  Malignant pleural effusion.  Output from this tube is fairly low.  Plan to remove today.        Ninfa Vale MD  [facility]    4/17/2019     Please note that portions of this note may have been completed with a voice recognition program. Efforts were made to edit the dictations, but occasionally words are mistranscribed.

## 2019-04-17 NOTE — PROGRESS NOTES
Continued Stay Note  [facility]     Patient Name: Darron Martino  MRN: [id number]  Today's Date: 4/17/2019    Admit Date: 4/11/2019    Discharge Plan     Row Name 04/17/19 1530       Plan    Plan  Home with wife    Patient/Family in Agreement with Plan  yes    Plan Comments  I met with Mr. Martino and his wife. He tells me has not had physical therapy see, so I have placed an order for this. He tells me he has a walker in the home, if needed. He was quite winded speaking with me and I let him know that if home oxygen is needed, I would help arrange. I will continue to follow and help with discharge planning needs.     Final Discharge Disposition Code  01 - home or self-care        Discharge Codes    No documentation.       Expected Discharge Date and Time     Expected Discharge Date Expected Discharge Time    Apr 19, 2019             Lisa Guillen RN

## 2019-04-17 NOTE — PROGRESS NOTES
CTS Progress Note      POD 4 s/p right chest tube placement  Chief Complaint: Started chemo      Subjective  Lying in bed.  Wife at bedside.  Conversant.  No complaint    Review of systems: Denies shortness of breath and chest pain      Objective    Physical Exam:   Vital Signs   Temp:  [97.3 °F (36.3 °C)-97.8 °F (36.6 °C)] 97.6 °F (36.4 °C)  Heart Rate:  [61-76] 73  Resp:  [16-28] 28  BP: (107-125)/(51-59) 110/55   GEN: NAD   CV: Regular rate and rhythm    RESP: Unlabored distant but clear   EXT: Warm no edema       CT Output: Not documented  in epic no air leak   Results     Results from last 7 days   Lab Units 04/17/19  0556   WBC 10*3/mm3 20.51*   HEMOGLOBIN g/dL 13.3   HEMATOCRIT % 38.1   PLATELETS 10*3/mm3 276     Results from last 7 days   Lab Units 04/17/19  0556   SODIUM mmol/L 127*   POTASSIUM mmol/L 5.0   CHLORIDE mmol/L 95*   CO2 mmol/L 21.0*   BUN mg/dL 30*   CREATININE mg/dL 0.69*   GLUCOSE mg/dL 158*   CALCIUM mg/dL 8.6     Results from last 7 days   Lab Units 04/11/19  0838   INR  1.10   APTT seconds 34.5       CXR: Similar study to yesterday with opacification of right base with a small pleural effusion small bore chest tube in place      Assessment/Plan   Malignant right pleural effusion status post chest tube placement    Neuroendocrine carcinoma of lung (CMS/HCC)    Hypertension    CAD s/p MI and CABG in 1993    Malignant pleural effusion    Melanoma resected left arm 2018    Right lung atelectasis due to large pleural effusion    Hyponatremia        Plan   Chest tube has been flushed for 3 consecutive days with little to no drainage noted in the collection device.  At this point either the effusion has evacuated as much as possible over the tube is not functioning properly.  It easily flushes again today.  Therefore we will discontinue the chest tube today and placed a Pleurx catheter in the future if recurrence occurs.  Patient will tentatively be discharged home tomorrow following chemo and  radiation therapy.  We will get another x-ray in the morning to follow-up in the event of significant worsening of right pleural effusion    AZAR Jack  04/17/19  4:18 PM

## 2019-04-17 NOTE — PLAN OF CARE
Problem: Breathing Pattern Ineffective (Adult)  Goal: Effective Oxygenation/Ventilation  Outcome: Ongoing (interventions implemented as appropriate)    Goal: Anxiety/Fear Reduction  Outcome: Ongoing (interventions implemented as appropriate)      Problem: Pneumonia (Adult)  Goal: Signs and Symptoms of Listed Potential Problems Will be Absent, Minimized or Managed (Pneumonia)  Outcome: Ongoing (interventions implemented as appropriate)      Problem: Patient Care Overview  Goal: Plan of Care Review  Outcome: Ongoing (interventions implemented as appropriate)   04/17/19 0341   Coping/Psychosocial   Plan of Care Reviewed With patient   Plan of Care Review   Progress improving   OTHER   Outcome Summary pt pain decreased in right side of chest, no output of chest tube, pt rested well, VSS     Goal: Individualization and Mutuality  Outcome: Ongoing (interventions implemented as appropriate)    Goal: Discharge Needs Assessment  Outcome: Ongoing (interventions implemented as appropriate)    Goal: Interprofessional Rounds/Family Conf  Outcome: Ongoing (interventions implemented as appropriate)

## 2019-04-17 NOTE — PROGRESS NOTES
[facility] Medicine Services  PROGRESS NOTE    Patient Name: Darron Martino  : 7/3/1932  MRN: [id number]    Date of Admission: 2019  Length of Stay: 6  Primary Care Physician: Duong Greene MD    Subjective   Subjective     CC:  F/u pleural effusion    HPI:  Sleeping comfortably in bed this morning.  Awakes to voice.  States that he didn't sleep very well last night.  Tolerated his first dose of chemo yesterday.  Breathing is some better.  He is hoping to have his chest tube discontinued. soon.      Review of Systems  Gen- No fevers, chills  CV- No chest pain, palpitations  Resp- + cough, +  Dyspnea (stable)  GI- No N/V/D, abd pain    Otherwise ROS is negative except as mentioned in the HPI.    Objective   Objective     Vital Signs:   Temp:  [97.3 °F (36.3 °C)-98.4 °F (36.9 °C)] 97.5 °F (36.4 °C)  Heart Rate:  [61-76] 76  Resp:  [16-28] 20  BP: (107-125)/(51-59) 107/51        Physical Exam:  Constitutional: No acute distress, asleep but arouses, resting in bed.   HENT: NCAT, mucous membranes moist  Respiratory: clear on left, decreased breath sounds on right, right sided CT in place with minimal sanguinous output present  Cardiovascular: RRR, no murmurs, rubs, or gallops, palpable pedal pulses bilaterally  Gastrointestinal: Positive bowel sounds, soft, nontender, nondistended  Musculoskeletal: No bilateral ankle edema  Psychiatric: Appropriate affect, cooperative  Neurologic: Oriented x 3, strength symmetric in all extremities, Cranial Nerves grossly intact to confrontation, speech clear  Skin: No rashes to exposed skin    Results Reviewed:  I have personally reviewed current lab, radiology, and data and agree.    Results from last 7 days   Lab Units 19  0556 19  0444 19  0401 19  0838   WBC 10*3/mm3 20.51* 20.59* 13.13* 19.66*   HEMOGLOBIN g/dL 13.3 14.1 15.9 16.2   HEMATOCRIT % 38.1 40.5 46.7 47.3   PLATELETS 10*3/mm3 276 250 301 344   INR    --   --   --  1.10     Results from last 7 days   Lab Units 04/17/19  0556 04/16/19  1033 04/16/19  0444 04/13/19  0401 04/11/19  0841 04/11/19  0838   SODIUM mmol/L 127*  --  131* 128*  --  134*   POTASSIUM mmol/L 5.0 5.1 5.6* 4.7  --  4.7   CHLORIDE mmol/L 95*  --  95* 94*  --  94*   CO2 mmol/L 21.0*  --  23.0 20.0*  --  26.0   BUN mg/dL 30*  --  34* 25*  --  29*   CREATININE mg/dL 0.69*  --  0.82 0.89  --  1.13   GLUCOSE mg/dL 158*  --  97 97  --  109*   CALCIUM mg/dL 8.6  --  8.3* 9.0  --  10.1   ALT (SGPT) U/L  --   --   --   --   --  28   AST (SGOT) U/L  --   --   --   --   --  40   TROPONIN I ng/mL  --   --   --   --  0.00  --      Estimated Creatinine Clearance: 73.2 mL/min (A) (by C-G formula based on SCr of 0.69 mg/dL (L)).    Microbiology Results Abnormal     Procedure Component Value - Date/Time    Blood Culture - Blood, Arm, Right [[id number]] Collected:  04/11/19 1046    Lab Status:  Final result Specimen:  Blood from Arm, Right Updated:  04/16/19 1130     Blood Culture No growth at 5 days    Blood Culture - Blood, Arm, Left [[id number]] Collected:  04/11/19 1015    Lab Status:  Final result Specimen:  Blood from Arm, Left Updated:  04/16/19 1130     Blood Culture No growth at 5 days    Body Fluid Culture - Body Fluid, Pleural Cavity [[id number]] Collected:  04/11/19 1414    Lab Status:  Final result Specimen:  Body Fluid from Pleural Cavity Updated:  04/14/19 0924     Body Fluid Culture No growth at 3 days     Gram Stain Moderate (3+) Red blood cells      No WBCs or organisms seen          Imaging Results (last 24 hours)     Procedure Component Value Units Date/Time    XR Chest 1 View [[id number]] Collected:  04/16/19 0906     Updated:  04/17/19 1126    Narrative:       EXAMINATION: XR CHEST 1 VW- 04/16/2019      INDICATION: s/p tube thoracostomy; P45-Hbjuish effusion, not elsewhere  classified; R06.03-Acute respiratory distress; J18.9-Pneumonia,  unspecified organism; C7A.8-Other malignant  neuroendocrine tumors      COMPARISON: 04/15/2019     FINDINGS: Small bore right pleural drain remains in place. Right basilar  atelectasis appears stable. No new pulmonary parenchymal disease is  seen. The heart and vasculature remain slightly prominent.       Impression:       Stable right basilar atelectasis, and mild pulmonary venous  hypertension.     D:  04/16/2019  E:  04/16/2019     This report was finalized on 4/17/2019 11:23 AM by DR. Bret Julien MD.       XR Chest 1 View [[id number]] Collected:  04/17/19 0901     Updated:  04/17/19 1117    Narrative:       EXAMINATION: XR CHEST 1 VW-      INDICATION: Status post tube thoracostomy; A22-Ezrcyox effusion, not  elsewhere classified; R06.03-Acute respiratory distress;  J18.9-Pneumonia, unspecified organism; C7A.8-Other malignant  neuroendocrine tumors.      COMPARISON: 04/16/2019.     FINDINGS: Right pleural drain remains in place, persistent hazy opacity  of the right base and either pleural thickening or some fluid along the  right lateral chest wall. No interval change is appreciated. Minimal  interstitial changes are again noted left base. No pneumothorax or other  new chest disease is seen. Heart and vasculature appear mildly  prominent.           Impression:       Right basilar atelectasis plus/minus effusion, not  significantly changed from prior study.     D:  04/17/2019  E:  04/17/2019     This report was finalized on 4/17/2019 11:14 AM by DR. Bret Julien MD.             Results for orders placed in visit on 04/11/17   SCANNED - ECHOCARDIOGRAM       I have reviewed the medications:    Current Facility-Administered Medications:   •  acetaminophen (TYLENOL) tablet 650 mg, 650 mg, Oral, Q4H PRN, Verónica Gonsalves MD, 650 mg at 04/11/19 1614  •  atorvastatin (LIPITOR) tablet 10 mg, 10 mg, Oral, Nightly, Verónica Gonsalves MD, 10 mg at 04/16/19 2138  •  diphenhydrAMINE (BENADRYL) injection 50 mg, 50 mg, Intravenous, PRN, Ninfa Vale MD  •   famotidine (PEPCID) injection 20 mg, 20 mg, Intravenous, PRN, Ninfa Vale MD  •  hydrocortisone sodium succinate (Solu-CORTEF) injection 100 mg, 100 mg, Intravenous, PRN, Ninfa Vale MD  •  ipratropium-albuterol (DUO-NEB) nebulizer solution 3 mL, 3 mL, Nebulization, 4x Daily - RT, Verónica Lyle MD, 3 mL at 04/17/19 1237  •  Magnesium Sulfate 2 gram Bolus, followed by 8 gram infusion (total Mg dose 10 grams)- Mg less than or equal to 1mg/dL, 2 g, Intravenous, PRN **OR** Magnesium Sulfate 2 gram / 50mL Infusion (GIVE X 3 BAGS TO EQUAL 6GM TOTAL DOSE) - Mg 1.1 - 1.5 mg/dl, 2 g, Intravenous, PRN **OR** Magnesium Sulfate 4 gram infusion- Mg 1.6-1.9 mg/dL, 4 g, Intravenous, PRN, Verónica Gonsalves MD  •  metoprolol succinate XL (TOPROL-XL) 24 hr tablet 50 mg, 50 mg, Oral, Daily, Verónica Gonsalves MD, 50 mg at 04/17/19 0826  •  ondansetron (ZOFRAN) tablet 4 mg, 4 mg, Oral, Q6H PRN **OR** ondansetron (ZOFRAN) injection 4 mg, 4 mg, Intravenous, Q6H PRN, Verónica Gonsalves MD  •  polyethylene glycol 3350 powder (packet), 17 g, Oral, Daily, Severino Damon MD, 17 g at 04/17/19 0827  •  potassium chloride 10 mEq in 100 mL IVPB, 10 mEq, Intravenous, Q1H PRN, Verónica Gonsalves MD  •  ramipril (ALTACE) capsule 5 mg, 5 mg, Oral, Daily, Verónica Gonsalves MD, 5 mg at 04/17/19 0826  •  sennosides-docusate sodium (SENOKOT-S) 8.6-50 MG tablet 2 tablet, 2 tablet, Oral, Nightly, Verónica Gonsalves MD, 2 tablet at 04/16/19 2138  •  sodium chloride 0.9 % flush 10 mL, 10 mL, Intravenous, PRN, Layo Ventura MD  •  sodium chloride 0.9 % flush 3 mL, 3 mL, Intravenous, Q12H, Verónica Gonsalves MD, 3 mL at 04/17/19 0952  •  sodium chloride 0.9 % flush 3-10 mL, 3-10 mL, Intravenous, PRN, Verónica Gonsalves MD  •  traMADol (ULTRAM) tablet 50 mg, 50 mg, Oral, Nightly, Verónica Gonsalves MD, 50 mg at 04/16/19 2138      Assessment/Plan   Assessment / Plan     Active Hospital Problems    Diagnosis POA   •  **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 male with a history of coronary artery disease status post CABG, and hypertension who presents to the emergency room with complaints of progressive shortness of breath over the last 7-10 days.  Found to have a significant right-sided pleural effusion.  He has had more than 4 L of fluid out of his right chest .  Started on empiric antibiotics for possible pneumonia.      - appreciate Dr. Burnette assistance, larger bore chest tube placed 4/13 with good drainage.  Tube management per CT surgery  - d/w Dr. Zaragoza.  Pre-ferrer path is consistent with neuroendocrine carcinoma.  Starting chemo 4/16.  Tolerated well. Pt and wife understand this is palliative.    - continue empiric abx for possible post-obtructive PNA.  WBC elevated on admission, remains elevated but stable.     DVT Prophylaxis:  mechanical    Disposition: I expect the patient to be discharged TBD.    CODE STATUS:   Code Status and Medical Interventions:   Ordered at: 04/11/19 1638     Limited Support to NOT Include:    Intubation     Level Of Support Discussed With:    Patient     Code Status:    No CPR     Medical Interventions (Level of Support Prior to Arrest):    Limited         Electronically signed by CLEM Garcia, 04/17/19, 2:32 PM.

## 2019-04-18 NOTE — DISCHARGE PLACEMENT REQUEST
"Darron Martino (86 y.o. Male)     Date of Birth Social Security Number Address Home Phone MRN    1932  [address] [place], [place] [id number] [phone] [id number]    Jew Marital Status          None        Admission Date Admission Type Admitting Provider Attending Provider Department, Room/Bed    19 Emergency Daria Winter MD Howard, Gabriela Kirk, MD [address], [address]/1    Discharge Date Discharge Disposition Discharge Destination         Home or Self Care              Attending Provider:  Daria Winter MD    Allergies:  Hydrocodone, Norvasc [Amlodipine Besylate]    Isolation:  None   Infection:  None   Code Status:  No CPR    Ht:  175.3 cm (69\")   Wt:  86.9 kg (191 lb 8 oz)    Admission Cmt:  None   Principal Problem:  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 Phone:  [phone]  Dept. Fax:   Date Ordered: 2019         Patient:  Darron Martino MRN:  [id number]   [address] [facility] [address] :  7/3/1932  SSN:    Phone: [phone] Sex:  M     Weight: 86.9 kg (191 lb 8 oz)         Ht Readings from Last 1 Encounters:   19 175.3 cm (69\")         Oxygen Therapy         (Order ID: [id number])    Diagnosis:  Pleural effusion (J90 [ICD-10-CM] 511.9 [ICD-9-CM])  Respiratory distress (R06.03 [ICD-10-CM] 786.09 [ICD-9-CM])  Malignant pleural effusion (J91.0 [ICD-10-CM] 511.81 [ICD-9-CM])  Atelectasis, right (J98.11 [ICD-10-CM] 518.0 [ICD-9-CM])   Quantity:  1     Delivery Modality: Nasal Cannula  Liters Per Minute: 2  Duration: Continuous  Equipment:  Oxygen Concentrator &  &  Portable Gaseous Oxygen System & Portable Oxygen Contents Gaseous &  Conserving Regulator  The face to face evaluation was performed on: 2019  Length of Need (99 Months = Lifetime): 99 Months = " Lifetime        Authorizing Provider's Phone: [phone]   Verbal Order Mode: Verbal with readback   Authorizing Provider: Daria Winter MD  Authorizing Provider's NPI: [id number]     Order Entered By: Mally Burroughs 4/18/2019  5:13 PM     Electronically signed by:           Active Insurance as of 4/11/2019     Primary Coverage     Payor Plan Insurance Group Employer/Plan Group    MEDICARE MEDICARE A & B      Payor Plan Address Payor Plan Phone Number Payor Plan Fax Number Effective Dates    [address] [phone]  7/1/1997 - None Entered    [facility] [address]       Subscriber Name Subscriber Birth Date Member ID       DARRON MARTINO 7/3/1932 [id number]           Secondary Coverage     Payor Plan Insurance Group Employer/Plan Group    AARP MED SUPP AARP HEALTH CARE OPTIONS      Payor Plan Address Payor Plan Phone Number Payor Plan Fax Number Effective Dates    [facility] [phone]  1/1/2016 - None Entered    [address]       [facility] [address]       Subscriber Name Subscriber Birth Date Member ID       DARRON MARTINO 7/3/1932 [id number]           Tertiary Coverage     Payor Plan Insurance Group Employer/Plan Group     FOR LIFE  FOR LIFE MC SUPP      Payor Plan Address Payor Plan Phone Number Payor Plan Fax Number Effective Dates    [address] [phone]  9/29/2016 - None Entered    [facility] [address]       Subscriber Name Subscriber Birth Date Member ID       DARRON MARTINO 7/3/1932 [id number]                 Emergency Contacts      (Rel.) Home Phone Work Phone Mobile Phone    Yamilet Martino (Spouse) [phone] -- --            Insurance Information                MEDICARE/MEDICARE A & B Phone: [phone]    Subscriber: Darron Martino Subscriber#: [id number]    Group#:  Precert#:         AARP MED SUPP/AARP HEALTH CARE OPTIONS Phone: [phone]    Subscriber: Darron Martino Subscriber#: [id number]    Group#:  Precert#:           FOR LIFE/ FOR LIFE  SUPP Phone: [phone]    Subscriber: Darron Martino Subscriber#: [id number]    Group#:  Precert#:              History & Physical      Verónica Gonsalves MD at 2019 10:12 AM              [facility] Medicine Services  HISTORY AND PHYSICAL    Patient Name: Darron Martino  : 7/3/1932  MRN: [id number]  Primary Care Physician: Duong Greene MD  Date of admission: 2019      Subjective   Subjective     Chief Complaint:  Shortness of breath     HPI:  Darron Martino is a 86 y.o. male with history of CAD s/p CABG () and HTN who presents with a 1 week history of progressive shortness of breath.  The shortness of breath is worse when he stands up. He has been unable to lay on his right sided due to worsening shortness of breath. He was seen by his PCP 1 week ago who felt that it was related to his heart. He reports intermittent LE edema but none recently. He also had some pain around his diaphragm. He has also been having low grade fevers up to 100.3 and dry cough.     In the ED, he was noted to be in respiratory distress with tachypnea. CXR showed large right sided effusion. ED discussed with IR who felt that IR guided thoracentesis was appropriate and patient was admitted for thoracentesis. There was also concern for CAP and he was started on doxy and rocephin.     Review of Systems   Constitutional: Positive for fever. Negative for chills.   HENT: Negative for trouble swallowing.    Eyes: Negative for photophobia.   Respiratory: Positive for cough and shortness of breath.    Cardiovascular: Negative for chest pain and leg swelling.   Gastrointestinal: Positive for abdominal pain. Negative for diarrhea and nausea.   Endocrine: Negative for polyuria.   Genitourinary: Negative for dysuria.   Musculoskeletal: Negative for gait problem.   Skin: Negative for rash.   Neurological: Negative for numbness and headaches.           Otherwise complete ROS reviewed and is negative except as mentioned in the HPI.    Personal History     Past Medical History:   Diagnosis Date   • Abnormal glucose    • Acquired hyperlipoproteinemia    • Acute bronchitis    • Allergic rhinitis    • Arteriosclerosis of coronary artery    • Cellulitis and abscess    • Cough    • Edema extremities    • Elevated PSA    • Encounter for prostate cancer screening    • Generalized osteoarthrosis, involving multiple sites    • Hypertension    • Lumbosacral spondylosis without myelopathy    • Pain in joint, pelvic region and thigh    • Preventative health care    • Right groin pain    • Transient cerebral ischemia    • Ulnar neuropathy    • Upper back pain on right side    • UTI (urinary tract infection)    • Varicose veins        Past Surgical History:   Procedure Laterality Date   • CARDIAC SURGERY     • INGUINAL HERNIA REPAIR     • LEG SURGERY         Family History: family history includes Arthritis in his father; Hypertension in his mother. Brother with CAD. Otherwise pertinent FHx was reviewed and unremarkable.     Social History:  reports that he has quit smoking. He has never used smokeless tobacco. He reports that he drinks alcohol. He reports that he does not use drugs.  Social History     Social History Narrative   • Not on file       Medications:    Available home medication information reviewed.    (Not in a hospital admission)    Allergies   Allergen Reactions   • Norvasc [Amlodipine Besylate]    • Other      Hydrocodone-acetaminophen       Objective   Objective     Vital Signs:   Temp:  [97.6 °F (36.4 °C)] 97.6 °F (36.4 °C)  Heart Rate:  [89-99] 89  Resp:  [18] 18  BP: (108-147)/(77-94) 108/80        Physical Exam   Constitutional: Awake, alert. Sitting up in bed. No acute distress  Eyes: PERRLA, sclerae anicteric, no conjunctival injection  HENT: NCAT, mucous membranes moist  Neck: Supple, no thyromegaly, no lymphadenopathy, trachea midline  Respiratory:  Decreased BS in right lung base otherwise. On 3L of oxygen but no respiratory distress. Chest tube in place.    Cardiovascular: RRR, no murmurs, rubs, or gallops, palpable pedal pulses bilaterally  Gastrointestinal: Positive bowel sounds, soft, nontender, nondistended  Musculoskeletal: No bilateral ankle edema, no clubbing or cyanosis to extremities  Psychiatric: Appropriate affect, cooperative  Neurologic: Oriented x 3, strength symmetric in all extremities, Cranial Nerves grossly intact to confrontation, speech clear  Skin: No rashes      Results Reviewed:  I have personally reviewed current lab, radiology, and data and agree.    Results from last 7 days   Lab Units 04/11/19  0838   WBC 10*3/mm3 19.66*   HEMOGLOBIN g/dL 16.2   HEMATOCRIT % 47.3   PLATELETS 10*3/mm3 344     Results from last 7 days   Lab Units 04/11/19 0841 04/11/19 0838   SODIUM mmol/L  --  134*   POTASSIUM mmol/L  --  4.7   CHLORIDE mmol/L  --  94*   CO2 mmol/L  --  26.0   BUN mg/dL  --  29*   CREATININE mg/dL  --  1.13   GLUCOSE mg/dL  --  109*   CALCIUM mg/dL  --  10.1   ALT (SGPT) U/L  --  28   AST (SGOT) U/L  --  40   TROPONIN I ng/mL 0.00  --      Estimated Creatinine Clearance: 50.8 mL/min (by C-G formula based on SCr of 1.13 mg/dL).  Brief Urine Lab Results     None        No results found for: BNP  Imaging Results (last 24 hours)     Procedure Component Value Units Date/Time    XR Chest 1 View [[id number]] Collected:  04/11/19 0950     Updated:  04/11/19 0950    Narrative:       EXAMINATION: XR CHEST 1 VW- 04/11/2019      INDICATION: SOA triage protocol      COMPARISON: 01/05/2012     FINDINGS: Near-total opacification of the right hemithorax with meniscal  sign present of likely moderate to large pleural effusion component with  adjacent atelectasis versus airspace disease. No mediastinal shift.  Status post median sternotomy and CABG. Left hemithorax grossly clear.            Impression:       Near-total opacification of the right  hemithorax from likely  large effusion component with adjacent opacifications of atelectasis  versus airspace disease without mediastinal shift.     D:  04/11/2019  E:  04/11/2019              Results for orders placed in visit on 04/11/17   SCANNED - ECHOCARDIOGRAM       Assessment/Plan   Assessment / Plan     Active Hospital Problems    Diagnosis POA   • Pleural effusion [J90] Yes   • Hypertension [I10] Yes   • Arteriosclerosis of coronary artery [I25.10] Yes     87yo M with history of HTN and CAD presents with shortness of breath. Noted to have large right sided pleural effusion and concern for pneumonia.       Right sided pleural effusion:  - Patient with significant right sided pleural effusion on presentation. Likely cause of the patients respiratory symptoms. Mildly tachypnic on presentation.   - ED provider has discussed with Dr. Julien with radiology. Recommend CT chest with out contrast which is pending and then Thoracentesis.   - IR guided thoracentesis ordered with cell count, LDH and cultures. However, per IR patient required CT guided chest tube due to plavix, this was ordered and placed this afternoon with removal of 860cc of cloudy, reddish orange pleural fluid.   - serum LDH ordered.   - Repeat CXR in the am. Pulm consult in the am for chest tube management.     Addendum:  - CT chest with atelectatic right lung and decrease patency of right LL bronchus. Opacifications in atelectatic right lower lobe may represent airspace disease versus underlying mass lesion which should be followed.  - Cytology added on to pleural fluid labs.   - Pulm consult placed for tomorrow.     CAP:  - Concern for CAP on admission, effusion likely obscuring views. History of cough, low grade fevers and elevated WBC.    - CT scan will give further characterization.   - will continue IV Roephin and Doxy.   - Sputum culture.     HTN:  - Continue home ramipril.     CAD:  - Continue ASA, statin and beta blocker.   - Holding plavix  for procedure.     DVT prophylaxis:  SCDs    CODE STATUS:    There are no questions and answers to display.       Admission Status:  I believe this patient meets INPATIENT status due to the need for care which can only be reasonably provided in an hospital setting such as possible need for aggressive/expedited ancillary services and/or consultation services, IV medications, close physician monitoring, and/or procedures.  In such, I feel patient’s risk for adverse outcomes and need for care warrant INPATIENT evaluation and predict the patient’s care encounter to likely last beyond 2 midnights.          Electronically signed by Verónica Gonsalves MD, 04/11/19, 10:12 AM.        Electronically signed by Verónica Gonsalves MD at 4/11/2019  3:53 PM       ICU Vital Signs     Row Name 04/18/19 1704 04/18/19 1635 04/18/19 1600 04/18/19 1548 04/18/19 1400       Vitals    Temp  --  --  --  97.6 °F (36.4 °C)  --    Temp src  --  --  --  Oral  --    Pulse  --  --  --  77  --    Heart Rate Source  --  --  --  Monitor  --    Resp  --  --  --  16  --    Resp Rate Source  --  --  --  Visual  --    BP  --  --  --  131/62  --    BP Location  --  --  --  Right arm  --    BP Method  --  --  --  Automatic  --    Patient Position  --  --  --  Lying  --       Oxygen Therapy    SpO2  80 %  (Abnormal)   91 %  --  94 %  --    Device (Oxygen Therapy)  room air  room air  nasal cannula  nasal cannula  nasal cannula    Flow (L/min)  --  --  2  2  2    Row Name 04/18/19 1200 04/18/19 0800 04/18/19 0746 04/18/19 0600 04/18/19 0400       Vitals    Temp  --  --  97.5 °F (36.4 °C)  --  --    Temp src  --  --  Oral  --  --    Pulse  --  --  68  --  --    Heart Rate Source  --  --  Monitor  --  --    Resp  --  --  16  --  --    Resp Rate Source  --  --  Visual  --  --    BP  --  --  139/65  --  --    BP Location  --  --  Left arm  --  --    BP Method  --  --  Automatic  --  --    Patient Position  --  --  Lying  --  --       Oxygen Therapy    SpO2  --   --  96 %  --  --    Device (Oxygen Therapy)  nasal cannula  nasal cannula  nasal cannula  nasal cannula  nasal cannula    Flow (L/min)  2  2  2  2  2    Row Name 04/18/19 0300 04/18/19 0200 04/18/19 0042 04/17/19 2358 04/17/19 2200       Height and Weight    Weight  --  --  86.9 kg (191 lb 8 oz)  --  --    Weight Method  --  --  Bed scale  --  --       Vitals    Temp  97.6 °F (36.4 °C)  --  --  --  --    Pulse  61  --  --  --  --    Resp  17  --  --  --  --    BP  130/59  --  --  --  --    Noninvasive MAP (mmHg)  84  --  --  --  --       Oxygen Therapy    SpO2  92 %  --  --  --  --    Device (Oxygen Therapy)  nasal cannula  nasal cannula  --  nasal cannula  nasal cannula    Flow (L/min)  2  2  --  2  2    Row Name 04/17/19 2028 04/17/19 2003 04/17/19 1940 04/17/19 1931 04/17/19 1800       Vitals    Temp  --  --  --  97.5 °F (36.4 °C)  --    Temp src  --  --  --  Oral  --    Pulse  --  --  --  73  --    Heart Rate Source  --  --  --  Monitor  --    Resp  --  18  --  20  --    Resp Rate Source  --  Visual  --  Visual  --    BP  --  --  --  133/63  --    BP Location  --  --  --  Right arm  --    BP Method  --  --  --  Automatic  --    Patient Position  --  --  --  Lying  --       Oxygen Therapy    SpO2  --  --  --  93 %  --    Device (Oxygen Therapy)  nasal cannula  room air  nasal cannula  nasal cannula  nasal cannula    Flow (L/min)  2  --  2  2  2

## 2019-04-18 NOTE — PROGRESS NOTES
CTS Progress Note      Chief Complaint: Awaiting discharge home      Subjective  In bed.  No complaint.    Review of systems denies chest pain positive for shortness of breath with activity      Objective    Physical Exam:   Vital Signs   Temp:  [97.5 °F (36.4 °C)-97.6 °F (36.4 °C)] 97.5 °F (36.4 °C)  Heart Rate:  [61-73] 68  Resp:  [16-28] 16  BP: (110-139)/(55-65) 139/65   GEN: NAD   CV: Regular rate and rhythm positive systolic murmur    RESP: Decreased throughout with scattered rales at the right base decreased breath sounds   ABD: Soft nontender positive bowel sounds    EXT: Warm to the touch no peripheral edema   Incision: Chest tube insertion site examined no active drainage or swelling     Results     Results from last 7 days   Lab Units 04/18/19  0804   WBC 10*3/mm3 19.35*   HEMOGLOBIN g/dL 13.4   HEMATOCRIT % 38.2   PLATELETS 10*3/mm3 263     Results from last 7 days   Lab Units 04/18/19  0804   SODIUM mmol/L 131*   POTASSIUM mmol/L 5.5*   CHLORIDE mmol/L 98   CO2 mmol/L 23.0   BUN mg/dL 30*   CREATININE mg/dL 0.56*   GLUCOSE mg/dL 124*   CALCIUM mg/dL 8.7           CXR: Right basilar opacification with pleural effusion no pneumothorax      Assessment/Plan   Malignant right pleural effusion status post chest tube placement.    Neuroendocrine carcinoma of lung (CMS/HCC)    Hypertension    CAD s/p MI and CABG in 1993    Malignant pleural effusion    Melanoma resected left arm 2018    Right lung atelectasis due to large pleural effusion    Hyponatremia        Plan   Patient scheduled to be discharged home today.  Arrangements being made for ongoing chemo and radiation.  Question with the patient regarding shortness of breath.  Instructed to call Dr. Burnette's office or 1 of his physicians in the event that this shortness of breath continues to worsen as he understands the real need for possible placement of a Pleurx catheter for chronic ongoing drainage of her current right pleural effusion.  He and his wife  understand and agree.    AZAR Jack  04/18/19  11:43 AM

## 2019-04-18 NOTE — PROGRESS NOTES
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; stage IV  Malignant pleural effusion  Hyponatremia  Right lung atelectasis due to large pleural effusion     Applicable PMH  HTN  CAD  Cellulitis  Allergic rhinitis  Hyperlipoproteinemia  Arteriosclerosis of coronary artery  Transient cerebral ischemia  Ulnarneuropathy  Former smoker  Hx of melanoma    Applicable PSxH  CABG    Other nutrition related factors/ medical tests/ procedures since admission:  (4/11) s/p thoracentesis- 860 mL removed  (4/12) TOA /dornase placed into pleural space to elicit further drainage  (4/13) s/p R tube thoracostomy insertion  (4/16) Chemotherapy initiated  (4/17) Chest tube removed    Reported/Observed/Food/Nutrition Related History:     Patient reports he has had a poor appetite ongoing for > 1 week related to feelings of fullness caused by fluid accumulation. Pt believes he has likely lost weight but is unsure of how much- he guesses around 10 lb over the past 3-4 weeks. Pt stated he is feeling better now that he has had so much fluid drained- pt still experiencing SOB.     Per Dr. Zaragoza- plan for 2nd round of chemo today and possible discharge.     Anthropometrics     Height: 175.3 cm (69\")  Last filed wt: 172 lb (Standing scale 4/16)    BMI: 25.43 kg/m2   Overweight: 25.0-29.9kg/m2     Ideal Body Weight (IBW) (kg): 73.69  Admission wt: 192 lb 3.2 oz  Method obtained: standing scale (4/11)    -pt accurate weight status difficult to determine 2' fluid accumulation in the chest cavity.     Weight Change   UBW: 190-200 lb   Weight change: 18-28 lb   % wt change: 9-14%  Time frame of weight loss: ~ 1 " year? Hard to determine due to fluid.      Labs reviewed   Yes  Results from last 7 days   Lab Units 04/18/19  0804 04/17/19  0556 04/16/19  1033 04/16/19  0444   GLUCOSE mg/dL 124* 158*  --  97   BUN mg/dL 30* 30*  --  34*   CREATININE mg/dL 0.56* 0.69*  --  0.82   SODIUM mmol/L 131* 127*  --  131*   CHLORIDE mmol/L 98 95*  --  95*   POTASSIUM mmol/L 5.5* 5.0 5.1 5.6*   ALT (SGPT) U/L 57*  --   --   --      Medications reviewed   Pertinent: altace, senokot, ultram, lipitor, decadron  PRN: pepcid, zofran    Current Nutrition Prescription     PO: Diet Regular; Cardiac    Intake: 61% of 11 meals     Nutrition Diagnosis     4/18  Problem Inadequate oral intake   Etiology Malignant pleural effusion; Cancer Dx   Signs/Symptoms PO intake: 61% of 11 meals ; pt reports poor appetite     Nutrition Intervention   1.  Follow treatment progress, Care plan reviewed  2. Encourage intake    Goal:   General: Nutrition support treatment, Improve nutrition related labs  PO: Increase intake  Additional goals: pt is consistently able to consume > or equal to 67% of meal tray.     Monitoring/Evaluation:   Per protocol, PO intake, Pertinent labs, Weight, Symptoms      Will Continue to follow per protocol      Kamilah Dallas RD  Time Spent: 45 minutes

## 2019-04-18 NOTE — PLAN OF CARE
Problem: Breathing Pattern Ineffective (Adult)  Goal: Effective Oxygenation/Ventilation  Outcome: Ongoing (interventions implemented as appropriate)    Goal: Anxiety/Fear Reduction  Outcome: Ongoing (interventions implemented as appropriate)      Problem: Pneumonia (Adult)  Goal: Signs and Symptoms of Listed Potential Problems Will be Absent, Minimized or Managed (Pneumonia)  Outcome: Ongoing (interventions implemented as appropriate)      Problem: Patient Care Overview  Goal: Plan of Care Review  Outcome: Ongoing (interventions implemented as appropriate)   04/18/19 8764   Coping/Psychosocial   Plan of Care Reviewed With patient   Plan of Care Review   Progress improving   OTHER   Outcome Summary Patient denies pain. Denies SOA. Currently on 2L NC. Wound to R back from old chest tube CDI. Slept well. Alert and oriented. VSS. Voiding (no urine noted for 4/17/19 but patient states he 'was making urine.'). Ambulating with assist of staff x 1. Patient did bath off independently. Awaiting chemo today and radiation simulation.      Goal: Individualization and Mutuality  Outcome: Ongoing (interventions implemented as appropriate)    Goal: Discharge Needs Assessment  Outcome: Ongoing (interventions implemented as appropriate)    Goal: Interprofessional Rounds/Family Conf  Outcome: Ongoing (interventions implemented as appropriate)

## 2019-04-18 NOTE — DISCHARGE SUMMARY
[facility] Medicine Services  DISCHARGE SUMMARY    Patient Name: Darron Martino  : 7/3/1932  MRN: [id number]    Date of Admission: 2019  Date of Discharge:  2019  Primary Care Physician: Duong Greene MD    Consults     Date and Time Order Name Status Description    4/15/2019 0820 Inpatient Radiation Oncology Consult Completed     2019 0848 Inpatient Cardiothoracic Surgery Consult Completed     2019 1611 Inpatient Hematology & Oncology Consult Completed     2019 0030 Inpatient Pulmonology Consult Completed           Hospital Course     Presenting Problem:   Pleural effusion [J90]  Pleural effusion [J90]    Active Hospital Problems    Diagnosis  POA   • **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          Hospital Course:  Darron Martino is a 86 y.o. male with a history of CAD s/p CABG and hypertension who presented to the ED with complaints of progressive shortness of breath over 7-10 days.  He was found to have a significant right sided pleural effusion.  He was admitted to hospital medicine services and seen in consultation by Dr. Burnette with CT surgery.  A large bore chest tube was placed and he had over 4 liters of fluid out of his right chest.  He was placed on empiric antibiotics for possible pneumonia.  His pleural fluid was sent for pathology and was consistent with neuroendocrine carcinoma.  He was seen in consultation by Dr. Zaragoza and was initiated on chemo on .  He was see in consultation by Dr. Guzman with rad/onc and has been set up for radiation therapy between chemo  treatments.  He is now medically stable and ready for discharge home after his last dose of chemo.  His chest tube has been removed by CTS and the patient has been instructed to call Dr. Burnette's office if he has recurrence of shortness of air, as he will likely need a pleurx catheter placed if his effusion returns.  The patient will follow up with Dr. Zaragoza in 3 weeks and with radiation oncology next week.        Day of Discharge     HPI:   Resting in bed with family at bedside.  No new complaints except for fatigue.  Wants to go home this afternoon.      Review of Systems  Gen- No fevers, chills  CV- No chest pain, palpitations  Resp- No cough, dyspnea  GI- No N/V/D, abd pain      Otherwise ROS is negative except as mentioned in the HPI.    Vital Signs:   Temp:  [97.5 °F (36.4 °C)-97.6 °F (36.4 °C)] 97.5 °F (36.4 °C)  Heart Rate:  [61-73] 68  Resp:  [16-28] 16  BP: (110-139)/(55-65) 139/65     Physical Exam:  Constitutional: No acute distress, awake, alert, resting in bed, family at bedside  HENT: NCAT, mucous membranes moist  Respiratory: Clear to auscultation bilaterally, respiratory effort normal on 2LNC   Cardiovascular: RRR, no murmurs, rubs, or gallops, palpable pedal pulses bilaterally  Gastrointestinal: Positive bowel sounds, soft, nontender, nondistended  Musculoskeletal: No bilateral ankle edema  Psychiatric: Appropriate affect, cooperative  Neurologic: Oriented x 3, strength symmetric in all extremities, Cranial Nerves grossly intact to confrontation, speech clear  Skin: No rashes      Pertinent  and/or Most Recent Results     Results from last 7 days   Lab Units 04/18/19  0804 04/17/19  0556 04/16/19  1033 04/16/19  0444 04/13/19  0401   WBC 10*3/mm3 19.35* 20.51*  --  20.59* 13.13*   HEMOGLOBIN g/dL 13.4 13.3  --  14.1 15.9   HEMATOCRIT % 38.2 38.1  --  40.5 46.7   PLATELETS 10*3/mm3 263 276  --  250 301   SODIUM mmol/L 131* 127*  --  131* 128*   POTASSIUM mmol/L 5.5* 5.0 5.1 5.6* 4.7   CHLORIDE mmol/L  98 95*  --  95* 94*   CO2 mmol/L 23.0 21.0*  --  23.0 20.0*   BUN mg/dL 30* 30*  --  34* 25*   CREATININE mg/dL 0.56* 0.69*  --  0.82 0.89   GLUCOSE mg/dL 124* 158*  --  97 97   CALCIUM mg/dL 8.7 8.6  --  8.3* 9.0     Results from last 7 days   Lab Units 04/18/19  0804   BILIRUBIN mg/dL 0.3   ALK PHOS U/L 111   ALT (SGPT) U/L 57*   AST (SGOT) U/L 44*           Invalid input(s): TG, LDLCALC, LDLREALC        Brief Urine Lab Results     None          Microbiology Results Abnormal     Procedure Component Value - Date/Time    Blood Culture - Blood, Arm, Right [[id number]] Collected:  04/11/19 1046    Lab Status:  Final result Specimen:  Blood from Arm, Right Updated:  04/16/19 1130     Blood Culture No growth at 5 days    Blood Culture - Blood, Arm, Left [[id number]] Collected:  04/11/19 1015    Lab Status:  Final result Specimen:  Blood from Arm, Left Updated:  04/16/19 1130     Blood Culture No growth at 5 days    Body Fluid Culture - Body Fluid, Pleural Cavity [[id number]] Collected:  04/11/19 1414    Lab Status:  Final result Specimen:  Body Fluid from Pleural Cavity Updated:  04/14/19 0924     Body Fluid Culture No growth at 3 days     Gram Stain Moderate (3+) Red blood cells      No WBCs or organisms seen          Imaging Results (all)     Procedure Component Value Units Date/Time    XR Chest 1 View [[id number]] Collected:  04/18/19 0845     Updated:  04/18/19 0858    Narrative:       EXAMINATION: XR CHEST 1 VW- 04/18/2019      INDICATION: s/p tube thoracostomy; A81-Ezlyald effusion, not elsewhere  classified; R06.03-Acute respiratory distress; J18.9-Pneumonia,  unspecified organism; C7A.8-Other malignant neuroendocrine tumors     TECHNIQUE:  Single view frontal chest     COMPARISONS:  04/17/2019     FINDINGS:  Right-sided pigtail thoracostomy tube has been removed and  there is persistent right basilar opacity and effusion. No pneumothorax.  Trace volume left pleural effusion. Sternotomy wires and CABG markers.        Impression:       Right-sided thoracostomy tube has been pulled with  persistent right pleural effusion and   no pneumothorax.     D:  04/18/2019  E:  04/18/2019     This report was finalized on 4/18/2019 8:55 AM by Romulo Garcia.       XR Chest 1 View [[id number]] Collected:  04/16/19 0906     Updated:  04/17/19 1126    Narrative:       EXAMINATION: XR CHEST 1 VW- 04/16/2019      INDICATION: s/p tube thoracostomy; Q12-Gsiucee effusion, not elsewhere  classified; R06.03-Acute respiratory distress; J18.9-Pneumonia,  unspecified organism; C7A.8-Other malignant neuroendocrine tumors      COMPARISON: 04/15/2019     FINDINGS: Small bore right pleural drain remains in place. Right basilar  atelectasis appears stable. No new pulmonary parenchymal disease is  seen. The heart and vasculature remain slightly prominent.       Impression:       Stable right basilar atelectasis, and mild pulmonary venous  hypertension.     D:  04/16/2019  E:  04/16/2019     This report was finalized on 4/17/2019 11:23 AM by DR. Bret Julien MD.       XR Chest 1 View [[id number]] Collected:  04/17/19 0901     Updated:  04/17/19 1117    Narrative:       EXAMINATION: XR CHEST 1 VW-      INDICATION: Status post tube thoracostomy; S33-Vznbans effusion, not  elsewhere classified; R06.03-Acute respiratory distress;  J18.9-Pneumonia, unspecified organism; C7A.8-Other malignant  neuroendocrine tumors.      COMPARISON: 04/16/2019.     FINDINGS: Right pleural drain remains in place, persistent hazy opacity  of the right base and either pleural thickening or some fluid along the  right lateral chest wall. No interval change is appreciated. Minimal  interstitial changes are again noted left base. No pneumothorax or other  new chest disease is seen. Heart and vasculature appear mildly  prominent.           Impression:       Right basilar atelectasis plus/minus effusion, not  significantly changed from prior study.     D:  04/17/2019  E:  04/17/2019     This report  was finalized on 4/17/2019 11:14 AM by DR. Bret Julien MD.       XR Chest 1 View [[id number]] Collected:  04/15/19 0903     Updated:  04/15/19 1153    Narrative:       EXAMINATION: XR CHEST 1 VW-      INDICATION: Status post tube thoracostomy; W72-Odrjelc effusion, not  elsewhere classified; R06.03-Acute respiratory distress;  J18.9-Pneumonia, unspecified organism.      COMPARISON: 04/14/2019.     FINDINGS: Portable chest reveals no change in the chest tube on the  right with ill-defined opacification seen within the right lung base and  small right pleural effusion. Left lung is grossly clear. Degenerative  change is seen within the spine.           Impression:       Stable chest as above.     D:  04/15/2019  E:  04/15/2019     This report was finalized on 4/15/2019 11:50 AM by Dr. Екатерина Vela MD.       CT Abdomen Pelvis With Contrast [[id number]] Collected:  04/14/19 1620     Updated:  04/15/19 1152    Narrative:       EXAMINATION: CT ABDOMEN/PELVIS W/CONTRAST - 04/14/2019      INDICATION: L62-Ldolriw effusion, not elsewhere classified; R06.03-Acute  respiratory distress; J18.9-Pneumonia, unspecified organism. Staging  lung cancer.     TECHNIQUE: Multiple axial CT imaging is obtained of the abdomen and  pelvis from the lung bases to the pubic symphysis following the  administration of intravenous and oral contrast.     The radiation dose reduction device was turned on for each scan per the  ALARA (As Low as Reasonably Achievable) protocol.     COMPARISON: NONE     FINDINGS: There is extensive abnormality involving the right lower lobe.  Consolidation is seen in the right infrahilar region extending into the  right lower lobe as well as a small right pleural effusion. The liver is  homogeneous. The spleen is unremarkable. Pancreas is unremarkable.  Kidneys and adrenal glands are within normal limits. Infrarenal  abdominal aortic aneurysm with mural wall thrombus identified, largest  AP dimension 3.9 cm.  Aneurysm ends above the bifurcation. The appendix  is normal. No free fluid or free air. The gastrointestinal tract is  within normal limits. No abdominal or retroperitoneal lymphadenopathy.  No stones in the gallbladder.     PELVIS: Pelvic organs are unremarkable. The pelvic portion of the   gastrointestinal tract is within normal limits. No free fluid or free  air. No abnormal mass or fluid collections identified. Vascular  calcification seen within the pelvic vessels. The bony structures reveal  degenerative changes seen within the spine and pelvis.       Impression:       Disease identified within the right lung base with small  right pleural effusion and extensive mass density present. There is no  acute intra-abdominal or pelvic abnormality. No metastatic disease is  seen within the abdomen or pelvis.     DICTATED:   04/14/2019  EDITED/ls :   04/14/2019      This report was finalized on 4/15/2019 11:49 AM by Dr. Екатерина Vela MD.       MRI Brain With & Without Contrast [[id number]] Collected:  04/14/19 1538     Updated:  04/15/19 1152    Narrative:       EXAMINATION: MRI BRAIN W/WO CONTRAST - 04/14/2019     INDICATION: A94-Wbewfmz effusion, not elsewhere classified; R06.03-Acute  respiratory distress; J18.9-Pneumonia, unspecified organism. Staging  lung cancer.     TECHNIQUE: Routine multiplanar imaging is obtained of the brain with and  without the administration of gadolinium contrast.     COMPARISON: NONE     FINDINGS: No evidence of restricted diffusion to suggest evidence of an  acute ischemic insult. Flow voids are preserved in the major  intracranial vessels. There is a tiny ring-enhancing lesion identified  within the right cerebellum measuring 5 mm in size. There are several  small areas of enhancement. One is  seen in the left frontoparietal lobe  measuring 1 mm in size. There is a small ring-enhancing lesion measuring  2 to 3 mm in size in the left basal ganglia as well as within the  left  posterior parietal region. There is minimal surrounding edema. No  significant mass effect. No hemorrhage or hydrocephalus. No abnormal  extra-axial fluid collections identified.       Impression:       Multiple small ring-enhancing lesions identified scattered  throughout the parenchyma, all within the left cerebral hemisphere and  right posterior fossa. The largest lesion is 5 mm. Findings suggest  metastatic disease.     DICTATED:   04/14/2019  EDITED/ls :   04/14/2019          This report was finalized on 4/15/2019 11:49 AM by Dr. Екатерина Vela MD.       XR Chest 1 View [[id number]] Collected:  04/14/19 1115     Updated:  04/15/19 1117    Narrative:          EXAMINATION: XR CHEST 1 VW - 04/14/2019     INDICATION:  Y92-Skzzotv effusion, not elsewhere classified;  R06.03-Acute respiratory distress; J18.9-Pneumonia, unspecified  organism.      COMPARISON: 04/13/2019     FINDINGS: Portable chest reveals chest tube placement on the right with  continued decrease seen in size of the right pleural effusion.  Ill-defined opacification seen within the right lung base. Small pleural  effusion remains. The left lung is stable. Heart is borderline enlarged.  Degenerative changes seen within the spine.           Impression:       Slight decrease seen in size of the pleural effusion on the  right with increased markings remaining at the right lung base. No  pneumothorax.     DICTATED:   04/14/2019  EDITED/ls :   04/14/2019      This report was finalized on 4/15/2019 11:14 AM by Dr. Екатерина Vela MD.       XR Chest 1 View [[id number]] Collected:  04/13/19 1830     Updated:  04/14/19 1035    Narrative:          EXAMINATION: XR CHEST 1 VW - 04/13/2019     INDICATION: K19-Ipozums effusion, not elsewhere classified; R06.03-Acute  respiratory distress; J18.9-Pneumonia, unspecified organism.      COMPARISON: 04/13/2019     FINDINGS: Portable chest reveals placement of a chest tube on the right  which is larger in  size when compared to the prior study. There is a  decrease seen in the size of the pleural effusion with elevation of the  right hemidiaphragm and increased markings seen within the right lung  base. Degenerative change is seen within the spine.           Impression:       Larger-sized chest tube compared to the prior study with  decrease seen in the size of the right pleural effusion. Mild increased  markings seen at the right lung base. No pneumothorax.     DICTATED:   04/13/2019  EDITED/ls :   04/13/2019      This report was finalized on 4/14/2019 10:32 AM by Dr. Екатерина Vela MD.       XR Chest 1 View [[id number]] Collected:  04/13/19 1152     Updated:  04/13/19 1206    Narrative:          EXAMINATION: XR CHEST 1 VW - 04/13/2019     INDICATION: K98-Gnidgwf effusion, not elsewhere classified; R06.03-Acute  respiratory distress; J18.9-Pneumonia, unspecified organism .     COMPARISON: 04/12/2019     FINDINGS: Portable chest reveals right chest tube in place with complete  opacification seen throughout the right lung. The left lung remains  clear. Degenerative changes seen within the spine.           Impression:       Right chest tube is in place with complete opacification  again seen throughout the right hemithorax. Left lung is grossly clear.     DICTATED:   04/13/2019  EDITED/ls :   04/13/2019      This report was finalized on 4/13/2019 12:03 PM by Dr. Екатерина Vela MD.       XR Chest 1 View [[id number]] Collected:  04/12/19 0928     Updated:  04/13/19 1024    Narrative:       EXAMINATION: XR CHEST 1 VW- 04/12/2019      INDICATION: V23-Tollgfg effusion, not elsewhere classified; R06.03-Acute  respiratory distress; J18.9-Pneumonia, unspecified organism      COMPARISON: 04/11/2019     FINDINGS: Right pleural drain remains in place. There is opacification  of most of the right hemithorax, including the previously aerated right  apex. Heart appears to be normal in size. Left lung remains stable with  only  mild patchy disease in the left base.           Impression:       Right pleural drain appears to remain in place but there is  still opacification of most of the right chest. Stable mild left basilar  interstitial disease.     D:  04/12/2019  E:  04/12/2019     This report was finalized on 4/13/2019 10:21 AM by DR. Bret Julien MD.       CT Guided Chest Tube [[id number]] Collected:  04/11/19 1523     Updated:  04/11/19 2218    Narrative:       EXAMINATION: CT-GUIDED CHEST TUBE PLACEMENT-04/11/2019:      INDICATION: Pleural effusion; U26-Mgjqedm effusion, not elsewhere  classified; R06.03-Acute respiratory distress; J18.9-Pneumonia,  unspecified organism.     TECHNIQUE: Limited low-dose 5 mm axial images for guidance of chest tube  placement.     The radiation dose reduction device was turned on for each scan per the  ALARA (As Low as Reasonably Achievable) protocol.     COMPARISON: Chest CT scan of same date.     FINDINGS: Informed written consent was obtained from the patient prior  to beginning. We discussed the chest tube placement procedure in detail  including potential risks of bleeding, infection, lung collapse, needle  damage to lung, and possible need for larger surgical chest drain  placement in the future. Mr. Guillen indicates his understanding and  agrees to right-sided chest tube placement.     Preliminary scanning of the patient shows mixed density fluid, with  either a small irregular debris or hematocrit layer dependently, or  possibly pleural metastatic disease. There is a large effusion with  atelectasis of most of the right lung.     The skin overlying the largest accessible area of fluid away from  atelectatic lung was marked, prepped and draped in sterile fashion and  1% lidocaine infiltrated into the skin and subcutaneous tissues and down  to the pleural margin with a 1.5 inch and 9 cm 25-gauge needles. The  ribs are very close together, and accordingly, an 8.5-Ecuadorean catheter  was selected  rather than 10.5-Barbadian. Under CT guidance, an 8.5-Barbadian  straight catheter was gradually advanced to the pleural fluid and upon  aspiration of fluid the soft catheter was advanced over the stiffening  cannula and the cannula removed. A total of 860 cc of thin, cloudy, dull  reddish-orange pleural fluid was removed. A 60 cc syringe of fluid was  divided between appropriate labeled containers for labs previously  ordered and hand carried to the pathology lab by the technologist. When  the fluid became more difficult to withdraw, the tube was sutured in  place with a single 0-silk and secured in place with a Stay fix-type  catheter attachment device and a three-way stopcock exchanged for a  two-way stopcock and sterile drainage set. Preliminary scanning shows  decreased fluid but still extensive remaining right pleural effusion and  poor re-expansion of the right lung. There is no evidence of  pneumothorax or hematoma. Mr. Martino tolerated the procedure well and  states he is breathing much better at the end of the procedure. The  hospitalist will manage the drain from this point.       Impression:       CT-guided 8.5-Barbadian right chest drain placement, and  therapeutic and diagnostic thoracentesis. No complications.     D:  04/11/2019  E:  04/11/2019           This report was finalized on 4/11/2019 10:15 PM by DR. Bret Julien MD.       XR Chest 1 View [[id number]] Collected:  04/11/19 0950     Updated:  04/11/19 1244    Narrative:       EXAMINATION: XR CHEST 1 VW- 04/11/2019      INDICATION: SOA triage protocol      COMPARISON: 01/05/2012     FINDINGS: Near-total opacification of the right hemithorax with meniscal  sign present of likely moderate to large pleural effusion component with  adjacent atelectasis versus airspace disease. No mediastinal shift.  Status post median sternotomy and CABG. Left hemithorax grossly clear.            Impression:       Near-total opacification of the right hemithorax from  likely  large effusion component with adjacent opacifications of atelectasis  versus airspace disease without mediastinal shift.     D:  04/11/2019  E:  04/11/2019     This report was finalized on 4/11/2019 12:41 PM by Dr. Josh Coronado.       CT Chest Without Contrast [[id number]] Collected:  04/11/19 1057     Updated:  04/11/19 1244    Narrative:       EXAMINATION: CT CHEST WO CONTRAST-04/11/2019:      INDICATION: SOA.     TECHNIQUE: CT chest without intravenous contrast administration.     The radiation dose reduction device was turned on for each scan per the  ALARA (As Low as Reasonably Achievable) protocol.     COMPARISON: Chest x-ray earlier same day.     FINDINGS: The thyroid is homogeneous in attenuation. No bulky  mediastinal adenopathy. Mainstem bronchi is patent, however, narrowing  of the right lower lobe bronchus and bronchus intermedius with limited  patency concerning for compression or involvement with ill-defined  opacifications adjacent of atelectatic right lower lung with confluent  opacities of airspace disease versus atelectasis, however, soft tissue  mass component not excluded within the limited field-of-view.  Atherosclerotic nonaneurysmal abdominal aorta. Cardiac size within  normal limits and without pericardial effusion demonstrating coronary  calcifications. Large right pleural effusion with significantly  atelectatic adjacent right lung. Left hemithorax grossly clear.  Degenerative changes of the spine without aggressive osseous or soft  tissue body wall lesions. The visualized portions of the upper abdomen  are grossly unremarkable.       Impression:       Large right pleural effusion with intervening opacifications  of a largely atelectatic right lung. Narrowing and decreased patency of  the right lower lobe bronchus and bronchus intermedius of adjacent  compression versus bulk involvement. Opacifications in atelectatic right  lower lobe may represent airspace disease versus underlying  "mass lesion  which should be followed.     D:  04/11/2019  E:  04/11/2019     This report was finalized on 4/11/2019 12:40 PM by Dr. Josh Coronado.                       Results for orders placed in visit on 04/11/17   SCANNED - ECHOCARDIOGRAM         Discharge Details        Discharge Medications      New Medications      Instructions Start Date   ondansetron 8 MG tablet  Commonly known as:  ZOFRAN   8 mg, Oral, 3 Times Daily PRN         Changes to Medications      Instructions Start Date   ramipril 5 MG capsule  Commonly known as:  ALTACE  What changed:  how much to take   5 mg, Oral, Daily         Continue These Medications      Instructions Start Date   L-Arginine 1000 MG tablet   1,000 mg, Oral, 2 Times Daily      metoprolol succinate XL 50 MG 24 hr tablet  Commonly known as:  TOPROL-XL   50 mg, Oral, Daily      MULTIVITAL PO   1 tablet, Oral, Daily      pravastatin 80 MG tablet  Commonly known as:  PRAVACHOL   80 mg, Oral, Nightly      traMADol 50 MG tablet  Commonly known as:  ULTRAM   50 mg, Oral, Nightly      TURMERIC CURCUMIN PO   1 tablet, Oral, Daily         Stop These Medications    clopidogrel 75 MG tablet  Commonly known as:  PLAVIX     ibuprofen 800 MG tablet  Commonly known as:  ADVIL,MOTRIN     spironolactone-hydrochlorothiazide 25-25 MG tablet  Commonly known as:  ALDACTAZIDE            Allergies   Allergen Reactions   • Hydrocodone Unknown (See Comments)     Originally listed as \"other\" with hydrocodone/apap in the comment field.    • Norvasc [Amlodipine Besylate]          Discharge Disposition:  Home or Self Care    Discharge Diet:  Diet Order   Procedures   • Diet Regular; Cardiac         Discharge Activity:   Activity Instructions     Activity as Tolerated              CODE STATUS:    Code Status and Medical Interventions:   Ordered at: 04/11/19 1638     Limited Support to NOT Include:    Intubation     Level Of Support Discussed With:    Patient     Code Status:    No CPR     Medical " Interventions (Level of Support Prior to Arrest):    Limited         Future Appointments   Date Time Provider Department Center   4/23/2019  3:30 PM [facility]   4/24/2019 10:45 AM [facility]   4/25/2019 10:45 AM [facility]   4/26/2019 11:00 AM [facility]   4/29/2019 10:45 AM [facility]   4/30/2019 10:45 AM [facility]   5/1/2019 10:45 AM [facility]   5/2/2019 10:45 AM [facility]   5/3/2019 10:45 AM [facility]   5/6/2019 10:45 AM [facility] MORTEZA       Additional Instructions for the Follow-ups that You Need to Schedule     CBC and Differential   Apr 16, 2019      Manual Differential:  No         Comprehensive metabolic panel   Apr 16, 2019      Discharge Follow-up with PCP   As directed       Currently Documented PCP:    Duong Greene MD    PCP Phone Number:    [phone]     Follow Up Details:  first available hospital follow up appt.         Discharge Follow-up with Specified Provider: Dr. Zaragoza; 3 Weeks   As directed      To:  Dr. Zaragoza    Follow Up:  3 Weeks         Discharge Follow-up with Specified Provider: Dr. Guzman Rad/Onc   As directed      To:  Dr. Guzman Rad/Onc    Follow Up Details:  next week for radiation therapy- please call.  I think this has already been set up.               Time Spent on Discharge:  40 minutes    Electronically signed by CLEM Garcia, 04/18/19, 2:17 PM.

## 2019-04-18 NOTE — PROGRESS NOTES
Discharge Planning Assessment  [facility]     Patient Name: Darron Martino  MRN: [id number]  Today's Date: 4/18/2019    Admit Date: 4/11/2019    Discharge Needs Assessment    No documentation.       Discharge Plan     Row Name 04/18/19 1717       Plan    Plan  update    Plan Comments  Pt to be d/c this evening and is qualifying for home oxygen with sat 80% on room air. Received pc from [facility]M and pt RNCristina to provide tank for transport home and send fax info to get it set-up in the home once he returns there. CM called SHASHI Evans and informed her [employer] would be bringing the tank to the pts room; verbalized understanding to the plan.        Destination      No service coordination in this encounter.      Durable Medical Equipment      No service coordination in this encounter.      Dialysis/Infusion      No service coordination in this encounter.      Home Medical Care      No service coordination in this encounter.      Therapy      No service coordination in this encounter.      Community Resources      No service coordination in this encounter.        Expected Discharge Date and Time     Expected Discharge Date Expected Discharge Time    Apr 18, 2019         Demographic Summary    No documentation.       Functional Status    No documentation.       Psychosocial    No documentation.       Abuse/Neglect    No documentation.       Legal    No documentation.       Substance Abuse    No documentation.       Patient Forms    No documentation.           Mally Burroughs

## 2019-04-18 NOTE — PROGRESS NOTES
Mr. Martino completed her radiation set up in simulation session this morning.  My plan is to treat his whole brain to 30 Gy in 10 fractions, and I will aim to have his treatments ready to begin next week as an outpatient.  He was given an appointment to begin on Tuesday, April 23, 2019.

## 2019-04-18 NOTE — PLAN OF CARE
Problem: Patient Care Overview  Goal: Plan of Care Review  Outcome: Ongoing (interventions implemented as appropriate)   04/18/19 1520   Coping/Psychosocial   Plan of Care Reviewed With patient   Plan of Care Review   Progress improving   OTHER   Outcome Summary Chemo day 3 - Etoposide given & tolerated well. No c/o pain or SOA, AOx4, tolerating PO diet, ambulating with assist, VSS. Patient to be discharged, all instructions reviewed.      Goal: Individualization and Mutuality  Outcome: Ongoing (interventions implemented as appropriate)    Goal: Discharge Needs Assessment  Outcome: Ongoing (interventions implemented as appropriate)    Goal: Interprofessional Rounds/Family Conf  Outcome: Ongoing (interventions implemented as appropriate)

## 2019-04-19 NOTE — OUTREACH NOTE
"BEENA call completed.  Please refer to TCM call flowsheet for call documentation.    The pt states he is doing some better today. He reports breathing easier. He admits to \"a little wet cough.\" Pt reports eating and drinkning well. Bowels are moving. He denies any fever, chills, n/v. He reports \"very little pain.\" Pt denies any questions, concerns, or needs at time of call. PCP appt for TCM is scheduled 4/24/19.  "

## 2019-04-19 NOTE — OUTREACH NOTE
Prep Survey      Responses   Facility patient discharged from?  [facility]   Is patient eligible?  Yes   Discharge diagnosis  Neuroendocrine carcinoma of lung    Does the patient have one of the following disease processes/diagnoses(primary or secondary)?  Other   Does the patient have Home health ordered?  No   Is there a DME ordered?  Yes   What DME was ordered?  Oxygen per [employer]    Prep survey completed?  Yes          Taina Roldan RN

## 2019-04-21 NOTE — OUTREACH NOTE
Medical Week 1 Survey      Responses   Facility patient discharged from?  [facility]   Does the patient have one of the following disease processes/diagnoses(primary or secondary)?  Other   Is there a successful TCM telephone encounter documented?  Yes          Marva Moyer RN

## 2019-04-25 NOTE — PROGRESS NOTES
ONC Nutrition     Diagnosis:  Neuroendocrine lung carcinoma with brain mets  Chemotherapy: Carbo / Etoposide / Tecentriq  Radiation:  Whole brain / 10 fractions    Weight 178.1 lbs / 27 lbs weight loss in an undetermined time frame / 13% weight loss / disease related malnutrition related to diagnosis, weight change, declining functionals status and alteration in oral intake    Initial visit with patient and his wife during status checks 4/23/19.  Discussed the possible side effects of chemoradiation; patient states that he is tolerating chemotherapy well; has experienced some taste changes and dry mouth; tips for management were dicussed and patient provided with written patient education.  Also talked about the importance of nutrition with diagnosis and treatment; goal for weight stabilization; importance of nutrient density and high protein.  Patient very appreciative of nutritional consultation.  Will continue to follow patient through treatment.

## 2019-04-29 NOTE — OUTREACH NOTE
Medical Week 2 Survey      Responses   Facility patient discharged from?  [facility]   Does the patient have one of the following disease processes/diagnoses(primary or secondary)?  Other   Week 2 attempt successful?  Yes   Call start time  1537   Call end time  1539   Meds reviewed with patient/caregiver?  Yes   Is the patient taking all medications as directed (includes completed medication regime)?  Yes   Has the patient kept scheduled appointments due by today?  Yes   What is the patient's perception of their health status since discharge?  Same   Week 2 Call Completed?  Yes          Yajaira Caban RN

## 2019-05-01 NOTE — PROGRESS NOTES
CHEMOTHERAPY PREPARATION    Darron Martino  [id number]  7/3/1932    Chief Complaint: chemo education     History of present illness:  Darron Martino is a 86 y.o. year old male who is here today for chemotherapy preparation and needs assessment. 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 He received cycle 1 with carboplatin and etoposide while he was in the hospital  4/16/2019.     Oncology History:    Oncology/Hematology History    1.  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  2.  Plan for palliative WBRT starting 4/23/2019          Neuroendocrine carcinoma of lung (CMS/HCC)    4/14/2019 Initial Diagnosis     Neuroendocrine carcinoma of lung (CMS/HCC)         4/16/2019 -  Chemotherapy     OP LUNG Etoposide / CARBOplatin AUC=5              Past Medical History:   Diagnosis Date   • Abnormal glucose    • Acquired hyperlipoproteinemia    • Acute bronchitis    • Allergic rhinitis    • Arteriosclerosis of coronary artery    • Cellulitis and abscess    • Cough    • Edema extremities    • Elevated PSA    • Encounter for prostate cancer screening    • Generalized osteoarthrosis, involving multiple sites    • Hypertension    • Lumbosacral spondylosis without myelopathy    • Pain in joint, pelvic region and thigh    • Preventative health care    • Right groin pain    • Transient cerebral ischemia    • Ulnar neuropathy    • Upper back pain on right side    • UTI (urinary tract infection)    • Varicose veins        Past Surgical History:   Procedure Laterality Date   • CARDIAC SURGERY     • INGUINAL HERNIA REPAIR     • LEG SURGERY         MEDICATIONS: The current medication list was reviewed and reconciled.     Allergies:  is allergic to hydrocodone and norvasc [amlodipine  "besylate].    Family History   Problem Relation Age of Onset   • Hypertension Mother    • Arthritis Father          Review of Systems   Constitutional: Positive for activity change, appetite change and fatigue. Negative for chills and fever.   HENT: Positive for mouth sores and tinnitus. Negative for congestion and sore throat.    Eyes: Negative.    Respiratory: Positive for shortness of breath. Negative for cough and wheezing.    Cardiovascular: Negative for chest pain and palpitations.   Gastrointestinal: Positive for constipation. Negative for diarrhea, nausea and vomiting.   Endocrine: Negative.    Genitourinary: Negative.    Musculoskeletal: Positive for arthralgias.   Skin: Negative.    Allergic/Immunologic: Negative.    Neurological: Negative.    Hematological: Bruises/bleeds easily.   Psychiatric/Behavioral: Positive for sleep disturbance.       Physical Exam  Vital Signs: BP 98/64   Pulse 80   Temp 97.6 °F (36.4 °C) (Temporal)   Resp 16   Ht 175.3 cm (69\")   Wt 78.9 kg (174 lb)   SpO2 95%   BMI 25.70 kg/m²    General Appearance:  alert, cooperative, no apparent distress and appears stated age   Neurologic/Psychiatric: A&O x 3, gait steady, appropriate affect   HEENT:  Normocephalic, without obvious abnormality, mucous membranes moist   Lungs:   Clear to auscultation bilaterally; respirations regular, even, and unlabored bilaterally   Heart:  Regular rate and rhythm, no murmurs appreciated   Extremities: Normal, atraumatic; no clubbing, cyanosis, or edema    Skin: No rashes, lesions, or abnormal coloration noted     ECOG Performance Status: (1) Restricted in physically strenuous activity, ambulatory and able to do work of light nature          NEEDS ASSESSMENTS    Genetics  The patient's new diagnosis and family history have been reviewed for genetic counseling needs. A genetic referral is not recommended.     Psychosocial  The patient has completed a PHQ-9 Depression Screening and the Distress " "Thermometer (DT) today.   PHQ-9 results show 5-9 (Mild Depression). The patient scored their distress today as 2 on a scale of 0-10 with 0 being no distress and 10 being extreme distress.   Problems marked by the patient as being an issue for them within the last week include physical problems.   Results were reviewed along with psychosocial resources offered by our cancer center. Our oncology social worker will be flagged for a DT score of 4 or above, and a same day call will be made for a score of 9 or 10. A mental health referral is recommended at this time. The patient is not accepting of a referral to [facility].   Copies of patient's questionnaires will be scanned into EMR for details and further reference.    Barriers to care  A barriers form was also completed by the patient today. We discussed services offered by our facility to help him have adequate access to care. The patient was given the name and card for our Oncology Social Worker, Keyonna Augustin. Based upon barriers assessment today, the patient will not require a follow-up call from the  to further discuss needs.   A copy of the barriers form will also be scanned into EMR for details and further reference.     VAD Assessment  The patient and I discussed planned intervenous chemotherapy as well as other IV treatments that are often needed throughout the course of treatment. These may include, but are not limited to blood transfusions, antibiotics, and IV hydration. The vasculature does appear to be adequate for multiple peripheral IVs throughout their treatment course. Discussed risks and benefits of VADs. The patient would not like to pursue Port-A-Cath insertion prior to initiation of treatment.     Advanced Care Planning  The patient and I discussed advanced care planning, \"Conversations that Matter\".   This service was offered, free of charge, for development of advance directives with a certified ACP facilitator.  The " patient does have an up-to-date advanced directive. This document is not on file with our office. The patient is not interested in an appointment with one of our facilitators to create or update their advanced directives.      Palliative Care  The patient and I discussed palliative care services. Palliative care is not the same as Hospice care. This is specialized medical care for people living with serious illness with the goal of improving quality of life for the patient and their family. [facility] has partnered with [facility] to offer our patients outpatient palliative care early along with their treatment to assist in coordination of care, symptom management, pain management, and medical decision making.  Oncology criteria for palliative care referral is not met at this time. The patient is not interested in a palliative care consultation.     Additional Referral needs  none      CHEMOTHERAPY EDUCATION    Booklets Given: Chemotherapy and You [x]  Eating Hints [x]    Sexuality/Fertility Books []      Chemotherapy/Biotherapy Education Sheets: (list all that apply)  nausea management, acid reflux management, diarrhea management, Cancer resourse contacts information, skin and mouth care and vaccination information                                                                                                                                                                 Chemotherapy Regimen:   Treatment Plans     Name Type Plan dates Plan Provider         Active    OP LUNG Atezolizumab / CARBOplatin AUC=5 / Etoposide ([facility]) ONCOLOGY TREATMENT  5/5/2019 - Present Ninfa Vale MD                    TOPICS EDUCATION PROVIDED COMMENTS   ANEMIA:  role of RBC, cause, s/s, ways to manage, role of transfusion [x]    THROMBOCYTOPENIA:  role of platelet, cause, s/s, ways to prevent bleeding, things to avoid, when to seek help [x]    NEUTROPENIA:  role of WBC, cause, infection precautions, s/s of  infection, when to call MD [x]    NUTRITION & APPETITE CHANGES:  importance of maintaining healthy diet & weight, ways to manage to improve intake, dietary consult, exercise regimen [x]    DIARRHEA:  causes, s/s of dehydration, ways to manage, dietary changes, when to call MD [x]    CONSTIPATION:  causes, ways to manage, dietary changes, when to call MD [x]    NAUSEA & VOMITING:  cause, use of antiemetics, dietary changes, when to call MD [x]    MOUTH SORES:  causes, oral care, ways to manage [x]    ALOPECIA:  cause, ways to manage, resources [x]    INFERTILITY & SEXUALITY:  causes, fertility preservation options, sexuality changes, ways to manage, importance of birth control [x]    NERVOUS SYSTEM CHANGES:  causes, s/s, neuropathies, cognitive changes, ways to manage [x]    PAIN:  causes, ways to manage [x] ????   SKIN & NAIL CHANGES:  cause, s/s, ways to manage [x]    ORGAN TOXICITIES:  cause, s/s, need for diagnostic tests, labs, when to notify MD [x]    SURVIVORSHIP:  distress, distress assessment, secondary malignancies, early/late effects, follow-up, social issues, social support [x]    HOME CARE:  use of spill kits, storing of PO chemo, how to manage bodily fluids [x]    MISCELLANEOUS:  drug interactions, administration, vesicant, et [x]        Assessment and Plan:    Diagnoses and all orders for this visit:    Neuroendocrine carcinoma of lung (CMS/HCC)        This was a 35 minute face-to-face visit with 30 minutes spent in  counseling and coordination of care as documented above.   The patient and I have reviewed their new cancer diagnosis and scheduled treatment plan. Needs assessment was completed including genetics, psychosocial needs, barriers to care, VAD evaluation, advanced care planning, and palliative care services. Referrals have been ordered as appropriate based upon our evaluation and patient desires.     Chemotherapy teaching was also completed today as documented above. Adequate time was given  to answer all questions to his satisfaction. Patient and family are aware of their care team members and contact information if they have questions or problems throughout the treatment course. Needs assessments and education has been completed. The patient is adequately prepared to begin treatment as scheduled.     Reviewed prescription for Zofran to be taken every 8 hours as needed for nausea/vomiting.     I advised the patient the he can take over the counter pain relievers for aches/pains/fever related to cancer/treatment.     I reviewed with the patient and his wife the patient care team members. I also reviewed the option of the urgent care clinic through our oncology office for evaluation and management of symptoms related to treatment.     Yajaira Tesfaye, APRN   05/01/2019

## 2019-05-07 NOTE — OUTREACH NOTE
Medical Week 3 Survey      Responses   Facility patient discharged from?  [facility]   Does the patient have one of the following disease processes/diagnoses(primary or secondary)?  Other   Week 3 attempt successful?  No   Unsuccessful attempts  Attempt 1          Bibiana Bland RN

## 2019-05-08 NOTE — PROGRESS NOTES
1245- Arrived to [facility] for PICC placement. Denies c/o's. Very pleasant. Wife at bedside.    1430- PICC placed in rt upper arm. Occlusive dressing intact,D/I. nop swelling noted, denies c/o's. Discharged in wheelchair with wife. Going to [facility] office for infusion

## 2019-05-08 NOTE — PROGRESS NOTES
Mr. Sparks has no iv access.  We tried 6 sticks on him. So he will need a port or picc line placed for treatment.

## 2019-05-08 NOTE — PROGRESS NOTES
Oncology Nutrition Screening    Patient Name:  Darron Martino  YOB: 1932  MRN: [id number]  Date:  05/08/19  Physician:  Dr. Nik Vale    Type of Cancer Treatment:   Radiation/Cyberknife: Palliative - Whole brain / 10 fractions  Chemotherapy: Carboplatin / Etoposide /  Add Tecentriq     Patient Active Problem List   Diagnosis   • Hypertension   • Generalized osteoarthrosis, involving multiple sites   • Lumbosacral spondylosis without myelopathy   • Acquired hyperlipoproteinemia   • CAD s/p MI and CABG in 1993   • Allergic rhinitis   • Varicose veins   • Malignant pleural effusion   • Melanoma resected left arm 2018   • Right lung atelectasis due to large pleural effusion   • Hyponatremia   • Neuroendocrine carcinoma of lung (CMS/HCC)       Current Outpatient Medications   Medication Sig Dispense Refill   • Magic mouthwash Radonc swish/swallow/spit 5-10 ml before meals and at bedtime 480 mL 0   • memantine (NAMENDA) 10 MG tablet Take 10 mg by mouth 2 (Two) Times a Day.     • metoprolol succinate XL (TOPROL XL) 50 MG 24 hr tablet Take 50 mg by mouth Daily.     • ramipril (ALTACE) 5 MG capsule Take 1 capsule by mouth Daily. 90 capsule 3     No current facility-administered medications for this visit.        Glycemic Risk:   n/a    Weight:   Height: 69 inches  Weight: 173 lbs.  Usual Body Weight: ~192-193 lbs  BMI: 25.5  Overweight  Weight has decreased ~20 pounds over last ~1.5 months   10.4% loss in body weight; patient/wife speculate some weight to be lost d/t fluid removal while inpatient (~4L per EMR)    Oral Food Intake:  Regular Diet - No Restrictions    Hydration Status:   How many 8 ounce glass of water of fluid do you drink per day?  ~3 - 4    Enteral Feeding:   n/a    Nutrition Symptoms:   Mouth Sores - improving    Activity:   Not my normal self, but able to be up and about with fairly normal activities    Reports that he has quit smoking. He has never used smokeless tobacco. He  "reports that he drinks alcohol. He reports that he does not use drugs.    Evaluation of Nutritional Risk:   Patient identified to be at nutritional risk due to diagnosis, treatment, and present nutrition symptoms. Me with patient and wife during second round of infusion; patient received first treatment while inpatient. Also receiving palliative XRT. Per previous RD notes, patient with recent weight loss, decreased PO intake, and declining functional status. Patient meets criteria for Severe, Acute Malnutrition per reported symptoms. Weight has stabilized but yet to regain.     Patient reports improvement in his appetite, he is eating smaller more frequent meals during the day and this is helping. Reports he has been able to tolerate most foods, not a picky eater. He did have trouble with mouth sores which decreased his intake but this is resolving as he is using Magic Mouthwash. Reports he has been focusing on softer, moist foods. He tells me he knows he needs to stay well hydrated but has only been drinking a couple cups of water a day. Reviewed examples of beverages that are considered hydrating fluids - decaffeinated coffee/tea, flavored/sparkling water, ginger ale, Sprite, Gatorade. Patient reports he will focus on staying well hydrated. RD provided patient with Ensure/Boost nutrition supplements at previous visit. Patient states he has occasionally been drinking them but not regularly. Encouraged patient to focus on solid foods as his appetite allows, and use the nutrition supplements separate from his meals to increase calories/protein intake. Patient and wife voiced understanding of information discussed.     Provided patient with \"Soft and Moist High Protein Menu Ideas\" educational handout from the Academy of [employer]. RD's contact information provided as well and encouraged them to call with any nutrition questions. Will continue to monitor patient's weight/intake during " treatment.      Electronically signed by:  Cristina Maldonado RDN  10:27 AM

## 2019-05-08 NOTE — OUTREACH NOTE
Medical Week 3 Survey      Responses   Facility patient discharged from?  [place]   Does the patient have one of the following disease processes/diagnoses(primary or secondary)?  Other   Week 3 attempt successful?  No   Unsuccessful attempts  Attempt 2          Susanne London RN

## 2019-05-08 NOTE — PLAN OF CARE
Outpatient Infusion • [address] [facility] • [address] • [place] [id number] • [phone]      CHEMOTHERAPY EDUCATION SHEET    NAME:  Darron Martino      : 7/3/1932           DATE: 19    Booklets Given: Chemotherapy and You []  Eating Hints []    Sexuality/Fertility Books []     Chemotherapy/Biotherapy Education Sheets: (list all that apply)  Carboplatin/Etoposide/Atezolizumab                                                                                                                                                                Chemotherapy Regimen:  Carboplatin/Etoposide/Atezolizumab    TOPICS EDUCATION PROVIDED EDUCATION REINFORCED COMMENTS   ANEMIA:  role of RBC, cause, s/s, ways to manage, role of transfusion [] [x] Discussed potential for fatigue due to depleted red blood cells. Added that next cycle when he receives Atezolizumab fatigue may increase.   THROMBOCYTOPENIA:  role of platelet, cause, s/s, ways to prevent bleeding, things to avoid, when to seek help [] [x] Discussed potential for increased bleeding with chemo   NEUTROPENIA:  role of WBC, cause, infection precautions, s/s of infection, when to call MD [] [x] Discussed increase risk of infection with these agents   NUTRITION & APPETITE CHANGES:  importance of maintaining healthy diet & weight, ways to manage to improve intake, dietary consult, exercise regimen [] [x] Discussed the decrease in appetite with these agents   DIARRHEA:  causes, s/s of dehydration, ways to manage, dietary changes, when to call MD [] [x] Discussed what to expect with diarrhea and  How to manage OTC. Also discussed that if he has more than 5 loose stools in one day without OTC relief that he should contact Dr.   CONSTIPATION:  causes, ways to manage, dietary changes, when to call MD [] [x] Discussed potential for constipation with these agents. Patient stated that hes been taking Miralax and that has really helped him.   NAUSEA & VOMITING:  cause, use of  antiemetics, dietary changes, when to call MD [] [x] Discussed the potential for NV with these agents.   MOUTH SORES:  causes, oral care, ways to manage [] [x] Discussed the potential for mouth sores and how to manage them.   ALOPECIA:  cause, ways to manage, resources [] []    INFERTILITY & SEXUALITY:  causes, fertility preservation options, sexuality changes, ways to manage, importance of birth control [] [x] Reminded patient that he needs to wear a condom for the next 48 hours if he is going to be sexually active.   NERVOUS SYSTEM CHANGES:  causes, s/s, neuropathies, cognitive changes, ways to manage [] [x] Mentioned the potential for peripheral neuropathy with carboplatin.   PAIN:  causes, ways to manage [] [] ????   SKIN & NAIL CHANGES:  cause, s/s, ways to manage [] []    ORGAN TOXICITIES:  cause, s/s, need for diagnostic tests, labs, when to notify MD [] [x] Explained that the patient needed to drink plenty of fluids.   SURVIVORSHIP:  distress, distress assessment, secondary malignancies, early/late effects, follow-up, social issues, social support [] []    HOME CARE:  use of spill kits, storing of PO chemo, how to manage bodily fluids [] [x] Explained that if the patient has an accident that it needs to be cleaned up with gloves and to put seat down when flushing the toilet for the next 48 hours.   MISCELLANEOUS:  drug interactions, administration, vesicant, et [] [x] Explained to the patient that they wouldn't be receiving the Atezolizumab today because we were waiting on prior auth. Told hiim once insurance got back with us we would start next round.     Referrals:      Notes:  Patient was in good spirits and already understood what to expect with many of the side effects. I gave the patient education sheets for Carboplatin, Etoposide, and Atezolizumab. Patient signed consent form and left business card with family.

## 2019-05-09 NOTE — PROGRESS NOTES
PROBLEM LIST:  Oncology/Hematology History    1.  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  2.  Plan for palliative WBRT starting 4/23/2019  3.  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  Patient will have Tecentriq added to his regimen with cycle 2.          Neuroendocrine carcinoma of lung (CMS/HCC)    4/14/2019 Initial Diagnosis     Neuroendocrine carcinoma of lung (CMS/HCC)         4/16/2019 -  Chemotherapy     OP LUNG Etoposide / CARBOplatin AUC=5           4/23/2019 - 5/6/2019 Radiation     Radiation OncologyTreatment Course:  Darron Martino received 3000 cGy in 10 fractions to brain via External Beam Radiation - EBRT.         5/8/2019 -  Chemotherapy     OP LUNG Atezolizumab / CARBOplatin AUC=5 / Etoposide (SCLC)              REASON FOR VISIT: Management of my lung cancer    HISTORY OF PRESENT ILLNESS:   86 y.o.  male presents today for follow-up of his lung cancer.  He was initially treated with radiation to the brain due to brain metastases.  Subsequently received carboplatin and etoposide in the hospital.  Clinically doing reasonably well.  Denies any issues with nausea.  Does have some vertigo at times.  No weight loss.  No oral sores.    Past medical history, social history and family history was reviewed and unchanged from prior visit.    Review of Systems:    Review of Systems - Oncology   A comprehensive 14 point review of systems was performed and was negative except as mentioned.      Medications:  The current medication list was reviewed in the EMR    ALLERGIES:    Allergies   Allergen Reactions   • Norvasc [Amlodipine Besylate] Shortness Of Breath     Severe shortness of breath as soon as he takes it   • Other Shortness Of Breath     Pt reports was started on a medication similar to norvasc but can't remember the name but had  "same reaction to it as he did the norvasc   • Hydrocodone GI Intolerance     Severe constipation; Originally listed as \"other\" with hydrocodone/apap in the comment field.          Physical Exam    VITAL SIGNS:  /56 Comment: LUE  Pulse 79   Temp 97.5 °F (36.4 °C) (Temporal)   Resp 18   Ht 171.5 cm (67.5\")   Wt 78.5 kg (173 lb)   SpO2 95% Comment: RA  BMI 26.70 kg/m²     Wt Readings from Last 3 Encounters:   05/09/19 78.5 kg (173 lb)   05/08/19 78.5 kg (173 lb)   05/01/19 78.9 kg (174 lb)       Body mass index is 26.7 kg/m². Body surface area is 1.91 meters squared.         Performance Status: 1    General: well appearing, in no acute distress  HEENT: sclera anicteric, oropharynx clear, neck is supple  Lymphatics: no cervical, supraclavicular, or axillary adenopathy  Cardiovascular: regular rate and rhythm, no murmurs, rubs or gallops  Lungs: clear to auscultation bilaterally  Abdomen: soft, nontender, nondistended.  No palpable organomegaly  Extremities: no lower extremity edema  Skin: no rashes, lesions, bruising, or petechiae  Msk:  Shows no weakness of the large muscle groups  Psych: Mood is stable        RECENT LABS:    Lab Results   Component Value Date    HGB 12.4 (L) 05/08/2019    HCT 36.7 (L) 05/08/2019    MCV 90.1 05/08/2019     (H) 05/08/2019    WBC 7.30 05/08/2019    NEUTROABS 4.60 05/08/2019    LYMPHSABS 2.00 05/08/2019    MONOSABS 0.70 05/08/2019    EOSABS 0.00 04/18/2019    BASOSABS 0.01 04/18/2019       Lab Results   Component Value Date    GLUCOSE 134 (H) 05/08/2019    BUN 10 05/08/2019    CREATININE 0.83 05/08/2019     05/08/2019    K 4.2 05/08/2019     05/08/2019    CO2 26.0 05/08/2019    CALCIUM 8.9 05/08/2019    PROTEINTOT 6.6 05/08/2019    ALBUMIN 2.90 (L) 05/08/2019    BILITOT 0.2 05/08/2019    ALKPHOS 133 (H) 05/08/2019    AST 19 05/08/2019    ALT 31 05/08/2019       Xr Spine Thoracic 3 View    Result Date: 2/15/2019  No evidence for acute fracture, subluxation " or dislocation of the thoracic spine in similar appearance to 10/08/2015 comparison.  D:  02/15/2019 E:  02/15/2019  This report was finalized on 2/15/2019 2:36 PM by Dr. Josh Coronado.      Ct Chest Without Contrast    Result Date: 4/11/2019  Large right pleural effusion with intervening opacifications of a largely atelectatic right lung. Narrowing and decreased patency of the right lower lobe bronchus and bronchus intermedius of adjacent compression versus bulk involvement. Opacifications in atelectatic right lower lobe may represent airspace disease versus underlying mass lesion which should be followed.  D:  04/11/2019 E:  04/11/2019  This report was finalized on 4/11/2019 12:40 PM by Dr. Josh Coronado.      Mri Brain With & Without Contrast    Result Date: 4/15/2019  Multiple small ring-enhancing lesions identified scattered throughout the parenchyma, all within the left cerebral hemisphere and right posterior fossa. The largest lesion is 5 mm. Findings suggest metastatic disease.  DICTATED:   04/14/2019 EDITED/ls :   04/14/2019    This report was finalized on 4/15/2019 11:49 AM by Dr. Екатерина Vela MD.      Ct Abdomen Pelvis With Contrast    Result Date: 4/15/2019  Disease identified within the right lung base with small right pleural effusion and extensive mass density present. There is no acute intra-abdominal or pelvic abnormality. No metastatic disease is seen within the abdomen or pelvis.  DICTATED:   04/14/2019 EDITED/ls :   04/14/2019  This report was finalized on 4/15/2019 11:49 AM by Dr. Екатерина Vela MD.      Xr Chest 1 View    Result Date: 4/18/2019  Right-sided thoracostomy tube has been pulled with persistent right pleural effusion and no pneumothorax.  D:  04/18/2019 E:  04/18/2019  This report was finalized on 4/18/2019 8:55 AM by Romulo Garcia.      Xr Chest 1 View    Result Date: 4/17/2019  Stable right basilar atelectasis, and mild pulmonary venous hypertension.  D:  04/16/2019 E:   04/16/2019  This report was finalized on 4/17/2019 11:23 AM by DR. Bret Julien MD.      Xr Chest 1 View    Result Date: 4/17/2019  Right basilar atelectasis plus/minus effusion, not significantly changed from prior study.  D:  04/17/2019 E:  04/17/2019  This report was finalized on 4/17/2019 11:14 AM by DR. Bret Julien MD.      Xr Chest 1 View    Result Date: 4/15/2019  Stable chest as above.  D:  04/15/2019 E:  04/15/2019  This report was finalized on 4/15/2019 11:50 AM by Dr. Екатерина Vela MD.      Xr Chest 1 View    Result Date: 4/15/2019  Slight decrease seen in size of the pleural effusion on the right with increased markings remaining at the right lung base. No pneumothorax.  DICTATED:   04/14/2019 EDITED/ls :   04/14/2019  This report was finalized on 4/15/2019 11:14 AM by Dr. Екатерина Vela MD.      Xr Chest 1 View    Result Date: 4/14/2019  Larger-sized chest tube compared to the prior study with decrease seen in the size of the right pleural effusion. Mild increased markings seen at the right lung base. No pneumothorax.  DICTATED:   04/13/2019 EDITED/ls :   04/13/2019  This report was finalized on 4/14/2019 10:32 AM by Dr. Екатерина Vela MD.      Xr Chest 1 View    Result Date: 4/13/2019  Right chest tube is in place with complete opacification again seen throughout the right hemithorax. Left lung is grossly clear.  DICTATED:   04/13/2019 EDITED/ls :   04/13/2019  This report was finalized on 4/13/2019 12:03 PM by Dr. Екатерина Vela MD.      Xr Chest 1 View    Result Date: 4/13/2019  Right pleural drain appears to remain in place but there is still opacification of most of the right chest. Stable mild left basilar interstitial disease.  D:  04/12/2019 E:  04/12/2019  This report was finalized on 4/13/2019 10:21 AM by DR. Bret Julien MD.      Xr Chest 1 View    Result Date: 4/11/2019  Near-total opacification of the right hemithorax from likely large effusion component with adjacent  opacifications of atelectasis versus airspace disease without mediastinal shift.  D:  04/11/2019 E:  04/11/2019  This report was finalized on 4/11/2019 12:41 PM by Dr. Josh Coronado.      Ct Guided Chest Tube    Result Date: 4/11/2019  CT-guided 8.5-Setswana right chest drain placement, and therapeutic and diagnostic thoracentesis. No complications.  D:  04/11/2019 E:  04/11/2019    This report was finalized on 4/11/2019 10:15 PM by DR. Bret Julien MD.            Assessment/Plan    1.  Poorly differentiated neuroendocrine tumor of the lung with brain metastases.  Patient is undergoing treatment per convention by using a small cell regimen.  I am treating him with carboplatin, etoposide and Tecentriq.  He is actually responding to treatment clinically with improvement in shortness of breath.  I like to get a CAT scan prior to cycle 3 which will be in 3 weeks.  This will allow us to know if he is having a nice response or not.    2.  Brain metastases.  Patient was treated with a radiation for that.          I spent a total of 25 minutes in direct patient care, greater than 20 minutes (greater than 50%) were spent in coordination of care, and counseling the patient regarding  Lung cancer . Answered any questions patient had with medication and plan.      Ninfa Vale MD  [facility]         No orders of the defined types were placed in this encounter.      5/9/2019         Please note that portions of this note may have been completed with a voice recognition program. Efforts were made to edit the dictations, but occasionally words are mistranscribed.

## 2019-05-16 NOTE — RADIATION COMPLETION NOTES
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      BACKGROUND: Darron Martino is an 86-year-old gentleman who has unfortunately been diagnosed with extensive stage small cell lung cancer.  He was found to have 5 CNS metastases.  However, the bulk of his disease was within the chest, and the overall plans were to treat him using chemotherapy delivered every 4 weeks.  He completed his first cycle of chemotherapy and then he underwent a course of whole brain radiation therapy between the first 2 cycles of chemo as detailed below:    Treatment Summary     Dates of Therapy: 4/23/2019 - 5/6/2019  Treatment Site: Brain  Dose: 30 Gy in 10 fractions of 3 Gy each  Technique: Opposed lateral photon fields with 6 X energy    Treatment Course and Tolerance: Mr. Guillen tolerated his radiation treatments very well.  He was maintained on steroids for at least the first week, then he was given a schedule to taper off of these.  He was also treated for a oral candidiasis.  He did not develop any other radiation related toxicities.    The initial follow up visit will be in 1 month.    Darron Martino knows to call if any problems or concerns develop in the meantime.     Electronically signed by: Jose David Guzman MD                    Cc: Duong Greene MD

## 2019-05-20 NOTE — PROGRESS NOTES
Subjective   Darron Martino is a 86 y.o. male    Chief Complaint    Hospital follow-up  Neuroendocrine carcinoma of the lung with brain metastases  Right pleural effusion      History of Present Illness  Patient presents today for a hospital follow-up visit.  The visit does not qualify as transition of care visit due to length of time as the patient was admitted April 11 and discharged April 18, 2019.  Patient initially presented to the emergency department with significant shortness of breath.  He is been scheduled at our office that day but symptoms worsened to the point that he did not feel he could make it to the office.  He reports that he was seen promptly and taken care of quite effectively and urgently.  He was found to have a large right pleural effusion and underwent an urgent thoracentesis and chest tube placement.  Studies revealed abnormal cytology consistent with a neuroendocrine carcinoma.  Work-up included scanning which revealed brain metastases.  The patient was started on chemotherapy on 4/16/2019.  Since discharge from the hospital he has had several other chemotherapy treatments and has completed radiation treatment to his brain mets.  He reports to me today that he feels fairly well.  His appetite is good and he is eating.  He reports that he lost about 20 pounds initially but he is maintaining his weight at the 172 to 175 pound range.  Blood pressure medications were changed due to hypotension.  Patient reports that his arthritis symptoms have lessened remarkably.    The following portions of the patient's history were reviewed and updated as appropriate: allergies, current medications, past social history and problem list    Review of Systems   Constitutional: Positive for fatigue. Negative for chills and fever.   HENT: Negative.    Eyes: Negative.    Respiratory: Negative for cough, shortness of breath and wheezing.    Cardiovascular: Negative for chest pain, palpitations and leg  swelling.   Gastrointestinal: Negative for abdominal pain, diarrhea, nausea and vomiting.   Endocrine: Negative for polydipsia, polyphagia and polyuria.   Genitourinary: Negative.    Musculoskeletal: Negative for arthralgias, joint swelling and myalgias.   Neurological: Negative for dizziness, tremors, seizures, light-headedness and numbness.   Hematological: Negative for adenopathy. Bruises/bleeds easily.   Psychiatric/Behavioral: Negative for dysphoric mood. The patient is not nervous/anxious.        Objective     Vitals:    05/20/19 1449   BP: 128/62   Pulse: 84   Temp: 99 °F (37.2 °C)   SpO2: 96%       Physical Exam   Constitutional: He is oriented to person, place, and time. He appears well-developed and well-nourished.   HENT:   Head: Normocephalic.   Mouth/Throat: Oropharynx is clear and moist.   Eyes: Conjunctivae are normal. Pupils are equal, round, and reactive to light. No scleral icterus.   Neck: Neck supple.   Cardiovascular: Normal rate and regular rhythm.   Pulmonary/Chest: Effort normal and breath sounds normal.   Abdominal: Soft. Bowel sounds are normal. There is no tenderness.   Musculoskeletal: He exhibits no edema, tenderness or deformity.   Lymphadenopathy:     He has no cervical adenopathy.   Neurological: He is alert and oriented to person, place, and time.   Skin: Skin is warm and dry.   Psychiatric: He has a normal mood and affect. His behavior is normal.   Nursing note and vitals reviewed.      Assessment/Plan   Problem List Items Addressed This Visit        Cardiovascular and Mediastinum    Hypertension       Respiratory    Neuroendocrine carcinoma of lung (CMS/HCC) - Primary      Other Visit Diagnoses     Metastasis to brain (CMS/HCC)        Arteriosclerosis of coronary artery

## 2019-05-29 NOTE — PROGRESS NOTES
PROBLEM LIST:  Oncology/Hematology History    1.  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  2.  Plan for palliative WBRT starting 4/23/2019  3.  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  Patient had Tecentriq added to his regimen with cycle 2.          Neuroendocrine carcinoma of lung (CMS/HCC)    4/14/2019 Initial Diagnosis     Neuroendocrine carcinoma of lung (CMS/HCC)         4/16/2019 -  Chemotherapy     OP LUNG Etoposide / CARBOplatin AUC=5           4/23/2019 - 5/6/2019 Radiation     Radiation OncologyTreatment Course:  Darron Martino received 3000 cGy in 10 fractions to brain via External Beam Radiation - EBRT.         5/8/2019 -  Chemotherapy     OP LUNG Atezolizumab / CARBOplatin AUC=5 / Etoposide (SCLC)              REASON FOR VISIT: Management of my lung cancer    HISTORY OF PRESENT ILLNESS:   86 y.o.  male presents today for follow-up of his lung cancer.  He was initially treated with radiation to the brain due to brain metastases.  He actually is doing reasonably well.  Denies any issues with shortness of breath.  Denies any pain issues.  Continues to be slightly unsteady on his feet.  Overall seems to have done well.    Past medical history, social history and family history was reviewed and unchanged from prior visit.    Review of Systems:    Review of Systems - Oncology   A comprehensive 14 point review of systems was performed and was negative except as mentioned.      Medications:  The current medication list was reviewed in the EMR    ALLERGIES:    Allergies   Allergen Reactions   • Norvasc [Amlodipine Besylate] Shortness Of Breath     Severe shortness of breath as soon as he takes it   • Other Shortness Of Breath     Pt reports was started on a medication similar to norvasc but can't remember the name but had same reaction to  "it as he did the norvasc   • Hydrocodone GI Intolerance     Severe constipation; Originally listed as \"other\" with hydrocodone/apap in the comment field.          Physical Exam    VITAL SIGNS:  /63 Comment: LUE  Pulse 80   Temp 97.1 °F (36.2 °C) (Temporal)   Resp 16   Ht 171.5 cm (67.5\")   Wt 78.5 kg (173 lb)   BMI 26.70 kg/m²     Wt Readings from Last 3 Encounters:   05/29/19 78.5 kg (173 lb)   05/23/19 78.9 kg (174 lb)   05/20/19 78.9 kg (174 lb)       Body mass index is 26.7 kg/m². Body surface area is 1.91 meters squared.         Performance Status: 1    General: well appearing, in no acute distress  HEENT: sclera anicteric, oropharynx clear, neck is supple  Lymphatics: no cervical, supraclavicular, or axillary adenopathy  Cardiovascular: regular rate and rhythm, no murmurs, rubs or gallops  Lungs: clear to auscultation bilaterally  Abdomen: soft, nontender, nondistended.  No palpable organomegaly  Extremities: no lower extremity edema  Skin: no rashes, lesions, bruising, or petechiae  Msk:  Shows no weakness of the large muscle groups  Psych: Mood is stable        RECENT LABS:    Lab Results   Component Value Date    HGB 12.1 (L) 05/29/2019    HCT 36.7 (L) 05/29/2019    MCV 91.8 05/29/2019     05/29/2019    WBC 4.40 05/29/2019    NEUTROABS 1.90 05/29/2019    LYMPHSABS 1.70 05/29/2019    MONOSABS 0.80 05/29/2019    EOSABS 0.00 04/18/2019    BASOSABS 0.01 04/18/2019       Lab Results   Component Value Date    GLUCOSE 91 05/29/2019    BUN 11 05/29/2019    CREATININE 0.70 05/29/2019     05/29/2019    K 4.2 05/29/2019     05/29/2019    CO2 25.0 05/29/2019    CALCIUM 10.1 05/29/2019    PROTEINTOT 7.1 05/29/2019    ALBUMIN 3.70 05/29/2019    BILITOT 0.3 05/29/2019    ALKPHOS 112 05/29/2019    AST 17 05/29/2019    ALT 14 05/29/2019       Ct Chest Without Contrast    Result Date: 4/11/2019  Large right pleural effusion with intervening opacifications of a largely atelectatic right lung. " Narrowing and decreased patency of the right lower lobe bronchus and bronchus intermedius of adjacent compression versus bulk involvement. Opacifications in atelectatic right lower lobe may represent airspace disease versus underlying mass lesion which should be followed.  D:  04/11/2019 E:  04/11/2019  This report was finalized on 4/11/2019 12:40 PM by Dr. Josh Coronado.      Ct Chest With Contrast    Result Date: 5/28/2019  There has been resolution of a large right pleural effusion since the previous examination of 04/11/2019. There are scattered rare subcentimeter pulmonary nodules noted bilaterally. Due to the large effusion on the right is impossible to compare the small nodules on the right. However the small, subcentimeter nodules in the left lung are stable and unchanged.  D:  05/28/2019 E:  05/28/2019   This report was finalized on 5/28/2019 4:39 PM by Dr. Arpit Stout MD.      Mri Brain With & Without Contrast    Result Date: 5/28/2019  No evidence of remaining intracranial metastatic disease or other new disease.  D:  05/28/2019 E:  05/28/2019           Ct Abdomen Pelvis With Contrast    Result Date: 4/15/2019  Disease identified within the right lung base with small right pleural effusion and extensive mass density present. There is no acute intra-abdominal or pelvic abnormality. No metastatic disease is seen within the abdomen or pelvis.  DICTATED:   04/14/2019 EDITED/ls :   04/14/2019  This report was finalized on 4/15/2019 11:49 AM by Dr. Екатерина Vela MD.        Assessment/Plan    1.  Poorly differentiated neuroendocrine tumor of the lung with brain metastases.  We are going to continue cycle #3 of chemotherapy with carboplatin, etoposide and Tecentriq.  Plan to drop chemotherapy after 4 cycles.  We will continue with Tecentriq maintenance after that.  I reviewed the CAT scans of his chest abdomen pelvis which shows nice resolution of his effusion.  He seems to be doing reasonably well  otherwise.  Plan to rescan him after 2 more cycles.    2.  Brain metastases.  Patient was treated with a radiation for that.          I spent a total of 25 minutes in direct patient care, greater than 20 minutes (greater than 50%) were spent in coordination of care, and counseling the patient regarding  Lung cancer . Answered any questions patient had with medication and plan.      Ninfa Vale MD  [facility]ematology [facility] On[facility]    Return on: 06/19/19  Return in (Approximately): 3 weeks, Schedule with next infusion    Orders Placed This Encounter   Procedures   • Comprehensive metabolic panel   • CBC and Differential       5/29/2019         Please note that portions of this note may have been completed with a voice recognition program. Efforts were made to edit the dictations, but occasionally words are mistranscribed.

## 2019-06-03 NOTE — PROGRESS NOTES
FOLLOW UP NOTE    PATIENT:                                                      Darron Martino  MEDICAL RECORD #:                        [id number]  :                                                          7/3/1932  COMPLETION DATE:    2019  DIAGNOSIS:     Cancer Staging  Neuroendocrine carcinoma of lung (CMS/HCC)  Staging form: Lung, AJCC 8th Edition  - Clinical: Stage IV (cN3, cM1a) - Signed by Ninfa Vale MD on 2019    BRIEF HISTORY:  Mr. Martino returns to clinic today 1 month after completing whole brain radiation for metastatic small cell lung cancer.  He has now completed 2 cycles of systemic chemotherapy and based on imaging, he has had a very good response to treatment within the right lower lobe and mediastinum.  He also recently underwent an MRI of the brain showing a complete response to treatment.  Aside from some mild fatigue and occasional short-term memory difficulties that he describes as feeling foggy, he denies any other major complaints.    MEDICATIONS: Medication reconciliation for the patient was reviewed and confirmed in the electronic medical record.    Review of Systems   Constitutional: Positive for fatigue.   HENT:   Positive for hearing loss (hearing aids).         Reports loss of taste   Gastrointestinal: Positive for constipation.   Musculoskeletal: Positive for arthralgias (right leg) and gait problem (walks with cane).   Neurological: Positive for gait problem (walks with cane).        Generalized weakness   All other systems reviewed and are negative.      KPS 80%    Physical Exam   Constitutional: He is oriented to person, place, and time. He appears well-developed and well-nourished. No distress.   HENT:   Head: Normocephalic and atraumatic.   Mouth/Throat: Oropharynx is clear and moist.   Eyes: Conjunctivae and EOM are normal. Pupils are equal, round, and reactive to light.   Neck: Normal range of motion. Neck supple.   Cardiovascular: Normal rate and  regular rhythm. Exam reveals no friction rub.   No murmur heard.  Pulmonary/Chest: Effort normal and breath sounds normal. He has no wheezes.   Good air movement throughout all lung fields   Abdominal: Soft. Bowel sounds are normal. He exhibits no distension and no mass. There is no tenderness.   Musculoskeletal: Normal range of motion. He exhibits no edema.   Lymphadenopathy:     He has no cervical adenopathy.   Neurological: He is alert and oriented to person, place, and time.   Skin: Skin is warm and dry.   Psychiatric: He has a normal mood and affect. His behavior is normal. Judgment and thought content normal.   Nursing note and vitals reviewed.      VITAL SIGNS:   Vitals:    06/03/19 1416   BP: 93/65   Pulse: 85   Resp: 18   Temp: 97.4 °F (36.3 °C)   TempSrc: Temporal   SpO2: 98%   Weight: 79 kg (174 lb 3.2 oz)   PainSc: 0-No pain       The following portions of the patient's history were reviewed and updated as appropriate: allergies, current medications, past family history, past medical history, past social history, past surgical history and problem list.    IMAGING  I have personally reviewed the relevant imaging studies, as follows:  Ct Chest Without Contrast    Result Date: 4/11/2019  Large right pleural effusion with intervening opacifications of a largely atelectatic right lung. Narrowing and decreased patency of the right lower lobe bronchus and bronchus intermedius of adjacent compression versus bulk involvement. Opacifications in atelectatic right lower lobe may represent airspace disease versus underlying mass lesion which should be followed.  D:  04/11/2019 E:  04/11/2019  This report was finalized on 4/11/2019 12:40 PM by Dr. Josh Coronado.      Ct Chest With Contrast    Result Date: 5/28/2019  There has been resolution of a large right pleural effusion since the previous examination of 04/11/2019. There are scattered rare subcentimeter pulmonary nodules noted bilaterally. Due to the large  effusion on the right is impossible to compare the small nodules on the right. However the small, subcentimeter nodules in the left lung are stable and unchanged.  D:  05/28/2019 E:  05/28/2019   This report was finalized on 5/28/2019 4:39 PM by Dr. Arpit Stout MD.      Mri Brain With & Without Contrast    Result Date: 5/29/2019  No evidence of remaining intracranial metastatic disease or other new disease.  D:  05/28/2019 E:  05/28/2019     This report was finalized on 5/29/2019 10:22 PM by DR. Bret Julien MD.      Mri Brain With & Without Contrast    Result Date: 4/15/2019  Multiple small ring-enhancing lesions identified scattered throughout the parenchyma, all within the left cerebral hemisphere and right posterior fossa. The largest lesion is 5 mm. Findings suggest metastatic disease.  DICTATED:   04/14/2019 EDITED/ls :   04/14/2019    This report was finalized on 4/15/2019 11:49 AM by Dr. Екатерина Vela MD.      Ct Abdomen Pelvis With Contrast    Result Date: 4/15/2019  Disease identified within the right lung base with small right pleural effusion and extensive mass density present. There is no acute intra-abdominal or pelvic abnormality. No metastatic disease is seen within the abdomen or pelvis.  DICTATED:   04/14/2019 EDITED/ls :   04/14/2019  This report was finalized on 4/15/2019 11:49 AM by Dr. Екатерина Vela MD.           Darron was seen today for metastatic small cell lung cancer to brain.    Diagnoses and all orders for this visit:    Neuroendocrine carcinoma of lung (CMS/HCC)         IMPRESSION:  Mr. Martino is a 86 year old male with extensive stage small cell lung cancer.  He has had a very good response to radiation therapy to the brain, with no residual disease.  He similarly has had a very nice response to systemic therapy.  I discussed with him the ongoing surveillance plans for his brain, as well as the potential role of consolidative thoracic radiation therapy at the end of  chemotherapy.  He acknowledged his understanding.  I will see him back in 3 months time.    RECOMMENDATIONS:      Return in about 3 months (around 9/3/2019) for Office Visit.    Jose David Guzman MD    Errors in dictation may reflect use of voice recognition software and not all errors in transcription may have been detected prior to signing.

## 2019-06-19 NOTE — PROGRESS NOTES
PROBLEM LIST:  Oncology/Hematology History    1.  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  2.  Plan for palliative WBRT starting 4/23/2019  3.  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  Patient had Tecentriq added to his regimen with cycle 2.          Neuroendocrine carcinoma of lung (CMS/HCC)    4/14/2019 Initial Diagnosis     Neuroendocrine carcinoma of lung (CMS/HCC)         4/16/2019 -  Chemotherapy     OP LUNG Etoposide / CARBOplatin AUC=5           4/23/2019 - 5/6/2019 Radiation     Radiation OncologyTreatment Course:  Darron Martino received 3000 cGy in 10 fractions to brain via External Beam Radiation - EBRT.         5/8/2019 -  Chemotherapy     OP LUNG Atezolizumab / CARBOplatin AUC=5 / Etoposide (SCLC)              REASON FOR VISIT: Management of my lung cancer    HISTORY OF PRESENT ILLNESS:   86 y.o.  male presents today for follow-up of his lung cancer.  He was initially treated with radiation to the brain due to brain metastases.  He actually is doing reasonably well.  Denies any issues with shortness of breath.  Denies any pain issues.  Continues to be slightly unsteady on his feet.  Overall seems to have done well.  However his counts are not tolerating his chemotherapy.  He is having considerable cytopenias related to carboplatin and etoposide.    Past medical history, social history and family history was reviewed and unchanged from prior visit.    Review of Systems:    Review of Systems - Oncology   A comprehensive 14 point review of systems was performed and was negative except as mentioned.      Medications:  The current medication list was reviewed in the EMR    ALLERGIES:    Allergies   Allergen Reactions   • Norvasc [Amlodipine Besylate] Shortness Of Breath     Severe shortness of breath as soon as he takes it   • Other  "Shortness Of Breath     Pt reports was started on a medication similar to norvasc but can't remember the name but had same reaction to it as he did the norvasc   • Hydrocodone GI Intolerance     Severe constipation; Originally listed as \"other\" with hydrocodone/apap in the comment field.          Physical Exam    VITAL SIGNS:  /76 Comment: LUE  Pulse 79   Temp 97.3 °F (36.3 °C) (Temporal)   Resp 18   Ht 171.5 cm (67.5\")   Wt 80.7 kg (178 lb)   SpO2 93% Comment: RA  BMI 27.47 kg/m²     Wt Readings from Last 3 Encounters:   06/19/19 80.7 kg (178 lb)   06/13/19 78 kg (172 lb)   06/06/19 78.5 kg (173 lb)       Body mass index is 27.47 kg/m². Body surface area is 1.93 meters squared.         Performance Status: 1    General: well appearing, in no acute distress  HEENT: sclera anicteric, oropharynx clear, neck is supple  Lymphatics: no cervical, supraclavicular, or axillary adenopathy  Cardiovascular: regular rate and rhythm, no murmurs, rubs or gallops  Lungs: clear to auscultation bilaterally  Abdomen: soft, nontender, nondistended.  No palpable organomegaly  Extremities: no lower extremity edema  Skin: no rashes, lesions, bruising, or petechiae  Msk:  Shows no weakness of the large muscle groups  Psych: Mood is stable        RECENT LABS:    Lab Results   Component Value Date    HGB 10.9 (L) 06/19/2019    HCT 31.9 (L) 06/19/2019    MCV 92.5 06/19/2019    PLT 87 (L) 06/19/2019    WBC 4.10 06/19/2019    NEUTROABS 1.90 06/19/2019    LYMPHSABS 1.60 06/19/2019    MONOSABS 0.50 06/19/2019    EOSABS 0.00 04/18/2019    BASOSABS 0.01 04/18/2019       Lab Results   Component Value Date    GLUCOSE 93 06/19/2019    BUN 11 06/19/2019    CREATININE 0.70 06/19/2019     06/19/2019    K 4.2 06/19/2019     06/19/2019    CO2 25.0 06/19/2019    CALCIUM 9.4 06/19/2019    PROTEINTOT 6.4 06/19/2019    ALBUMIN 3.80 06/19/2019    BILITOT 0.4 06/19/2019    ALKPHOS 93 06/19/2019    AST 14 06/19/2019    ALT 11 06/19/2019 "       Ct Chest Without Contrast    Result Date: 4/11/2019  Large right pleural effusion with intervening opacifications of a largely atelectatic right lung. Narrowing and decreased patency of the right lower lobe bronchus and bronchus intermedius of adjacent compression versus bulk involvement. Opacifications in atelectatic right lower lobe may represent airspace disease versus underlying mass lesion which should be followed.  D:  04/11/2019 E:  04/11/2019  This report was finalized on 4/11/2019 12:40 PM by Dr. Josh Coronado.      Ct Chest With Contrast    Result Date: 5/28/2019  There has been resolution of a large right pleural effusion since the previous examination of 04/11/2019. There are scattered rare subcentimeter pulmonary nodules noted bilaterally. Due to the large effusion on the right is impossible to compare the small nodules on the right. However the small, subcentimeter nodules in the left lung are stable and unchanged.  D:  05/28/2019 E:  05/28/2019   This report was finalized on 5/28/2019 4:39 PM by Dr. Arpit Stout MD.      Mri Brain With & Without Contrast    Result Date: 5/28/2019  No evidence of remaining intracranial metastatic disease or other new disease.  D:  05/28/2019 E:  05/28/2019           Ct Abdomen Pelvis With Contrast    Result Date: 4/15/2019  Disease identified within the right lung base with small right pleural effusion and extensive mass density present. There is no acute intra-abdominal or pelvic abnormality. No metastatic disease is seen within the abdomen or pelvis.  DICTATED:   04/14/2019 EDITED/ls :   04/14/2019  This report was finalized on 4/15/2019 11:49 AM by Dr. Екатерина Vela MD.        Assessment/Plan    1.  Poorly differentiated neuroendocrine tumor of the lung with brain metastases.  We going to discontinue chemotherapy with carboplatin and etoposide.  I am going to continue Tecentriq with no dose changes.  He is fairly thrombocytopenic related to his  chemotherapy.  At least this is led him improve.    2.  Brain metastases.  Patient was treated with a radiation for that.    3.  Pancytopenia related to chemotherapy.  He does not require transfusion at this time.  We can just watch and see how he does.          I spent a total of 25 minutes in direct patient care, greater than 20 minutes (greater than 50%) were spent in coordination of care, and counseling the patient regarding  Lung cancer . Answered any questions patient had with medication and plan.      Ninfa Vale MD  [facility]    Return on: 07/10/19  Return in (Approximately): Schedule with next infusion, 3 weeks    Orders Placed This Encounter   Procedures   • Comprehensive metabolic panel   • TSH   • T3, Free   • T4, free   • Comprehensive metabolic panel   • CBC and Differential   • CBC and Differential       6/19/2019         Please note that portions of this note may have been completed with a voice recognition program. Efforts were made to edit the dictations, but occasionally words are mistranscribed.

## 2019-06-19 NOTE — PROGRESS NOTES
"Oncology Nutrition Follow Up    Patient Name:  Darron Martino  YOB: 1932  MRN: [id number]  Date: 06/19/19  Physician: Dr. Nik Vale    Weight: 178 lbs; up 5 lbs from last RD visit 5/18  Nutrition symptoms: none to report    Met with patient and wife at infusion appointment. Patient reports he is doing well. He is maintaining his weight. His appetite is great, he is eating three meals a day and occasionally will snack depending on how hungry he is feeling. States that he is trying to stay well-hydrated; usually drinks at least two to three 16 oz water bottles during the day, sometimes more than that. Reports he has not had anymore issues with mouth sores; bowels moving well. Only symptom to report is some fatigue.     Encouraged patient to continue with good oral intake and hydration. Patient/wife state they continue to do Boost/Ensure supplements \"as needed.\" Provide them with samples of different flavors to try. Again, encouraged patient to continue with good protein intake to maintain lean body mass.    Patient and wife voiced understanding of information discussed. RD's contact information provided and encouraged them to call with any questions/concerns.  Will monitor nutrition status as needed during treatment course.    Electronically signed by:  Cristina Maldonado RDN  11:37 AM  "

## 2019-07-10 NOTE — PROGRESS NOTES
PROBLEM LIST:  Oncology/Hematology History    1.  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  2.  Plan for palliative WBRT starting 4/23/2019  3.  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  Patient had Tecentriq added to his regimen with cycle 2.          Neuroendocrine carcinoma of lung (CMS/HCC)    4/14/2019 Initial Diagnosis     Neuroendocrine carcinoma of lung (CMS/HCC)         4/16/2019 -  Chemotherapy     OP LUNG Etoposide / CARBOplatin AUC=5           4/23/2019 - 5/6/2019 Radiation     Radiation OncologyTreatment Course:  Darron Martino received 3000 cGy in 10 fractions to brain via External Beam Radiation - EBRT.         5/8/2019 -  Chemotherapy     OP LUNG Atezolizumab / CARBOplatin AUC=5 / Etoposide (SCLC)              REASON FOR VISIT: Management of my lung cancer    HISTORY OF PRESENT ILLNESS:   87 y.o.  male presents today for follow-up of his lung cancer.  He was initially treated with radiation to the brain due to brain metastases.  He actually is doing reasonably well.  At our last visit I had stopped the chemotherapy and continue the Tecentriq.  Clinically he is tolerating it well.  Denies any rashes.  Still fairly fatigued.     Past medical history, social history and family history was reviewed and unchanged from prior visit.    Review of Systems:    Review of Systems   Constitutional: Positive for appetite change and fatigue.   HENT:  Negative.    Eyes: Negative.    Respiratory: Negative.    Cardiovascular: Negative.    Gastrointestinal: Negative.    Endocrine: Negative.    Genitourinary: Negative.     Musculoskeletal: Negative.    Neurological: Negative.    Hematological: Negative.    Psychiatric/Behavioral: Negative.       A comprehensive 14 point review of systems was performed and was negative except as  "mentioned.      Medications:  The current medication list was reviewed in the EMR    ALLERGIES:    Allergies   Allergen Reactions   • Norvasc [Amlodipine Besylate] Shortness Of Breath     Severe shortness of breath as soon as he takes it   • Other Shortness Of Breath     Pt reports was started on a medication similar to norvasc but can't remember the name but had same reaction to it as he did the norvasc   • Hydrocodone GI Intolerance     Severe constipation; Originally listed as \"other\" with hydrocodone/apap in the comment field.          Physical Exam    VITAL SIGNS:  /61 Comment: LUE  Pulse 66   Temp 97.4 °F (36.3 °C) (Temporal)   Resp 18   Ht 177.8 cm (70\")   Wt 79.8 kg (176 lb)   SpO2 97% Comment: RA  BMI 25.25 kg/m²     Wt Readings from Last 3 Encounters:   07/10/19 79.8 kg (176 lb)   07/03/19 80.7 kg (178 lb)   06/26/19 80.3 kg (177 lb)       Body mass index is 25.25 kg/m². Body surface area is 1.98 meters squared.         Performance Status: 1    General: well appearing, in no acute distress  HEENT: sclera anicteric, oropharynx clear, neck is supple  Lymphatics: no cervical, supraclavicular, or axillary adenopathy  Cardiovascular: regular rate and rhythm, no murmurs, rubs or gallops  Lungs: clear to auscultation bilaterally  Abdomen: soft, nontender, nondistended.  No palpable organomegaly  Extremities: no lower extremity edema  Skin: no rashes, lesions, bruising, or petechiae  Msk:  Shows no weakness of the large muscle groups  Psych: Mood is stable        RECENT LABS:    Lab Results   Component Value Date    HGB 10.9 (L) 06/19/2019    HCT 31.9 (L) 06/19/2019    MCV 92.5 06/19/2019    PLT 87 (L) 06/19/2019    WBC 4.10 06/19/2019    NEUTROABS 1.90 06/19/2019    LYMPHSABS 1.60 06/19/2019    MONOSABS 0.50 06/19/2019    EOSABS 0.00 04/18/2019    BASOSABS 0.01 04/18/2019       Lab Results   Component Value Date    GLUCOSE 93 06/19/2019    BUN 11 06/19/2019    CREATININE 0.70 06/19/2019     " 06/19/2019    K 4.2 06/19/2019     06/19/2019    CO2 25.0 06/19/2019    CALCIUM 9.4 06/19/2019    PROTEINTOT 6.4 06/19/2019    ALBUMIN 3.80 06/19/2019    BILITOT 0.4 06/19/2019    ALKPHOS 93 06/19/2019    AST 14 06/19/2019    ALT 11 06/19/2019       Ct Chest Without Contrast    Result Date: 4/11/2019  Large right pleural effusion with intervening opacifications of a largely atelectatic right lung. Narrowing and decreased patency of the right lower lobe bronchus and bronchus intermedius of adjacent compression versus bulk involvement. Opacifications in atelectatic right lower lobe may represent airspace disease versus underlying mass lesion which should be followed.  D:  04/11/2019 E:  04/11/2019  This report was finalized on 4/11/2019 12:40 PM by Dr. Josh Coronado.      Ct Chest With Contrast    Result Date: 5/28/2019  There has been resolution of a large right pleural effusion since the previous examination of 04/11/2019. There are scattered rare subcentimeter pulmonary nodules noted bilaterally. Due to the large effusion on the right is impossible to compare the small nodules on the right. However the small, subcentimeter nodules in the left lung are stable and unchanged.  D:  05/28/2019 E:  05/28/2019   This report was finalized on 5/28/2019 4:39 PM by Dr. Arpit Stout MD.      Mri Brain With & Without Contrast    Result Date: 5/28/2019  No evidence of remaining intracranial metastatic disease or other new disease.  D:  05/28/2019 E:  05/28/2019           Ct Abdomen Pelvis With Contrast    Result Date: 4/15/2019  Disease identified within the right lung base with small right pleural effusion and extensive mass density present. There is no acute intra-abdominal or pelvic abnormality. No metastatic disease is seen within the abdomen or pelvis.  DICTATED:   04/14/2019 EDITED/ls :   04/14/2019  This report was finalized on 4/15/2019 11:49 AM by Dr. Екатерина Vela MD.        Assessment/Plan    1.  Poorly  differentiated neuroendocrine tumor of the lung with brain metastases.  Continue Tecentriq maintenance for now with no changes.  Plan to repeat scans likely end of August.  I will see him back in my clinic in 3 weeks to decide when to do his scans.  He will also need MRI of his brain at that time.    2.  Brain metastases.  Patient was treated with a radiation for that.    3.  Pancytopenia related to chemotherapy.  Seems improved since stopping chemotherapy.          I spent a total of 25 minutes in direct patient care, greater than 20 minutes (greater than 50%) were spent in coordination of care, and counseling the patient regarding  Lung cancer . Answered any questions patient had with medication and plan.      Ninfa Vale MD  [facility]ematology [facility] On[facility]         Orders Placed This Encounter   Procedures   • Comprehensive metabolic panel   • TSH   • T3, Free   • T4, free   • CBC and Differential       7/10/2019         Please note that portions of this note may have been completed with a voice recognition program. Efforts were made to edit the dictations, but occasionally words are mistranscribed.

## 2019-07-31 NOTE — PROGRESS NOTES
PROBLEM LIST:  Oncology/Hematology History    1.  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  2.  Plan for palliative WBRT starting 4/23/2019  3.  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  Patient had Tecentriq added to his regimen with cycle 2.          Neuroendocrine carcinoma of lung (CMS/HCC)    4/14/2019 Initial Diagnosis     Neuroendocrine carcinoma of lung (CMS/HCC)         4/16/2019 -  Chemotherapy     OP LUNG Etoposide / CARBOplatin AUC=5           4/23/2019 - 5/6/2019 Radiation     Radiation OncologyTreatment Course:  Darron Martino received 3000 cGy in 10 fractions to brain via External Beam Radiation - EBRT.         5/8/2019 -  Chemotherapy     OP LUNG Atezolizumab / CARBOplatin AUC=5 / Etoposide (SCLC)              REASON FOR VISIT: Management of my lung cancer    HISTORY OF PRESENT ILLNESS:   87 y.o.  male presents today for follow-up of his lung cancer.  He was initially treated with radiation to the brain due to brain metastases.  He actually is doing reasonably well.  At our last visit I had stopped the chemotherapy and continue the Tecentriq.  Clinically he is tolerating it well.  Denies any rashes.  Still fairly fatigued.     Past medical history, social history and family history was reviewed and unchanged from prior visit.    Review of Systems:    Review of Systems   Constitutional: Positive for appetite change and fatigue.   HENT:  Negative.    Eyes: Negative.    Respiratory: Negative.    Cardiovascular: Negative.    Gastrointestinal: Negative.    Endocrine: Negative.    Genitourinary: Negative.     Musculoskeletal: Negative.    Neurological: Negative.    Hematological: Negative.    Psychiatric/Behavioral: Negative.       A comprehensive 14 point review of systems was performed and was negative except as  "mentioned.      Medications:  The current medication list was reviewed in the EMR    ALLERGIES:    Allergies   Allergen Reactions   • Norvasc [Amlodipine Besylate] Shortness Of Breath     Severe shortness of breath as soon as he takes it   • Other Shortness Of Breath     Pt reports was started on a medication similar to norvasc but can't remember the name but had same reaction to it as he did the norvasc   • Hydrocodone GI Intolerance     Severe constipation; Originally listed as \"other\" with hydrocodone/apap in the comment field.          Physical Exam    VITAL SIGNS:  /74 Comment: LUE  Pulse 72   Temp 97 °F (36.1 °C) (Temporal)   Resp 20   Ht 177.8 cm (70\")   Wt 80.7 kg (178 lb)   BMI 25.54 kg/m²     Wt Readings from Last 3 Encounters:   07/31/19 80.7 kg (178 lb)   07/10/19 79.8 kg (175 lb 14.8 oz)   07/10/19 79.8 kg (176 lb)       Body mass index is 25.54 kg/m². Body surface area is 1.99 meters squared.         Performance Status: 1    General: well appearing, in no acute distress  HEENT: sclera anicteric, oropharynx clear, neck is supple  Lymphatics: no cervical, supraclavicular, or axillary adenopathy  Cardiovascular: regular rate and rhythm, no murmurs, rubs or gallops  Lungs: clear to auscultation bilaterally  Abdomen: soft, nontender, nondistended.  No palpable organomegaly  Extremities: no lower extremity edema  Skin: no rashes, lesions, bruising, or petechiae  Msk:  Shows no weakness of the large muscle groups  Psych: Mood is stable        RECENT LABS:    Lab Results   Component Value Date    HGB 12.3 (L) 07/31/2019    HCT 36.9 (L) 07/31/2019    MCV 95.6 07/31/2019     07/31/2019    WBC 7.80 07/31/2019    NEUTROABS 5.40 07/31/2019    LYMPHSABS 1.60 07/31/2019    MONOSABS 0.80 07/31/2019    EOSABS 0.00 04/18/2019    BASOSABS 0.01 04/18/2019       Lab Results   Component Value Date    GLUCOSE 93 07/10/2019    BUN 15 07/10/2019    CREATININE 0.80 07/10/2019     07/10/2019    K 3.8 " 07/10/2019     07/10/2019    CO2 26.0 07/10/2019    CALCIUM 9.3 07/10/2019    PROTEINTOT 6.5 07/10/2019    ALBUMIN 3.80 07/10/2019    BILITOT 0.7 07/10/2019    ALKPHOS 79 07/10/2019    AST 12 07/10/2019    ALT 9 07/10/2019       Ct Chest Without Contrast    Result Date: 4/11/2019  Large right pleural effusion with intervening opacifications of a largely atelectatic right lung. Narrowing and decreased patency of the right lower lobe bronchus and bronchus intermedius of adjacent compression versus bulk involvement. Opacifications in atelectatic right lower lobe may represent airspace disease versus underlying mass lesion which should be followed.  D:  04/11/2019 E:  04/11/2019  This report was finalized on 4/11/2019 12:40 PM by Dr. Josh Coronado.      Ct Chest With Contrast    Result Date: 5/28/2019  There has been resolution of a large right pleural effusion since the previous examination of 04/11/2019. There are scattered rare subcentimeter pulmonary nodules noted bilaterally. Due to the large effusion on the right is impossible to compare the small nodules on the right. However the small, subcentimeter nodules in the left lung are stable and unchanged.  D:  05/28/2019 E:  05/28/2019   This report was finalized on 5/28/2019 4:39 PM by Dr. Arpit Stout MD.      Mri Brain With & Without Contrast    Result Date: 5/28/2019  No evidence of remaining intracranial metastatic disease or other new disease.  D:  05/28/2019 E:  05/28/2019           Ct Abdomen Pelvis With Contrast    Result Date: 4/15/2019  Disease identified within the right lung base with small right pleural effusion and extensive mass density present. There is no acute intra-abdominal or pelvic abnormality. No metastatic disease is seen within the abdomen or pelvis.  DICTATED:   04/14/2019 EDITED/ls :   04/14/2019  This report was finalized on 4/15/2019 11:49 AM by Dr. Екатерина Vela MD.        Assessment/Plan    1.  Poorly differentiated  neuroendocrine tumor of the lung with brain metastases.  Continue Tecentriq maintenance for now with no changes.  Plan to repeat scans likely in early september.  I will see him back in my clinic in 3 weeks to decide when to do his scans.  He will also need MRI of his brain at that time.    2.  Brain metastases.  Patient was treated with a radiation for that.    3.  Pancytopenia related to chemotherapy.  Seems improved since stopping chemotherapy.          I spent a total of 25 minutes in direct patient care, greater than 20 minutes (greater than 50%) were spent in coordination of care, and counseling the patient regarding  Lung cancer . Answered any questions patient had with medication and plan.      Ninfa Vale MD  [facility]ematology and Oncology    Return on: 08/21/19  Return in (Approximately): 3 weeks, Schedule with next infusion    No orders of the defined types were placed in this encounter.      7/31/2019         Please note that portions of this note may have been completed with a voice recognition program. Efforts were made to edit the dictations, but occasionally words are mistranscribed.

## 2019-08-06 NOTE — TELEPHONE ENCOUNTER
Pt wife left  to call r/t namenda- after discussion with Dr. Guzman I called the pt wife to explain Dr. Guzman usually prescribes it for 3 months but he would be happy to refill if the pt wishes to continue-pt wife states pt wishes to discontinue after today (last dose) and will see Dr. Guzman in September and will discuss then- pt wife notified to call if he decides he wants a refill or with any c/o-verbalized understanding

## 2019-08-22 NOTE — PROGRESS NOTES
PROBLEM LIST:  Oncology/Hematology History    1.  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  2.  Plan for palliative WBRT starting 4/23/2019  3.  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  Patient had Tecentriq added to his regimen with cycle 2.          Neuroendocrine carcinoma of lung (CMS/HCC)    4/14/2019 Initial Diagnosis     Neuroendocrine carcinoma of lung (CMS/HCC)         4/16/2019 -  Chemotherapy     OP LUNG Etoposide / CARBOplatin AUC=5           4/23/2019 - 5/6/2019 Radiation     Radiation OncologyTreatment Course:  Darron Martino received 3000 cGy in 10 fractions to brain via External Beam Radiation - EBRT.         5/8/2019 -  Chemotherapy     OP LUNG Atezolizumab / CARBOplatin AUC=5 / Etoposide (SCLC)              REASON FOR VISIT: Management of my lung cancer    HISTORY OF PRESENT ILLNESS:   87 y.o.  male presents today for follow-up of his lung cancer.  He was initially treated with radiation to the brain due to brain metastases.  He actually is doing reasonably well.  At our last visit I had stopped the chemotherapy and continue the Tecentriq.  Clinically he is tolerating it well.  Denies any rashes.  Still fairly fatigued.     Past medical history, social history and family history was reviewed and unchanged from prior visit.    Review of Systems:    Review of Systems   Constitutional: Positive for appetite change and fatigue.   HENT:  Negative.    Eyes: Negative.    Respiratory: Negative.    Cardiovascular: Negative.    Gastrointestinal: Negative.    Endocrine: Negative.    Genitourinary: Negative.     Musculoskeletal: Negative.    Neurological: Negative.    Hematological: Negative.    Psychiatric/Behavioral: Negative.       A comprehensive 14 point review of systems was performed and was negative except as  "mentioned.      Medications:  The current medication list was reviewed in the EMR    ALLERGIES:    Allergies   Allergen Reactions   • Norvasc [Amlodipine Besylate] Shortness Of Breath     Severe shortness of breath as soon as he takes it   • Other Shortness Of Breath     Pt reports was started on a medication similar to norvasc but can't remember the name but had same reaction to it as he did the norvasc   • Hydrocodone GI Intolerance     Severe constipation; Originally listed as \"other\" with hydrocodone/apap in the comment field.          Physical Exam    VITAL SIGNS:  /65   Pulse 54   Temp 97.4 °F (36.3 °C) (Temporal)   Resp 20   Ht 177.8 cm (70\")   Wt 79.4 kg (175 lb)   SpO2 93% Comment: RA  BMI 25.11 kg/m²     Wt Readings from Last 3 Encounters:   08/22/19 79.4 kg (175 lb)   07/31/19 80.7 kg (178 lb)   07/10/19 79.8 kg (175 lb 14.8 oz)       Body mass index is 25.11 kg/m². Body surface area is 1.97 meters squared.         Performance Status: 1    General: well appearing, in no acute distress  HEENT: sclera anicteric, oropharynx clear, neck is supple  Lymphatics: no cervical, supraclavicular, or axillary adenopathy  Cardiovascular: regular rate and rhythm, no murmurs, rubs or gallops  Lungs: clear to auscultation bilaterally  Abdomen: soft, nontender, nondistended.  No palpable organomegaly  Extremities: no lower extremity edema  Skin: no rashes, lesions, bruising, or petechiae  Msk:  Shows no weakness of the large muscle groups  Psych: Mood is stable        RECENT LABS:    Lab Results   Component Value Date    HGB 12.5 (L) 08/22/2019    HCT 37.6 08/22/2019    MCV 95.9 08/22/2019     08/22/2019    WBC 6.00 08/22/2019    NEUTROABS 3.40 08/22/2019    LYMPHSABS 1.90 08/22/2019    MONOSABS 0.70 08/22/2019    EOSABS 0.00 04/18/2019    BASOSABS 0.01 04/18/2019       Lab Results   Component Value Date    GLUCOSE 110 (H) 07/31/2019    BUN 17 07/31/2019    CREATININE 0.83 07/31/2019     " 07/31/2019    K 4.0 07/31/2019     07/31/2019    CO2 23.0 07/31/2019    CALCIUM 9.5 07/31/2019    PROTEINTOT 6.4 07/31/2019    ALBUMIN 3.60 07/31/2019    BILITOT 0.6 07/31/2019    ALKPHOS 87 07/31/2019    AST 16 07/31/2019    ALT 13 07/31/2019       Ct Chest Without Contrast    Result Date: 4/11/2019  Large right pleural effusion with intervening opacifications of a largely atelectatic right lung. Narrowing and decreased patency of the right lower lobe bronchus and bronchus intermedius of adjacent compression versus bulk involvement. Opacifications in atelectatic right lower lobe may represent airspace disease versus underlying mass lesion which should be followed.  D:  04/11/2019 E:  04/11/2019  This report was finalized on 4/11/2019 12:40 PM by Dr. Josh Coronado.      Ct Chest With Contrast    Result Date: 5/28/2019  There has been resolution of a large right pleural effusion since the previous examination of 04/11/2019. There are scattered rare subcentimeter pulmonary nodules noted bilaterally. Due to the large effusion on the right is impossible to compare the small nodules on the right. However the small, subcentimeter nodules in the left lung are stable and unchanged.  D:  05/28/2019 E:  05/28/2019   This report was finalized on 5/28/2019 4:39 PM by Dr. Arpit Stout MD.      Mri Brain With & Without Contrast    Result Date: 5/28/2019  No evidence of remaining intracranial metastatic disease or other new disease.  D:  05/28/2019 E:  05/28/2019           Ct Abdomen Pelvis With Contrast    Result Date: 4/15/2019  Disease identified within the right lung base with small right pleural effusion and extensive mass density present. There is no acute intra-abdominal or pelvic abnormality. No metastatic disease is seen within the abdomen or pelvis.  DICTATED:   04/14/2019 EDITED/ls :   04/14/2019  This report was finalized on 4/15/2019 11:49 AM by Dr. Екатерина Vela MD.        Assessment/Plan    1.  Poorly  differentiated neuroendocrine tumor of the lung with brain metastases.  Continue Tecentriq maintenance for now with no changes.  Plan to repeat scans with next treatment.  I  He will also need MRI of his brain at that time.    2.  Brain metastases.  Patient was treated with a radiation for that.    3.  Pancytopenia related to chemotherapy.  Seems improved since stopping chemotherapy.          I spent a total of 25 minutes in direct patient care, greater than 20 minutes (greater than 50%) were spent in coordination of care, and counseling the patient regarding  Lung cancer . Answered any questions patient had with medication and plan.      Ninfa Vale MD  [facility] Hematology and Oncology    Return on: 09/13/19  Return in (Approximately): 1 month    Orders Placed This Encounter   Procedures   • Comprehensive metabolic panel   • Comprehensive metabolic panel   • TSH   • T3, Free   • T4, free   • CBC and Differential   • CBC and Differential       8/22/2019         Please note that portions of this note may have been completed with a voice recognition program. Efforts were made to edit the dictations, but occasionally words are mistranscribed.         .more

## 2019-08-26 NOTE — PROGRESS NOTES
The ABCs of the Annual Wellness Visit  Subsequent Medicare Wellness Visit    Chief Complaint   Patient presents with   • Annual Exam     Medicare  Pt is not fasting today       Subjective   History of Present Illness:  Darron Martino is a 87 y.o. male who presents for a Subsequent Medicare Wellness Visit.    HEALTH RISK ASSESSMENT    Recent Hospitalizations:  Recently treated at the following:  [facility]    Current Medical Providers:  Patient Care Team:  Duong Greene MD as PCP - General  Duong Greene MD as PCP - Family Medicine  Duong Greene MD as PCP - Claims Attributed  Ninfa Vale MD as Referring Physician (Medical Oncology)  Jose David Guzman MD as Consulting Physician (Radiation Oncology)    Smoking Status:  Social History     Tobacco Use   Smoking Status Former Smoker   • Packs/day: 2.00   • Years: 48.00   • Pack years: 96.00   • Types: Cigarettes   • Last attempt to quit:    • Years since quittin.6   Smokeless Tobacco Never Used       Alcohol Consumption:  Social History     Substance and Sexual Activity   Alcohol Use Yes    Comment: occasional       Depression Screen:   PHQ-2/PHQ-9 Depression Screening 2019   Little interest or pleasure in doing things 0   Feeling down, depressed, or hopeless 0   Total Score 0       Fall Risk Screen:  STEADI Fall Risk Assessment was completed, and patient is at MODERATE risk for falls. Assessment completed on:2019    Health Habits and Functional and Cognitive Screening:  Functional & Cognitive Status 2019   Dental Exam Up to date   Eye Exam Up to date   Exercise (times per week) 3 times per week   Current Exercise Activities Include Gardening   Do you need help using the phone?  No   Are you deaf or do you have serious difficulty hearing?  Yes   Do you need help with transportation? Yes   Do you need help shopping? No   Do you need help preparing meals?  No   Do you need  help with housework?  No   Do you need help with laundry? No   Do you need help taking your medications? No   Do you need help managing money? No   Do you ever drive or ride in a car without wearing a seat belt? Yes         Does the patient have evidence of cognitive impairment? No    Asprin use counseling:Taking ASA appropriately as indicated    Age-appropriate Screening Schedule:  Refer to the list below for future screening recommendations based on patient's age, sex and/or medical conditions. Orders for these recommended tests are listed in the plan section. The patient has been provided with a written plan.    Health Maintenance   Topic Date Due   • URINE MICROALBUMIN  07/03/1932   • TDAP/TD VACCINES (1 - Tdap) 07/03/1951   • ZOSTER VACCINE (1 of 2) 07/03/1982   • PNEUMOCOCCAL VACCINE (65+ HIGH RISK) (1 of 2 - PCV13) 07/03/1997   • HEMOGLOBIN A1C  08/20/2019   • INFLUENZA VACCINE  08/01/2019   • LIPID PANEL  02/20/2020          The following portions of the patient's history were reviewed and updated as appropriate: allergies, current medications, past family history, past medical history, past social history, past surgical history and problem list.    Outpatient Medications Prior to Visit   Medication Sig Dispense Refill   • aspirin 81 MG tablet Take 1 tablet by mouth Daily. 30 tablet 5   • metoprolol succinate XL (TOPROL XL) 50 MG 24 hr tablet Take 50 mg by mouth Daily.     • nitroglycerin (NITROSTAT) 0.4 MG SL tablet Place 0.4 mg under the tongue Every 5 (Five) Minutes As Needed for Chest Pain. Take no more than 3 doses in 15 minutes.     • polyethylene glycol (MIRALAX) packet Take 17 g by mouth 2 (Two) Times a Day. 500 g 0   • dexamethasone (DECADRON) 4 MG tablet      • Magic mouthwash Radonc swish/swallow/spit 5-10 ml before meals and at bedtime 480 mL 0   • memantine (NAMENDA) 10 MG tablet Take 10 mg by mouth 2 (Two) Times a Day.     • ondansetron (ZOFRAN) 8 MG tablet Take  by mouth Every 8 (Eight) Hours  "As Needed for Nausea or Vomiting.     • ramipril (ALTACE) 5 MG capsule Take 1 capsule by mouth Daily. 90 capsule 3   • traMADol (ULTRAM) 50 MG tablet Take 50 mg by mouth Every 6 (Six) Hours As Needed for Moderate Pain .       No facility-administered medications prior to visit.        Patient Active Problem List   Diagnosis   • Hypertension   • Generalized osteoarthrosis, involving multiple sites   • Lumbosacral spondylosis without myelopathy   • Acquired hyperlipoproteinemia   • CAD s/p MI and CABG in 1993   • Allergic rhinitis   • Varicose veins   • Malignant pleural effusion   • Melanoma resected left arm 2018   • Right lung atelectasis due to large pleural effusion   • Hyponatremia   • Neuroendocrine carcinoma of lung (CMS/HCC)       Advanced Care Planning:  Patient has an advance directive - a copy has been provided and is visible in patient header    Review of Systems    Compared to one year ago, the patient feels his physical health is worse.  Compared to one year ago, the patient feels his mental health is the same.    Reviewed chart for potential of high risk medication in the elderly: not applicable  Reviewed chart for potential of harmful drug interactions in the elderly:not applicable    Objective         Vitals:    08/26/19 1027   BP: 136/62   Pulse: 65   Temp: 97.6 °F (36.4 °C)   SpO2: 98%   Weight: 81.2 kg (179 lb)   Height: 177.8 cm (70\")       Body mass index is 25.68 kg/m².  Discussed the patient's BMI with him. The BMI is in the acceptable range.    Physical Exam    Lab Results   Component Value Date    HGBA1C 5.0 08/26/2019        Assessment/Plan   Medicare Risks and Personalized Health Plan  CMS Preventative Services Quick Reference  Cardiovascular risk  Diabetic Lab Screening     The above risks/problems have been discussed with the patient.  Pertinent information has been shared with the patient in the After Visit Summary.  Follow up plans and orders are seen below in the Assessment/Plan " Section.    Diagnoses and all orders for this visit:    1. Type 2 diabetes mellitus without complication, without long-term current use of insulin (CMS/HCC) (Primary)  -     POC Glycosylated Hemoglobin (Hb A1C)  -     POCT microalbumin    2. Medicare annual wellness visit, subsequent    3. Neuroendocrine carcinoma of lung (CMS/HCC)    4. Metastasis to brain (CMS/HCC)    5. Arteriosclerosis of coronary artery    6. Essential hypertension    7. Degenerative disc disease, thoracic    8. Hyperlipidemia, unspecified hyperlipidemia type    9. Generalized osteoarthrosis, involving multiple sites    10. Impaired glucose tolerance      Follow Up:  Return in about 4 months (around 12/26/2019).     An After Visit Summary and PPPS were given to the patient.

## 2019-09-13 NOTE — PROGRESS NOTES
FOLLOW UP NOTE    PATIENT:                                                      Darron Martino  MEDICAL RECORD #:                        [id number]  :                                                          7/3/1932  COMPLETION DATE:    2019  DIAGNOSIS:     Cancer Staging  Neuroendocrine carcinoma of lung (CMS/HCC)  Staging form: Lung, AJCC 8th Edition  - Clinical: Stage IV (cN3, cM1a) - Signed by Ninfa Vale MD on 2019        BRIEF HISTORY:  Mr. Martino returns to clinic today for follow-up of his extensive stage small cell lung cancer.  Chemotherapy was stopped at the beginning of summer due to pancytopenia that has now recovered, and he is currently receiving Tecentriq maintenance.  He denies any new symptoms other than fatigue and occasional shortness of breath.  He denies headaches or nausea.    MEDICATIONS: Medication reconciliation for the patient was reviewed and confirmed in the electronic medical record.    Review of Systems   Constitutional: Positive for fatigue (getting better).   HENT:   Positive for hearing loss (hearing aids) and tinnitus (occ).    Musculoskeletal: Positive for arthralgias (right leg neuro pain r/t past inguinal hernia surgeryl), back pain and gait problem (walks with cane).        Reports right shoulder pain   Neurological: Positive for gait problem (walks with cane) and headaches (occ).        Reports balance issues   All other systems reviewed and are negative.      KPS 80%    Physical Exam   Constitutional: He is oriented to person, place, and time. He appears well-developed. No distress.   Mildly frail appearing   HENT:   Head: Normocephalic and atraumatic.   Mouth/Throat: Oropharynx is clear and moist.   Eyes: Conjunctivae and EOM are normal. Pupils are equal, round, and reactive to light.   Neck: Normal range of motion. Neck supple.   No palpable adenopathy bilaterally   Cardiovascular: Normal rate and regular rhythm. Exam reveals no friction rub.   No  "murmur heard.  Pulmonary/Chest: Effort normal. He has wheezes.   Diminished breath sounds bilaterally, with occasional end expiratory wheeze bilaterally   Abdominal: Soft. Bowel sounds are normal. He exhibits no distension and no mass. There is no tenderness.   Musculoskeletal: Normal range of motion. He exhibits no edema.   Lymphadenopathy:     He has no cervical adenopathy.   Neurological: He is alert and oriented to person, place, and time. He displays normal reflexes. No cranial nerve deficit or sensory deficit. He exhibits normal muscle tone. Coordination normal.   Mildly weak diffusely in all extremities   Skin: Skin is warm and dry.   Psychiatric: He has a normal mood and affect. His behavior is normal. Judgment and thought content normal.   Nursing note and vitals reviewed.      VITAL SIGNS:   Vitals:    09/13/19 0947   BP: 127/61   Pulse: 69   Resp: 16   Temp: 97.6 °F (36.4 °C)   TempSrc: Temporal   SpO2: 95%  Comment: RA   Weight: 79.1 kg (174 lb 6.4 oz)   Height: 175.3 cm (69\")   PainSc:   4   PainLoc: Comment: right hip       The following portions of the patient's history were reviewed and updated as appropriate: allergies, current medications, past family history, past medical history, past social history, past surgical history and problem list.    IMAGING  I have personally reviewed the relevant imaging studies, as follows:  Ct Abdomen Pelvis Without Contrast    Result Date: 6/22/2019  Infrarenal abdominal aortic aneurysm. There is no acute intra-abdominal or pelvic abnormality identified. Improvement seen in the aeration of the right lung base with only minimal residual scarring and chronic changes present.  DICTATED:   06/21/2019 EDITED/ls :   06/21/2019   This report was finalized on 6/22/2019 12:12 PM by Dr. Екатерина Vela MD.      Ct Chest With Contrast    Result Date: 9/12/2019  Limited examination with small nodular scar identified at the right lung base. Tiny noncalcified nodules seen " scattered throughout the lung fields which are all stable. There is no progression of disease. Abdomen and pelvis is also stable and unremarkable. Stable infrarenal abdominal aortic aneurysm. No evidence of metastatic disease.  D:  09/12/2019 E:  09/12/2019       Ct Chest With Contrast    Result Date: 5/28/2019  There has been resolution of a large right pleural effusion since the previous examination of 04/11/2019. There are scattered rare subcentimeter pulmonary nodules noted bilaterally. Due to the large effusion on the right is impossible to compare the small nodules on the right. However the small, subcentimeter nodules in the left lung are stable and unchanged.  D:  05/28/2019 E:  05/28/2019   This report was finalized on 5/28/2019 4:39 PM by Dr. Arpit Stout MD.      Mri Brain With & Without Contrast    Result Date: 9/12/2019  Chronic-appearing changes of the aging brain. No evidence of intracranial metastatic disease or other acute disease.   DICTATED:   09/11/2019 EDITED/ls :   09/11/2019  This report was finalized on 9/12/2019 3:41 PM by DR. Bret Julien MD.      Mri Brain With & Without Contrast    Result Date: 5/29/2019  No evidence of remaining intracranial metastatic disease or other new disease.  D:  05/28/2019 E:  05/28/2019     This report was finalized on 5/29/2019 10:22 PM by DR. Bret Julien MD.      Ct Abdomen Pelvis With Contrast    Result Date: 9/12/2019  Limited examination with small nodular scar identified at the right lung base. Tiny noncalcified nodules seen scattered throughout the lung fields which are all stable. There is no progression of disease. Abdomen and pelvis is also stable and unremarkable. Stable infrarenal abdominal aortic aneurysm. No evidence of metastatic disease.  D:  09/12/2019 E:  09/12/2019          Darron was seen today for neuroendocrine carcinoma of lung.    Diagnoses and all orders for this visit:    Neuroendocrine carcinoma of lung (CMS/HCC)         IMPRESSION:    Salena is an 87 year old male with extensive stage small cell lung cancer.  He has completed whole brain radiation, initial chemotherapy with Carboplatin and Etoposide, and continues on Tecentriq now for maintenance.  His scans are stable and he appears to be doing reasonably well.  He has lost some weight and his functional status has declined slightly, but he continues on and at this point we really will just need to continue Tecentriq and monitor him closely with repeat scans.  I discussed the role of physical therapy with helping him regain his strength and energy level, but he wasn't very interested.  I scheduled him to return to my clinic in 6 months.    RECOMMENDATIONS:      Return in about 6 months (around 3/13/2020) for Office Visit.    Jose David Guzman MD    Errors in dictation may reflect use of voice recognition software and not all errors in transcription may have been detected prior to signing.

## 2019-09-13 NOTE — PROGRESS NOTES
PROBLEM LIST:  Oncology/Hematology History    1.  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  2.  Plan for palliative WBRT starting 4/23/2019  3.  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  Patient had Tecentriq added to his regimen with cycle 2.          Neuroendocrine carcinoma of lung (CMS/HCC)    4/14/2019 Initial Diagnosis     Neuroendocrine carcinoma of lung (CMS/HCC)         4/16/2019 -  Chemotherapy     OP LUNG Etoposide / CARBOplatin AUC=5           4/23/2019 - 5/6/2019 Radiation     Radiation OncologyTreatment Course:  Darron Martino received 3000 cGy in 10 fractions to brain via External Beam Radiation - EBRT.         5/8/2019 -  Chemotherapy     OP LUNG Atezolizumab / CARBOplatin AUC=5 / Etoposide (SCLC)              REASON FOR VISIT: Management of my lung cancer    HISTORY OF PRESENT ILLNESS:   87 y.o.  male presents today for follow-up of his lung cancer.  He was initially treated with radiation to the brain due to brain metastases.  He actually is doing reasonably well.  At our last visit I had stopped the chemotherapy and continue the Tecentriq.  Clinically he is tolerating it well.  Denies any rashes.  Still fairly fatigued.     Past medical history, social history and family history was reviewed and unchanged from prior visit.    Review of Systems:    Review of Systems   Constitutional: Positive for appetite change and fatigue.   HENT:  Negative.    Eyes: Negative.    Respiratory: Negative.    Cardiovascular: Negative.    Gastrointestinal: Negative.    Endocrine: Negative.    Genitourinary: Negative.     Musculoskeletal: Negative.    Neurological: Negative.    Hematological: Negative.    Psychiatric/Behavioral: Negative.       A comprehensive 14 point review of systems was performed and was negative except as  "mentioned.      Medications:  The current medication list was reviewed in the EMR    ALLERGIES:    Allergies   Allergen Reactions   • Norvasc [Amlodipine Besylate] Shortness Of Breath     Severe shortness of breath as soon as he takes it   • Other Shortness Of Breath     Pt reports was started on a medication similar to norvasc but can't remember the name but had same reaction to it as he did the norvasc   • Hydrocodone GI Intolerance     Severe constipation; Originally listed as \"other\" with hydrocodone/apap in the comment field.          Physical Exam    VITAL SIGNS:  /72 Comment: LUE  Pulse 64   Temp 97.1 °F (36.2 °C) (Temporal)   Resp 20   Ht 175.3 cm (69\")   Wt 78.5 kg (173 lb)   SpO2 95% Comment: RA  BMI 25.55 kg/m²     Wt Readings from Last 3 Encounters:   09/13/19 78.5 kg (173 lb)   09/13/19 79.1 kg (174 lb 6.4 oz)   08/26/19 81.2 kg (179 lb)       Body mass index is 25.55 kg/m². Body surface area is 1.94 meters squared.         Performance Status: 1    General: well appearing, in no acute distress  HEENT: sclera anicteric, oropharynx clear, neck is supple  Lymphatics: no cervical, supraclavicular, or axillary adenopathy  Cardiovascular: regular rate and rhythm, no murmurs, rubs or gallops  Lungs: clear to auscultation bilaterally  Abdomen: soft, nontender, nondistended.  No palpable organomegaly  Extremities: no lower extremity edema  Skin: no rashes, lesions, bruising, or petechiae  Msk:  Shows no weakness of the large muscle groups  Psych: Mood is stable        RECENT LABS:    Lab Results   Component Value Date    HGB 14.4 09/11/2019    HCT 44.9 09/11/2019    MCV 94.5 09/11/2019     09/11/2019    WBC 7.34 09/11/2019    NEUTROABS 4.29 09/11/2019    LYMPHSABS 1.87 09/11/2019    MONOSABS 0.83 09/11/2019    EOSABS 0.26 09/11/2019    BASOSABS 0.07 09/11/2019       Lab Results   Component Value Date    GLUCOSE 87 09/11/2019    BUN 13 09/11/2019    CREATININE 0.93 09/11/2019     " 09/11/2019    K 4.0 09/11/2019     09/11/2019    CO2 28.0 09/11/2019    CALCIUM 9.8 09/11/2019    PROTEINTOT 7.2 09/11/2019    ALBUMIN 4.50 09/11/2019    BILITOT 0.9 09/11/2019    ALKPHOS 85 09/11/2019    AST 21 09/11/2019    ALT 13 09/11/2019       Ct Abdomen Pelvis Without Contrast    Result Date: 6/22/2019  Infrarenal abdominal aortic aneurysm. There is no acute intra-abdominal or pelvic abnormality identified. Improvement seen in the aeration of the right lung base with only minimal residual scarring and chronic changes present.  DICTATED:   06/21/2019 EDITED/ls :   06/21/2019   This report was finalized on 6/22/2019 12:12 PM by Dr. Екатерина Vela MD.      Ct Chest With Contrast    Result Date: 9/12/2019  Limited examination with small nodular scar identified at the right lung base. Tiny noncalcified nodules seen scattered throughout the lung fields which are all stable. There is no progression of disease. Abdomen and pelvis is also stable and unremarkable. Stable infrarenal abdominal aortic aneurysm. No evidence of metastatic disease.  D:  09/12/2019 E:  09/12/2019       Ct Chest With Contrast    Result Date: 5/28/2019  There has been resolution of a large right pleural effusion since the previous examination of 04/11/2019. There are scattered rare subcentimeter pulmonary nodules noted bilaterally. Due to the large effusion on the right is impossible to compare the small nodules on the right. However the small, subcentimeter nodules in the left lung are stable and unchanged.  D:  05/28/2019 E:  05/28/2019   This report was finalized on 5/28/2019 4:39 PM by Dr. Arpit Stout MD.      Mri Brain With & Without Contrast    Result Date: 9/12/2019  Chronic-appearing changes of the aging brain. No evidence of intracranial metastatic disease or other acute disease.   DICTATED:   09/11/2019 EDITED/ls :   09/11/2019  This report was finalized on 9/12/2019 3:41 PM by DR. Bret Julien MD.      Mri Brain With &  Without Contrast    Result Date: 5/29/2019  No evidence of remaining intracranial metastatic disease or other new disease.  D:  05/28/2019 E:  05/28/2019     This report was finalized on 5/29/2019 10:22 PM by DR. Bret Julien MD.      Ct Abdomen Pelvis With Contrast    Result Date: 9/12/2019  Limited examination with small nodular scar identified at the right lung base. Tiny noncalcified nodules seen scattered throughout the lung fields which are all stable. There is no progression of disease. Abdomen and pelvis is also stable and unremarkable. Stable infrarenal abdominal aortic aneurysm. No evidence of metastatic disease.  D:  09/12/2019 E:  09/12/2019             Assessment/Plan    1.  Poorly differentiated neuroendocrine tumor of the lung with brain metastases.  I reviewed his scans prior to this visit.  He has had a very nice response with no sign of progressive disease.  Continue Tecentriq maintenance for now with no changes.     2.  Brain metastases.  Patient was treated with a radiation for that.    3.  Pancytopenia related to chemotherapy.  Seems improved since stopping chemotherapy.          I spent a total of 25 minutes in direct patient care, greater than 20 minutes (greater than 50%) were spent in coordination of care, and counseling the patient regarding  Lung cancer . Answered any questions patient had with medication and plan.      Ninfa Vale MD  [facility] Hematology and Oncology    Return on: 10/25/19  Return in (Approximately): 6 weeks    Orders Placed This Encounter   Procedures   • Comprehensive metabolic panel   • Comprehensive metabolic panel   • TSH   • T3, Free   • T4, free   • CBC and Differential   • CBC and Differential       9/13/2019         Please note that portions of this note may have been completed with a voice recognition program. Efforts were made to edit the dictations, but occasionally words are mistranscribed.         .more

## 2019-10-25 NOTE — PROGRESS NOTES
.more  PROBLEM LIST:  Oncology/Hematology History    1.  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  2.  Plan for palliative WBRT starting 4/23/2019  3.  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  Patient had Tecentriq added to his regimen with cycle 2.          Neuroendocrine carcinoma of lung (CMS/HCC)    4/14/2019 Initial Diagnosis     Neuroendocrine carcinoma of lung (CMS/HCC)         4/16/2019 -  Chemotherapy     OP LUNG Etoposide / CARBOplatin AUC=5           4/23/2019 - 5/6/2019 Radiation     Radiation OncologyTreatment Course:  Darron Martino received 3000 cGy in 10 fractions to brain via External Beam Radiation - EBRT.         5/8/2019 -  Chemotherapy     OP LUNG Atezolizumab / CARBOplatin AUC=5 / Etoposide (SCLC)              REASON FOR VISIT: Management of my lung cancer    HISTORY OF PRESENT ILLNESS:   87 y.o.  male presents today for follow-up of his lung cancer.  He was initially treated with radiation to the brain due to brain metastases.  He actually is doing reasonably well.  He is currently on single agent Tecentriq.  Clinically he is tolerating it well.  Denies any rashes.  Still fairly fatigued.  No major issues with nausea or headaches.    Past medical history, social history and family history was reviewed and unchanged from prior visit.    Review of Systems:    Review of Systems   Constitutional: Positive for appetite change and fatigue.   HENT:  Negative.    Eyes: Negative.    Respiratory: Negative.    Cardiovascular: Negative.    Gastrointestinal: Negative.    Endocrine: Negative.    Genitourinary: Negative.     Musculoskeletal: Negative.    Neurological: Negative.    Hematological: Negative.    Psychiatric/Behavioral: Negative.       A comprehensive 14 point review of systems was performed and was negative except as  "mentioned.      Medications:  The current medication list was reviewed in the EMR    ALLERGIES:    Allergies   Allergen Reactions   • Norvasc [Amlodipine Besylate] Shortness Of Breath     Severe shortness of breath as soon as he takes it   • Other Shortness Of Breath     Pt reports was started on a medication similar to norvasc but can't remember the name but had same reaction to it as he did the norvasc   • Hydrocodone GI Intolerance     Severe constipation; Originally listed as \"other\" with hydrocodone/apap in the comment field.          Physical Exam    VITAL SIGNS:  /71 Comment: LUE  Pulse 72   Temp 97.3 °F (36.3 °C) (Temporal)   Resp 18   Ht 175.3 cm (69\")   Wt 78.5 kg (173 lb)   SpO2 91% Comment: RA  BMI 25.55 kg/m²     Wt Readings from Last 3 Encounters:   10/25/19 78.5 kg (173 lb)   10/04/19 78 kg (172 lb)   09/13/19 78.5 kg (173 lb)       Body mass index is 25.55 kg/m². Body surface area is 1.94 meters squared.         Performance Status: 1    General: well appearing, in no acute distress  HEENT: sclera anicteric, oropharynx clear, neck is supple  Lymphatics: no cervical, supraclavicular, or axillary adenopathy  Cardiovascular: regular rate and rhythm, no murmurs, rubs or gallops  Lungs: clear to auscultation bilaterally  Abdomen: soft, nontender, nondistended.  No palpable organomegaly  Extremities: no lower extremity edema  Skin: no rashes, lesions, bruising, or petechiae  Msk:  Shows no weakness of the large muscle groups  Psych: Mood is stable        RECENT LABS:    Lab Results   Component Value Date    HGB 13.0 10/25/2019    HCT 39.1 10/25/2019    MCV 92.7 10/25/2019     10/25/2019    WBC 6.70 10/25/2019    NEUTROABS 4.40 10/25/2019    LYMPHSABS 1.60 10/25/2019    MONOSABS 0.70 10/25/2019    EOSABS 0.26 09/11/2019    BASOSABS 0.07 09/11/2019       Lab Results   Component Value Date    GLUCOSE 89 10/25/2019    BUN 16 10/25/2019    CREATININE 0.89 10/25/2019     10/25/2019    K " 4.2 10/25/2019     10/25/2019    CO2 25.0 10/25/2019    CALCIUM 9.5 10/25/2019    PROTEINTOT 6.9 10/25/2019    ALBUMIN 4.20 10/25/2019    BILITOT 0.7 10/25/2019    ALKPHOS 90 10/25/2019    AST 19 10/25/2019    ALT 8 10/25/2019       Ct Abdomen Pelvis Without Contrast    Result Date: 6/22/2019  Infrarenal abdominal aortic aneurysm. There is no acute intra-abdominal or pelvic abnormality identified. Improvement seen in the aeration of the right lung base with only minimal residual scarring and chronic changes present.  DICTATED:   06/21/2019 EDITED/ls :   06/21/2019   This report was finalized on 6/22/2019 12:12 PM by Dr. Екатерина Vela MD.      Ct Chest With Contrast    Result Date: 9/12/2019  Limited examination with small nodular scar identified at the right lung base. Tiny noncalcified nodules seen scattered throughout the lung fields which are all stable. There is no progression of disease. Abdomen and pelvis is also stable and unremarkable. Stable infrarenal abdominal aortic aneurysm. No evidence of metastatic disease.  D:  09/12/2019 E:  09/12/2019       Ct Chest With Contrast    Result Date: 5/28/2019  There has been resolution of a large right pleural effusion since the previous examination of 04/11/2019. There are scattered rare subcentimeter pulmonary nodules noted bilaterally. Due to the large effusion on the right is impossible to compare the small nodules on the right. However the small, subcentimeter nodules in the left lung are stable and unchanged.  D:  05/28/2019 E:  05/28/2019   This report was finalized on 5/28/2019 4:39 PM by Dr. Arpit Stout MD.      Mri Brain With & Without Contrast    Result Date: 9/12/2019  Chronic-appearing changes of the aging brain. No evidence of intracranial metastatic disease or other acute disease.   DICTATED:   09/11/2019 EDITED/ls :   09/11/2019  This report was finalized on 9/12/2019 3:41 PM by DR. Bret Julien MD.      Mri Brain With & Without  Contrast    Result Date: 5/29/2019  No evidence of remaining intracranial metastatic disease or other new disease.  D:  05/28/2019 E:  05/28/2019     This report was finalized on 5/29/2019 10:22 PM by DR. Bret Julien MD.      Ct Abdomen Pelvis With Contrast    Result Date: 9/12/2019  Limited examination with small nodular scar identified at the right lung base. Tiny noncalcified nodules seen scattered throughout the lung fields which are all stable. There is no progression of disease. Abdomen and pelvis is also stable and unremarkable. Stable infrarenal abdominal aortic aneurysm. No evidence of metastatic disease.  D:  09/12/2019 E:  09/12/2019             Assessment/Plan    1.  Poorly differentiated neuroendocrine tumor of the lung with brain metastases.  I reviewed his scans prior to this visit.  He has had a very nice response with no sign of progressive disease.  Continue Tecentriq maintenance for now with no changes.     2.  Brain metastases.  Patient was treated with a radiation for that.    3.  Pancytopenia related to chemotherapy.  improved since stopping chemotherapy.          I spent a total of 25 minutes in direct patient care, greater than 20 minutes (greater than 50%) were spent in coordination of care, and counseling the patient regarding  Lung cancer . Answered any questions patient had with medication and plan.      Ninfa Vale MD  [facility] Hematology and Oncology    Return on: 11/15/19  Return in (Approximately): 3 weeks    Orders Placed This Encounter   Procedures   • Comprehensive metabolic panel   • CBC and Differential       10/25/2019         Please note that portions of this note may have been completed with a voice recognition program. Efforts were made to edit the dictations, but occasionally words are mistranscribed.         .more

## 2019-11-04 NOTE — PROGRESS NOTES
Discharge Planning Assessment  [facility]     Patient Name: Darron Martino  MRN: [id number]  Today's Date: 11/4/2019    Admit Date: 11/4/2019    Discharge Needs Assessment     Row Name 11/04/19 1307       Living Environment    Lives With  spouse    Name(s) of Who Lives With Patient  Lives with spouse, Yamilet Martino @ [phone], in [place]    Current Living Arrangements  home/apartment/condo    Primary Care Provided by  self;spouse/significant other    Provides Primary Care For  no one    Caregiving Concerns  NA-spouse available to assist as needed    Family Caregiver if Needed  none    Quality of Family Relationships  involved;helpful;supportive    Able to Return to Prior Arrangements  no    Living Arrangement Comments  Resides in private residence with spouse       Resource/Environmental Concerns    Resource/Environmental Concerns  none    Transportation Concerns  car, none       Transition Planning    Patient/Family Anticipates Transition to  home    Patient/Family Anticipated Services at Transition  none;    Transportation Anticipated  family or friend will provide       Discharge Needs Assessment    Readmission Within the Last 30 Days  no previous admission in last 30 days    Concerns to be Addressed  no discharge needs identified;denies needs/concerns at this time    Equipment Currently Used at Home  oxygen    Anticipated Changes Related to Illness  none    Equipment Needed After Discharge  oxygen    Offered/Gave Vendor List  yes    Patient's Choice of Community Agency(s)  Currently with home O2 in place, unsure of provider    Discharge Coordination/Progress  Anticipate discharge home with spouse when medically stable.        Discharge Plan     Row Name 11/04/19 1312       Plan    Plan Home with spouse    Patient/Family in Agreement with Plan  -- Patient/spouse    Plan Comments Met with patient/spouse at bedside, report independent at home with no current needs.  Currently with home 02 in  place, unsure of provider-happy with services.  Aware Case Management available should needs arise, appreciative of visit.     Final Discharge Disposition Code 01 - home or self-care        Destination      No service coordination in this encounter.      Durable Medical Equipment      No service coordination in this encounter.      Dialysis/Infusion      No service coordination in this encounter.      Home Medical Care      No service coordination in this encounter.      Therapy      No service coordination in this encounter.      Community Resources      No service coordination in this encounter.          Demographic Summary     Row Name 11/04/19 4371       General Information    Arrived From  emergency department    Referral Source  emergency department    Reason for Consult  discharge planning    Preferred Language  English     Used During This Interaction  no        Functional Status    No documentation.       Psychosocial    No documentation.       Abuse/Neglect    No documentation.       Legal    No documentation.       Substance Abuse    No documentation.       Patient Forms    No documentation.           MARSHALL Peña

## 2019-11-04 NOTE — ED PROVIDER NOTES
Subjective   Darron Martino is an 87 y.o.male who presents to the ED with complaints of a productive cough. The patient has been experiencing a cough for the past five days. He tried taking Mucinex yesterday, which did provide him with mild relief. Today, his cough returned, and it was producing a bloody sputum. He also complains of shortness of breath and right chest pain that is exacerbated when he coughs. He denies any abdominal pain, vomiting, fever, chills, rhinorrhea, congestion, sore throat, or diarrhea. Pt takes no other blood thinners other than daily 81mg ASA. Pt with hx of lung cancer with brain mets. Pt states last chemo was in July and was taken off due to low platelets counts. He currently is on Tecentriq every 3 weeks.  and followed by Dr. Zaragoza. He denies recent antibiotics. Although, he stopped the chemo in January. He is currently undergoing amino therapy. He denies any history of DVT or PE. PMHx significant for HTN, Arthritis, CAD, and lung cancer. Quit tobacco use 1992.   There are no other complaints at this time.         History provided by:  Patient  Cough   Cough characteristics:  Productive  Sputum characteristics:  Bloody  Severity:  Moderate  Onset quality:  Sudden  Duration:  5 days  Timing:  Constant  Progression:  Worsening  Chronicity:  New  Smoker: no    Relieved by: Mucinex.  Worsened by:  Nothing  Associated symptoms: chest pain and shortness of breath    Associated symptoms: no chills, no fever, no rhinorrhea and no sore throat        Review of Systems   Constitutional: Negative for chills and fever.   HENT: Negative for congestion, rhinorrhea and sore throat.    Respiratory: Positive for cough and shortness of breath.    Cardiovascular: Positive for chest pain.   Gastrointestinal: Negative for abdominal pain, diarrhea and vomiting.   All other systems reviewed and are negative.      Past Medical History:   Diagnosis Date   • Abnormal glucose    • Acquired hyperlipoproteinemia   "  • Acute bronchitis    • Allergic rhinitis    • Arteriosclerosis of coronary artery    • Cellulitis and abscess    • Cough    • Edema extremities    • Elevated PSA    • Encounter for prostate cancer screening    • Generalized osteoarthrosis, involving multiple sites    • Hypertension    • Lumbosacral spondylosis without myelopathy    • Pain in joint, pelvic region and thigh    • Preventative health care    • Right groin pain    • Transient cerebral ischemia    • Ulnar neuropathy    • Upper back pain on right side    • UTI (urinary tract infection)    • Varicose veins        Allergies   Allergen Reactions   • Norvasc [Amlodipine Besylate] Shortness Of Breath     Severe shortness of breath as soon as he takes it   • Other Shortness Of Breath     Pt reports was started on a medication similar to norvasc but can't remember the name but had same reaction to it as he did the norvasc   • Hydrocodone GI Intolerance     Severe constipation; Originally listed as \"other\" with hydrocodone/apap in the comment field.        Past Surgical History:   Procedure Laterality Date   • CARDIAC SURGERY     • INGUINAL HERNIA REPAIR     • LEG SURGERY         Family History   Problem Relation Age of Onset   • Hypertension Mother    • Arthritis Father        Social History     Socioeconomic History   • Marital status:      Spouse name: Not on file   • Number of children: Not on file   • Years of education: Not on file   • Highest education level: Not on file   Tobacco Use   • Smoking status: Former Smoker     Packs/day: 2.00     Years: 48.00     Pack years: 96.00     Types: Cigarettes     Last attempt to quit:      Years since quittin.8   • Smokeless tobacco: Never Used   Substance and Sexual Activity   • Alcohol use: Yes     Comment: occasional   • Drug use: No   • Sexual activity: No         Objective   Physical Exam   Constitutional: He is oriented to person, place, and time. He appears well-developed and well-nourished. "   HENT:   Head: Normocephalic and atraumatic.   Eyes: Conjunctivae are normal. No scleral icterus.   Neck: Normal range of motion. Neck supple.   Cardiovascular: Normal rate, regular rhythm, normal heart sounds and intact distal pulses.   No murmur heard.  Pulmonary/Chest: Effort normal and breath sounds normal. No respiratory distress.   Abdominal: Soft. Bowel sounds are normal. There is no tenderness.   Musculoskeletal: Normal range of motion. He exhibits no edema.   Neurological: He is alert and oriented to person, place, and time.   Skin: Skin is warm and dry.   Psychiatric: He has a normal mood and affect. His behavior is normal.   Nursing note and vitals reviewed.      Procedures         ED Course  ED Course as of Nov 04 2226   Mon Nov 04, 2019   1435 Paged Dr. Zaragoza to discuss CTA chest findings  [RT]   1510 Discussed patient with Dr. Zaragoza. Went over CTA findings. He will f/u with patient in the office later this week and wants patient placed on Doxycyline.   [RT]      ED Course User Index  [RT] Hannah Montoya PA     Re-examined patient several times in ED. Pt resting, no distress, vitals stable. Discussed tx plan with patient and family.     Discussed patient with Dr. Hernandez who is agreeable with ED course.     Reviewed old records.     Recent Results (from the past 24 hour(s))   Protime-INR    Collection Time: 11/04/19 11:53 AM   Result Value Ref Range    Protime 13.2 11.2 - 14.3 Seconds    INR 1.05 0.85 - 1.16   Comprehensive Metabolic Panel    Collection Time: 11/04/19 11:53 AM   Result Value Ref Range    Glucose 92 65 - 99 mg/dL    BUN 14 8 - 23 mg/dL    Creatinine 0.88 0.76 - 1.27 mg/dL    Sodium 139 136 - 145 mmol/L    Potassium 4.3 3.5 - 5.2 mmol/L    Chloride 102 98 - 107 mmol/L    CO2 26.0 22.0 - 29.0 mmol/L    Calcium 9.6 8.6 - 10.5 mg/dL    Total Protein 6.9 6.0 - 8.5 g/dL    Albumin 3.80 3.50 - 5.20 g/dL    ALT (SGPT) 8 1 - 41 U/L    AST (SGOT) 25 1 - 40 U/L    Alkaline Phosphatase 98 39 - 117 U/L     Total Bilirubin 1.0 0.2 - 1.2 mg/dL    eGFR Non African Amer 82 >60 mL/min/1.73    Globulin 3.1 gm/dL    A/G Ratio 1.2 g/dL    BUN/Creatinine Ratio 15.9 7.0 - 25.0    Anion Gap 11.0 5.0 - 15.0 mmol/L   BNP    Collection Time: 11/04/19 11:53 AM   Result Value Ref Range    proBNP 182.5 5.0-1,800.0 pg/mL   Troponin    Collection Time: 11/04/19 11:53 AM   Result Value Ref Range    Troponin T <0.010 0.000 - 0.030 ng/mL   CBC Auto Differential    Collection Time: 11/04/19 11:53 AM   Result Value Ref Range    WBC 6.43 3.40 - 10.80 10*3/mm3    RBC 4.43 4.14 - 5.80 10*6/mm3    Hemoglobin 13.1 13.0 - 17.7 g/dL    Hematocrit 40.1 37.5 - 51.0 %    MCV 90.5 79.0 - 97.0 fL    MCH 29.6 26.6 - 33.0 pg    MCHC 32.7 31.5 - 35.7 g/dL    RDW 13.9 12.3 - 15.4 %    RDW-SD 46.5 37.0 - 54.0 fl    MPV 8.8 6.0 - 12.0 fL    Platelets 166 140 - 450 10*3/mm3    Neutrophil % 65.1 42.7 - 76.0 %    Lymphocyte % 19.1 (L) 19.6 - 45.3 %    Monocyte % 12.8 (H) 5.0 - 12.0 %    Eosinophil % 2.2 0.3 - 6.2 %    Basophil % 0.5 0.0 - 1.5 %    Immature Grans % 0.3 0.0 - 0.5 %    Neutrophils, Absolute 4.19 1.70 - 7.00 10*3/mm3    Lymphocytes, Absolute 1.23 0.70 - 3.10 10*3/mm3    Monocytes, Absolute 0.82 0.10 - 0.90 10*3/mm3    Eosinophils, Absolute 0.14 0.00 - 0.40 10*3/mm3    Basophils, Absolute 0.03 0.00 - 0.20 10*3/mm3    Immature Grans, Absolute 0.02 0.00 - 0.05 10*3/mm3    nRBC 0.0 0.0 - 0.2 /100 WBC   Light Blue Top    Collection Time: 11/04/19 11:53 AM   Result Value Ref Range    Extra Tube hold for add-on    Green Top (Gel)    Collection Time: 11/04/19 11:53 AM   Result Value Ref Range    Extra Tube Hold for add-ons.    Lavender Top    Collection Time: 11/04/19 11:53 AM   Result Value Ref Range    Extra Tube hold for add-on    Gold Top - SST    Collection Time: 11/04/19 11:53 AM   Result Value Ref Range    Extra Tube Hold for add-ons.    Influenza Antigen, Rapid - Swab, Nasopharynx    Collection Time: 11/04/19 12:15 PM   Result Value Ref Range     Influenza A Ag, EIA Negative Negative    Influenza B Ag, EIA Negative Negative     Note: In addition to lab results from this visit, the labs listed above may include labs taken at another facility or during a different encounter within the last 24 hours. Please correlate lab times with ED admission and discharge times for further clarification of the services performed during this visit.    XR Chest 2 View   Preliminary Result   Ill-defined opacification seen at the right lung base   suggesting infiltrate with small right pleural effusion. Mild increased   markings stable within the right upper lobe.       D:  11/04/2019   E:  11/04/2019              CT Angiogram Chest   Preliminary Result   New adenopathy in the pericardial region with mass like   density filling the right lower lobe. There is fluid along the right   fissure and tiny pleural effusion present. Findings suggesting   progression of disease with adenopathy seen in the right hilar region.   There are pulmonary nodule seen at the left lung base also suggesting   progression of disease. There is no filling defect to suggest evidence   of pulmonary embolism.                Vitals:    11/04/19 1400 11/04/19 1430 11/04/19 1500 11/04/19 1538   BP: 125/68 131/64 116/58 134/76   BP Location:       Patient Position:       Pulse:  67 66 67   Resp:    16   Temp:       TempSrc:       SpO2: 93% 95% 94% 94%   Weight:       Height:         Medications   iopamidol (ISOVUE-370) 76 % injection 100 mL (100 mL Intravenous Given 11/4/19 1358)     ECG/EMG Results (last 24 hours)     Procedure Component Value Units Date/Time    ECG 12 Lead [[id number]] Collected:  11/04/19 1113     Updated:  11/04/19 1114        ECG 12 Lead   Final Result   Test Reason : CP   Blood Pressure : **/** mmHG   Vent. Rate : 066 BPM     Atrial Rate : 066 BPM      P-R Int : 152 ms          QRS Dur : 080 ms       QT Int : 402 ms       P-R-T Axes : -10 -05 025 degrees      QTc Int : 421 ms      Normal  sinus rhythm with sinus arrhythmia   Inferior infarct , age undetermined   Abnormal ECG   When compared with ECG of 11-APR-2019 08:34,   Vent. rate has decreased BY  38 BPM   Confirmed by ALEJANDRA GARRISON ([id number]) on 11/4/2019 10:15:48 PM      Referred By:  BLADIMIR           Confirmed By:ALEJANDRA GARRISON                          [facility]    Final diagnoses:   Bronchitis   Malignant neoplasm of right lung, unspecified part of lung (CMS/HCC)       Documentation assistance provided by jacquie Rojas.  Information recorded by the scribe was done at my direction and has been verified and validated by me.     Riccardo Rojas  11/04/19 1135       Hannah Montoya PA  11/04/19 5477

## 2019-11-08 NOTE — PROGRESS NOTES
.more  PROBLEM LIST:  Oncology/Hematology History    1.  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  2.  Plan for palliative WBRT starting 4/23/2019  3.  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  Patient had Tecentriq added to his regimen with cycle 2.          Neuroendocrine carcinoma of lung (CMS/HCC)    4/14/2019 Initial Diagnosis     Neuroendocrine carcinoma of lung (CMS/HCC)         4/16/2019 -  Chemotherapy     OP LUNG Etoposide / CARBOplatin AUC=5           4/23/2019 - 5/6/2019 Radiation     Radiation OncologyTreatment Course:  Darron Martino received 3000 cGy in 10 fractions to brain via External Beam Radiation - EBRT.         5/8/2019 -  Chemotherapy     OP LUNG Atezolizumab / CARBOplatin AUC=5 / Etoposide (SCLC)              REASON FOR VISIT: Management of my lung cancer    HISTORY OF PRESENT ILLNESS:   87 y.o.  male presents today for follow-up of his lung cancer.  He was initially treated with radiation to the brain due to brain metastases.  Since I last saw him he ended up in the ER with hemoptysis and shortness of breath.  They performed a CAT scan of his chest which showed progressive disease in his lung.  He presents today to discuss options going forward.  He denies any major issues with infections.  No fevers.      Past medical history, social history and family history was reviewed and unchanged from prior visit.    Review of Systems:    Review of Systems   Constitutional: Positive for appetite change and fatigue.   HENT:  Negative.    Eyes: Negative.    Respiratory: Positive for hemoptysis and shortness of breath.    Cardiovascular: Negative.    Gastrointestinal: Negative.    Endocrine: Negative.    Genitourinary: Negative.     Musculoskeletal: Negative.    Neurological: Negative.    Hematological: Negative.   "  Psychiatric/Behavioral: Negative.       A comprehensive 14 point review of systems was performed and was negative except as mentioned.      Medications:  The current medication list was reviewed in the EMR    ALLERGIES:    Allergies   Allergen Reactions   • Norvasc [Amlodipine Besylate] Shortness Of Breath     Severe shortness of breath as soon as he takes it   • Other Shortness Of Breath     Pt reports was started on a medication similar to norvasc but can't remember the name but had same reaction to it as he did the norvasc   • Hydrocodone GI Intolerance     Severe constipation; Originally listed as \"other\" with hydrocodone/apap in the comment field.          Physical Exam    VITAL SIGNS:  /63 Comment: LUE  Pulse 66   Temp 97.4 °F (36.3 °C) (Temporal)   Resp 24   Ht 177.8 cm (70\")   Wt 77.6 kg (171 lb)   SpO2 96% Comment: RA  BMI 24.54 kg/m²     Wt Readings from Last 3 Encounters:   11/08/19 77.6 kg (171 lb)   11/04/19 76.2 kg (168 lb)   10/25/19 78.5 kg (173 lb)       Body mass index is 24.54 kg/m². Body surface area is 1.95 meters squared.         Performance Status: 1    General: well appearing, in no acute distress  HEENT: sclera anicteric, oropharynx clear, neck is supple  Lymphatics: no cervical, supraclavicular, or axillary adenopathy  Cardiovascular: regular rate and rhythm, no murmurs, rubs or gallops  Lungs: clear to auscultation bilaterally  Abdomen: soft, nontender, nondistended.  No palpable organomegaly  Extremities: no lower extremity edema  Skin: no rashes, lesions, bruising, or petechiae  Msk:  Shows no weakness of the large muscle groups  Psych: Mood is stable        RECENT LABS:    Lab Results   Component Value Date    HGB 13.1 11/04/2019    HCT 40.1 11/04/2019    MCV 90.5 11/04/2019     11/04/2019    WBC 6.43 11/04/2019    NEUTROABS 4.19 11/04/2019    LYMPHSABS 1.23 11/04/2019    MONOSABS 0.82 11/04/2019    EOSABS 0.14 11/04/2019    BASOSABS 0.03 11/04/2019       Lab " Results   Component Value Date    GLUCOSE 92 11/04/2019    BUN 14 11/04/2019    CREATININE 0.88 11/04/2019     11/04/2019    K 4.3 11/04/2019     11/04/2019    CO2 26.0 11/04/2019    CALCIUM 9.6 11/04/2019    PROTEINTOT 6.9 11/04/2019    ALBUMIN 3.80 11/04/2019    BILITOT 1.0 11/04/2019    ALKPHOS 98 11/04/2019    AST 25 11/04/2019    ALT 8 11/04/2019       Ct Abdomen Pelvis Without Contrast    Result Date: 6/22/2019  Infrarenal abdominal aortic aneurysm. There is no acute intra-abdominal or pelvic abnormality identified. Improvement seen in the aeration of the right lung base with only minimal residual scarring and chronic changes present.  DICTATED:   06/21/2019 EDITED/ls :   06/21/2019   This report was finalized on 6/22/2019 12:12 PM by Dr. Екатерина Vela MD.      Ct Chest With Contrast    Result Date: 9/12/2019  Limited examination with small nodular scar identified at the right lung base. Tiny noncalcified nodules seen scattered throughout the lung fields which are all stable. There is no progression of disease. Abdomen and pelvis is also stable and unremarkable. Stable infrarenal abdominal aortic aneurysm. No evidence of metastatic disease.  D:  09/12/2019 E:  09/12/2019       Ct Chest With Contrast    Result Date: 5/28/2019  There has been resolution of a large right pleural effusion since the previous examination of 04/11/2019. There are scattered rare subcentimeter pulmonary nodules noted bilaterally. Due to the large effusion on the right is impossible to compare the small nodules on the right. However the small, subcentimeter nodules in the left lung are stable and unchanged.  D:  05/28/2019 E:  05/28/2019   This report was finalized on 5/28/2019 4:39 PM by Dr. Arpit Stout MD.      Mri Brain With & Without Contrast    Result Date: 9/12/2019  Chronic-appearing changes of the aging brain. No evidence of intracranial metastatic disease or other acute disease.   DICTATED:   09/11/2019  EDITED/ls :   09/11/2019  This report was finalized on 9/12/2019 3:41 PM by DR. Bret Julien MD.      Mri Brain With & Without Contrast    Result Date: 5/29/2019  No evidence of remaining intracranial metastatic disease or other new disease.  D:  05/28/2019 E:  05/28/2019     This report was finalized on 5/29/2019 10:22 PM by DR. Bret Julien MD.      Ct Abdomen Pelvis With Contrast    Result Date: 9/12/2019  Limited examination with small nodular scar identified at the right lung base. Tiny noncalcified nodules seen scattered throughout the lung fields which are all stable. There is no progression of disease. Abdomen and pelvis is also stable and unremarkable. Stable infrarenal abdominal aortic aneurysm. No evidence of metastatic disease.  D:  09/12/2019 E:  09/12/2019             Assessment/Plan    1.  Poorly differentiated neuroendocrine tumor of the lung with brain metastases.  He clearly has progression of disease in his lungs.  I have given him 2 options going forward.  The first option is consider palliative care and hospice versus single agent Taxol.  I think treatment in the second line setting in a person his 87 years old has minimal benefit.  Though the patient's performance status is reasonable and if he wants to proceed with weekly Taxol I think that would be okay.  I have asked him to take a week to think about whether he truly wants to undergo treatment or not.  He understands the implications of progression.  And I will see him back in my clinic in 1 week for it.    2.  Brain metastases.  Patient was treated with a radiation for that.      I spent a total of 40 minutes in direct patient care, greater than 30 minutes (greater than 50%) were spent in coordination of care, and counseling the patient regarding  Lung cancer . Answered any questions patient had with medication and plan.      Ninfa Vale MD  [facility] Hematology and Oncology    Return in (Approximately): 1 week, Schedule with next  infusion    Orders Placed This Encounter   Procedures   • Comprehensive metabolic panel   • CBC and Differential   • CBC and Differential   • CBC and Differential       11/8/2019         Please note that portions of this note may have been completed with a voice recognition program. Efforts were made to edit the dictations, but occasionally words are mistranscribed.         .more

## 2019-11-15 NOTE — PLAN OF CARE
Outpatient Infusion • [address] • [address] • [place] [id number] • [phone]      CHEMOTHERAPY EDUCATION SHEET    NAME:  Darron Martino      : 7/3/1932           DATE: 11/15/19    Booklets Given: Chemotherapy and You []  Eating Hints []    Sexuality/Fertility Books []     Chemotherapy/Biotherapy Education Sheets: (list all that apply)  Paclitaxel     Chemotherapy Regimen: Paclitaxel q2 weeks 1 off for cycle 1, Paclitaxel [id number] 1 off for cycles 2+    TOPICS EDUCATION PROVIDED EDUCATION REINFORCED COMMENTS   ANEMIA:  role of RBC, cause, s/s, ways to manage, role of transfusion [x] [] Counseled patient on roles of rbc   THROMBOCYTOPENIA:  role of platelet, cause, s/s, ways to prevent bleeding, things to avoid, when to seek help [x] [] Counseled patient on roles of platelets   NEUTROPENIA:  role of WBC, cause, infection precautions, s/s of infection, when to call MD [x] [] Counseled patients on role of WBC, family has received flu shots   NUTRITION & APPETITE CHANGES:  importance of maintaining healthy diet & weight, ways to manage to improve intake, dietary consult, exercise regimen [x] [] Counseled patient to maintain a healthy diet   DIARRHEA:  causes, s/s of dehydration, ways to manage, dietary changes, when to call MD [x] [] Counseled patient on using loperamide for loose stools   CONSTIPATION:  causes, ways to manage, dietary changes, when to call MD [x] [] Counseled patient to stay hydrated and eat fiber   NAUSEA & VOMITING:  cause, use of antiemetics, dietary changes, when to call MD [x] [] Counseled patient to take ondansetron for N/V up to three times daily.    MOUTH SORES:  causes, oral care, ways to manage [x] [] Counseled patient on good oral hygiene. They confirmed they use magic mouthwash   ALOPECIA:  cause, ways to manage, resources [x] [] Counseled patient on potential hair loss   INFERTILITY & SEXUALITY:  causes, fertility preservation options, sexuality changes, ways to  manage, importance of birth control [x] [] Counseled patient on safe sexual practice   NERVOUS SYSTEM CHANGES:  causes, s/s, neuropathies, cognitive changes, ways to manage [] []    PAIN:  causes, ways to manage [] [] ????   SKIN & NAIL CHANGES:  cause, s/s, ways to manage [] []    ORGAN TOXICITIES:  cause, s/s, need for diagnostic tests, labs, when to notify MD [] []    SURVIVORSHIP:  distress, distress assessment, secondary malignancies, early/late effects, follow-up, social issues, social support [] []    HOME CARE:  use of spill kits, storing of PO chemo, how to manage bodily fluids [] [x] Reinforced education on how to use bathroom properly    MISCELLANEOUS:  drug interactions, administration, vesicant, et [x] [] Counseled patient to notify nurse immediately if sudden changes in behavior during infusion     Referrals:      Notes:   Met with patient and family member Yamilet for 20 minutes to discuss side effects of medication

## 2019-11-15 NOTE — PROGRESS NOTES
ONC Nutrition    Diagnosis:  Neuroendocrine lung with brain mets;  recent progression of disease  Treatment:  Prior chemo treatment plan & whole brain for brain mets; Treatment plan change to 4 cycles Taxol (28 day cycle) 11/15 for disease progression    Weight 172 lbs / weight has remained stable    Consulted with patient and wife during infusion visit today.  Patient states that he continues to have a positive approach, despite disease progression.  He states that appetite has waned over the past few days and intake has been affected by taste changes.  He states that he realizes the importance of nutrition with diagnosis and treatment and strives to maintain good intake, even on days when he doesn't want to eat.     Encouraged patient to use the baking soda, salt and water mouth rinses throughout the day for cleansing the mouth and enhancing taste buds.  Discussed in detail various suggestions for enhancing the taste of foods with marinades, condiments, herbs / spices, vinegars, etc; discussed tips for dry mouth management.  Encouraged adequate hydration.    Will continue to follow as needed.

## 2019-11-15 NOTE — PROGRESS NOTES
.more  PROBLEM LIST:  Oncology/Hematology History    1.  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  2.  Plan for palliative WBRT starting 4/23/2019  3.  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  Patient had Tecentriq added to his regimen with cycle 2.          Neuroendocrine carcinoma of lung (CMS/HCC)    4/14/2019 Initial Diagnosis     Neuroendocrine carcinoma of lung (CMS/HCC)         4/16/2019 -  Chemotherapy     OP LUNG Etoposide / CARBOplatin AUC=5           4/23/2019 - 5/6/2019 Radiation     Radiation OncologyTreatment Course:  Darron Martino received 3000 cGy in 10 fractions to brain via External Beam Radiation - EBRT.         5/8/2019 - 11/8/2019 Chemotherapy     OP LUNG Atezolizumab / CARBOplatin AUC=5 / Etoposide (SCLC)           11/15/2019 -  Chemotherapy     OP LUNG PACLitaxel 80 mg/m2 D1,8,15              REASON FOR VISIT: Management of my lung cancer    HISTORY OF PRESENT ILLNESS:   87 y.o.  male presents today for follow-up of his lung cancer.  He was initially treated with radiation to the brain due to brain metastases.  He has progressed on first-line therapy.  He presents today to start second line therapy with single agent Taxol.  Clinically he is actually fairly asymptomatic.  Denies any major issues with infections.  Denies any cough.  Continues to have some shortness of breath and fatigue.  His performance status is actually fairly reasonable for his age.      Past medical history, social history and family history was reviewed and unchanged from prior visit.    Review of Systems:    Review of Systems   Constitutional: Positive for appetite change and fatigue.   HENT:  Negative.    Eyes: Negative.    Respiratory: Positive for hemoptysis and shortness of breath.    Cardiovascular: Negative.    Gastrointestinal: Negative.   "  Endocrine: Negative.    Genitourinary: Negative.     Musculoskeletal: Negative.    Neurological: Negative.    Hematological: Negative.    Psychiatric/Behavioral: Negative.       A comprehensive 14 point review of systems was performed and was negative except as mentioned.      Medications:  The current medication list was reviewed in the EMR    ALLERGIES:    Allergies   Allergen Reactions   • Norvasc [Amlodipine Besylate] Shortness Of Breath     Severe shortness of breath as soon as he takes it   • Other Shortness Of Breath     Pt reports was started on a medication similar to norvasc but can't remember the name but had same reaction to it as he did the norvasc   • Hydrocodone GI Intolerance     Severe constipation; Originally listed as \"other\" with hydrocodone/apap in the comment field.          Physical Exam    VITAL SIGNS:  /84   Pulse 69   Temp 97.6 °F (36.4 °C) (Temporal)   Resp 18   Ht 177.8 cm (70\")   Wt 78 kg (172 lb)   SpO2 94% Comment: RA  BMI 24.68 kg/m²     Wt Readings from Last 3 Encounters:   11/15/19 78 kg (172 lb)   11/08/19 77.6 kg (171 lb)   11/04/19 76.2 kg (168 lb)       Body mass index is 24.68 kg/m². Body surface area is 1.96 meters squared.         Performance Status: 1    General: well appearing, in no acute distress  HEENT: sclera anicteric, oropharynx clear, neck is supple  Lymphatics: no cervical, supraclavicular, or axillary adenopathy  Cardiovascular: regular rate and rhythm, no murmurs, rubs or gallops  Lungs: clear to auscultation bilaterally  Abdomen: soft, nontender, nondistended.  No palpable organomegaly  Extremities: no lower extremity edema  Skin: no rashes, lesions, bruising, or petechiae  Msk:  Shows no weakness of the large muscle groups  Psych: Mood is stable        RECENT LABS:    Lab Results   Component Value Date    HGB 13.1 11/04/2019    HCT 40.1 11/04/2019    MCV 90.5 11/04/2019     11/04/2019    WBC 6.43 11/04/2019    NEUTROABS 4.19 11/04/2019    " LYMPHSABS 1.23 11/04/2019    MONOSABS 0.82 11/04/2019    EOSABS 0.14 11/04/2019    BASOSABS 0.03 11/04/2019       Lab Results   Component Value Date    GLUCOSE 92 11/04/2019    BUN 14 11/04/2019    CREATININE 0.88 11/04/2019     11/04/2019    K 4.3 11/04/2019     11/04/2019    CO2 26.0 11/04/2019    CALCIUM 9.6 11/04/2019    PROTEINTOT 6.9 11/04/2019    ALBUMIN 3.80 11/04/2019    BILITOT 1.0 11/04/2019    ALKPHOS 98 11/04/2019    AST 25 11/04/2019    ALT 8 11/04/2019       Ct Abdomen Pelvis Without Contrast    Result Date: 6/22/2019  Infrarenal abdominal aortic aneurysm. There is no acute intra-abdominal or pelvic abnormality identified. Improvement seen in the aeration of the right lung base with only minimal residual scarring and chronic changes present.  DICTATED:   06/21/2019 EDITED/ls :   06/21/2019   This report was finalized on 6/22/2019 12:12 PM by Dr. Екатерина Vela MD.      Ct Chest With Contrast    Result Date: 9/12/2019  Limited examination with small nodular scar identified at the right lung base. Tiny noncalcified nodules seen scattered throughout the lung fields which are all stable. There is no progression of disease. Abdomen and pelvis is also stable and unremarkable. Stable infrarenal abdominal aortic aneurysm. No evidence of metastatic disease.  D:  09/12/2019 E:  09/12/2019       Ct Chest With Contrast    Result Date: 5/28/2019  There has been resolution of a large right pleural effusion since the previous examination of 04/11/2019. There are scattered rare subcentimeter pulmonary nodules noted bilaterally. Due to the large effusion on the right is impossible to compare the small nodules on the right. However the small, subcentimeter nodules in the left lung are stable and unchanged.  D:  05/28/2019 E:  05/28/2019   This report was finalized on 5/28/2019 4:39 PM by Dr. Arpit Stout MD.      Mri Brain With & Without Contrast    Result Date: 9/12/2019  Chronic-appearing changes of  the aging brain. No evidence of intracranial metastatic disease or other acute disease.   DICTATED:   09/11/2019 EDITED/ls :   09/11/2019  This report was finalized on 9/12/2019 3:41 PM by DR. Bret Julien MD.      Mri Brain With & Without Contrast    Result Date: 5/29/2019  No evidence of remaining intracranial metastatic disease or other new disease.  D:  05/28/2019 E:  05/28/2019     This report was finalized on 5/29/2019 10:22 PM by DR. Bret Julien MD.      Ct Abdomen Pelvis With Contrast    Result Date: 9/12/2019  Limited examination with small nodular scar identified at the right lung base. Tiny noncalcified nodules seen scattered throughout the lung fields which are all stable. There is no progression of disease. Abdomen and pelvis is also stable and unremarkable. Stable infrarenal abdominal aortic aneurysm. No evidence of metastatic disease.  D:  09/12/2019 E:  09/12/2019             Assessment/Plan    1.  Poorly differentiated neuroendocrine tumor of the lung with brain metastases.  He will start second line treatment with Taxol.  Initially I am going to give him 2 weeks on 1 week off due to Thanksgiving.  Otherwise he will be 3 weeks on 1 week off.  Otherwise no major issues that we need to address today.  He wants to try this treatment.  We had a long discussion last week about the implications of second line therapy in the benefits are fairly minimal.        2.  Brain metastases.  Patient was treated with a radiation for that.      I spent a total of 25 minutes in direct patient care, greater than 20 minutes (greater than 50%) were spent in coordination of care, and counseling the patient regarding  Lung cancer . Answered any questions patient had with medication and plan.      Ninfa Vale MD  [facility] Hematology and Oncology    Return on: 12/06/19  Return in (Approximately): 3 weeks, Schedule with next infusion    No orders of the defined types were placed in this encounter.      11/15/2019          Please note that portions of this note may have been completed with a voice recognition program. Efforts were made to edit the dictations, but occasionally words are mistranscribed.         .more

## 2019-12-06 NOTE — PROGRESS NOTES
[facility]  PROBLEM LIST:  Oncology/Hematology History    1.  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  2.  Plan for palliative WBRT starting 4/23/2019  3.  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  Patient had Tecentriq added to his regimen with cycle 2.          Neuroendocrine carcinoma of lung (CMS/HCC)    4/14/2019 Initial Diagnosis     Neuroendocrine carcinoma of lung (CMS/HCC)         4/16/2019 -  Chemotherapy     OP LUNG Etoposide / CARBOplatin AUC=5           4/23/2019 - 5/6/2019 Radiation     Radiation OncologyTreatment Course:  Darron Martino received 3000 cGy in 10 fractions to brain via External Beam Radiation - EBRT.         5/8/2019 - 11/8/2019 Chemotherapy     OP LUNG Atezolizumab / CARBOplatin AUC=5 / Etoposide (SCLC)           11/15/2019 -  Chemotherapy     OP LUNG PACLitaxel 80 mg/m2 D1,8,15              REASON FOR VISIT: Management of my lung cancer    HISTORY OF PRESENT ILLNESS:   87 y.o.  male presents today for follow-up of his lung cancer.  He is currently on second line agent with Taxol.  He is completed 3 doses of it.  Unfortunately he is having a lot of issues with shortness of breath and fatigue.  He is noticeably worse clinically.  He denies any fevers.    Past medical history, social history and family history was reviewed and unchanged from prior visit.    Review of Systems:    Review of Systems   Constitutional: Positive for appetite change and fatigue.   HENT:  Negative.    Eyes: Negative.    Respiratory: Positive for hemoptysis and shortness of breath.    Cardiovascular: Negative.    Gastrointestinal: Negative.    Endocrine: Negative.    Genitourinary: Negative.     Neurological: Positive for extremity weakness.   Hematological: Negative.    Psychiatric/Behavioral: Negative.       A comprehensive 14 point review of  "systems was performed and was negative except as mentioned.      Medications:  The current medication list was reviewed in the EMR    ALLERGIES:    Allergies   Allergen Reactions   • Norvasc [Amlodipine Besylate] Shortness Of Breath     Severe shortness of breath as soon as he takes it   • Other Shortness Of Breath     Pt reports was started on a medication similar to norvasc but can't remember the name but had same reaction to it as he did the norvasc   • Hydrocodone GI Intolerance     Severe constipation; Originally listed as \"other\" with hydrocodone/apap in the comment field.          Physical Exam    VITAL SIGNS:  /58 Comment: LUE  Pulse 67   Temp 97.6 °F (36.4 °C) (Temporal)   Resp 24   Ht 177.8 cm (70\")   Wt 76.2 kg (168 lb)   SpO2 94% Comment: RA  BMI 24.11 kg/m²     Wt Readings from Last 3 Encounters:   12/06/19 76.2 kg (168 lb)   11/22/19 75.8 kg (167 lb)   11/15/19 78 kg (172 lb)       Body mass index is 24.11 kg/m². Body surface area is 1.94 meters squared.         Performance Status: 1    General: frail ill appearing, in no acute distress  HEENT: sclera anicteric, oropharynx clear, neck is supple  Lymphatics: no cervical, supraclavicular, or axillary adenopathy  Cardiovascular: regular rate and rhythm, no murmurs, rubs or gallops  Lungs: Decreased breath sounds on the bases  Abdomen: soft, nontender, nondistended.  No palpable organomegaly  Extremities: no lower extremity edema  Skin: no rashes, lesions, bruising, or petechiae  Msk:  Shows no weakness of the large muscle groups  Psych: Mood is stable        RECENT LABS:    Lab Results   Component Value Date    HGB 11.5 (L) 11/22/2019    HCT 34.6 (L) 11/22/2019    MCV 91.8 11/22/2019     11/22/2019    WBC 5.80 11/22/2019    NEUTROABS 3.80 11/22/2019    LYMPHSABS 1.60 11/22/2019    MONOSABS 0.50 11/22/2019    EOSABS 0.14 11/04/2019    BASOSABS 0.03 11/04/2019       Lab Results   Component Value Date    GLUCOSE 96 11/15/2019    BUN 15 " 11/15/2019    CREATININE 0.74 (L) 11/15/2019     (L) 11/15/2019    K 4.1 11/15/2019    CL 97 (L) 11/15/2019    CO2 26.0 11/15/2019    CALCIUM 9.1 11/15/2019    PROTEINTOT 6.4 11/15/2019    ALBUMIN 3.40 (L) 11/15/2019    BILITOT 0.7 11/15/2019    ALKPHOS 98 11/15/2019    AST 30 11/15/2019    ALT 7 11/15/2019       Ct Abdomen Pelvis Without Contrast    Result Date: 6/22/2019  Infrarenal abdominal aortic aneurysm. There is no acute intra-abdominal or pelvic abnormality identified. Improvement seen in the aeration of the right lung base with only minimal residual scarring and chronic changes present.  DICTATED:   06/21/2019 EDITED/ls :   06/21/2019   This report was finalized on 6/22/2019 12:12 PM by Dr. Екатерина Vela MD.      Ct Chest With Contrast    Result Date: 9/12/2019  Limited examination with small nodular scar identified at the right lung base. Tiny noncalcified nodules seen scattered throughout the lung fields which are all stable. There is no progression of disease. Abdomen and pelvis is also stable and unremarkable. Stable infrarenal abdominal aortic aneurysm. No evidence of metastatic disease.  D:  09/12/2019 E:  09/12/2019       Ct Chest With Contrast    Result Date: 5/28/2019  There has been resolution of a large right pleural effusion since the previous examination of 04/11/2019. There are scattered rare subcentimeter pulmonary nodules noted bilaterally. Due to the large effusion on the right is impossible to compare the small nodules on the right. However the small, subcentimeter nodules in the left lung are stable and unchanged.  D:  05/28/2019 E:  05/28/2019   This report was finalized on 5/28/2019 4:39 PM by Dr. Arpit Stout MD.      Mri Brain With & Without Contrast    Result Date: 9/12/2019  Chronic-appearing changes of the aging brain. No evidence of intracranial metastatic disease or other acute disease.   DICTATED:   09/11/2019 EDITED/ls :   09/11/2019  This report was finalized on  9/12/2019 3:41 PM by DR. Bret Julien MD.      Mri Brain With & Without Contrast    Result Date: 5/29/2019  No evidence of remaining intracranial metastatic disease or other new disease.  D:  05/28/2019 E:  05/28/2019     This report was finalized on 5/29/2019 10:22 PM by DR. Bret Julien MD.      Ct Abdomen Pelvis With Contrast    Result Date: 9/12/2019  Limited examination with small nodular scar identified at the right lung base. Tiny noncalcified nodules seen scattered throughout the lung fields which are all stable. There is no progression of disease. Abdomen and pelvis is also stable and unremarkable. Stable infrarenal abdominal aortic aneurysm. No evidence of metastatic disease.  D:  09/12/2019 E:  09/12/2019             Assessment/Plan    1.  Poorly differentiated neuroendocrine tumor of the lung with brain metastases.  I am going to hold all treatment for now and get CAT scans to see if he has progressive disease.  If he does have progressive disease our next  step is to involve the hospice care.  I spoke to him about this and he understands the implications.  We will see him back in my clinic next week after his CAT scans are done.    2.  Brain metastases.  Patient was treated with a radiation for that.      I spent a total of 25 minutes in direct patient care, greater than 20 minutes (greater than 50%) were spent in coordination of care, and counseling the patient regarding  Lung cancer . Answered any questions patient had with medication and plan.      Ninfa Vale MD  [facility] Hematology and Oncology    Return on: 12/10/19  Return in (Approximately): 1 week, Schedule with next infusion    Orders Placed This Encounter   Procedures   • CT Abdomen Pelvis With Contrast   • CT Chest With Contrast       12/6/2019

## 2019-12-08 PROBLEM — J18.9 PNEUMONIA: Status: ACTIVE | Noted: 2019-01-01

## 2019-12-08 NOTE — ED PROVIDER NOTES
Subjective   Darron Martino is a 87 y.o.male who presents to the emergency department with complaints of shortness of breath. The patient reports shortness of breath for one week with worsening shortness of breath today. His shortness of breath is accompanied by a productive cough, chest tightness, and right upper back pain, but he denies fever and vomiting. In an effort to relieve his discomfort, he took 1200 mg Mucinex which was effective yesterday but ineffective today. The patient's medical history consists of stage four lung cancer and he was diagnosed with pneumonia three weeks ago. He received treatment for his pneumonia and felt improved. His oncologist is Dr. Nik Vale, and his primary care physician is Dr. Greene. The patient is scheduled for an appointment with Dr. Vale in two days. He uses 2.5 L of oxygen every night. There are no other acute complaints at this time.      History provided by:  Patient  Shortness of Breath   Severity:  Moderate  Onset quality:  Sudden  Duration:  1 week  Timing:  Constant  Progression:  Worsening  Chronicity:  Chronic  Ineffective treatments:  Oxygen (Mucinex)  Associated symptoms: cough and sputum production    Associated symptoms: no fever and no vomiting  Sore throat:      Risk factors: hx of cancer        Review of Systems   Constitutional: Negative for fever.   HENT: Sore throat:      Respiratory: Positive for cough, sputum production, chest tightness and shortness of breath.    Gastrointestinal: Negative for vomiting.   Musculoskeletal: Positive for back pain.   All other systems reviewed and are negative.      Past Medical History:   Diagnosis Date   • Abnormal glucose    • Acquired hyperlipoproteinemia    • Acute bronchitis    • Allergic rhinitis    • Arteriosclerosis of coronary artery    • Cellulitis and abscess    • Cough    • Edema extremities    • Elevated PSA    • Encounter for prostate cancer screening    • Generalized osteoarthrosis,  "involving multiple sites    • Hypertension    • Lumbosacral spondylosis without myelopathy    • Pain in joint, pelvic region and thigh    • Preventative health care    • Right groin pain    • Transient cerebral ischemia    • Ulnar neuropathy    • Upper back pain on right side    • UTI (urinary tract infection)    • Varicose veins        Allergies   Allergen Reactions   • Norvasc [Amlodipine Besylate] Shortness Of Breath     Severe shortness of breath as soon as he takes it   • Other Shortness Of Breath     Pt reports was started on a medication similar to norvasc but can't remember the name but had same reaction to it as he did the norvasc   • Hydrocodone GI Intolerance     Severe constipation; Originally listed as \"other\" with hydrocodone/apap in the comment field.        Past Surgical History:   Procedure Laterality Date   • CARDIAC SURGERY     • INGUINAL HERNIA REPAIR     • LEG SURGERY         Family History   Problem Relation Age of Onset   • Hypertension Mother    • Arthritis Father        Social History     Socioeconomic History   • Marital status:      Spouse name: Not on file   • Number of children: Not on file   • Years of education: Not on file   • Highest education level: Not on file   Tobacco Use   • Smoking status: Former Smoker     Packs/day: 2.00     Years: 48.00     Pack years: 96.00     Types: Cigarettes     Last attempt to quit:      Years since quittin.9   • Smokeless tobacco: Never Used   Substance and Sexual Activity   • Alcohol use: Yes     Comment: occasional   • Drug use: No   • Sexual activity: Never         Objective   Physical Exam   Constitutional: He is oriented to person, place, and time. He appears well-developed and well-nourished. No distress.   HENT:   Head: Normocephalic and atraumatic.   Eyes: Conjunctivae are normal. No scleral icterus.   Neck: Normal range of motion. Neck supple.   Cardiovascular: Normal rate, regular rhythm and normal heart sounds. " "  Pulmonary/Chest: Tachypnea noted. No respiratory distress.   Coarse breath sounds throughout the right lung field, tachypnea    Abdominal: Soft. There is no tenderness.   Musculoskeletal: Normal range of motion.   Neurological: He is alert and oriented to person, place, and time.   Skin: Skin is warm and dry.   Psychiatric: He has a normal mood and affect. His behavior is normal.   Nursing note and vitals reviewed.      Procedures         ED Course     I reviewed his charts.  He has advanced cancer, and Dr Vale's most recent note mentions failure to respond to chemo and that pt will likely need to transition into Hospice.  Pt is aware of this, says to me \"I think my time is up\" but he was told plan was repeat scans and determining course of action based on those.  He had the scans two days ago but has not met with Nik to discuss them and is not willing to consider comfort care/Hospice until that happens.  Until then he wishes full care.    I reviewed the CT scans with Dr Julien.  They show extensive R lung pneumonia, mild L lung pneumonia, and apparently very mild progression of the cancer.  I discussed this with patient.    Labs are benign.  He feels better with meds.  HCAP abx ordered with fluids.  Patient stable on serial rechecks.  Discussed exam findings, test results so far and concerns in detail at the bedside.  Discussed need for admission for further evaluation and treatment.                  MDM  Number of Diagnoses or Management Options  HCAP (healthcare-associated pneumonia):      Amount and/or Complexity of Data Reviewed  Clinical lab tests: reviewed and ordered  Tests in the radiology section of CPT®: reviewed and ordered  Decide to obtain previous medical records or to obtain history from someone other than the patient: yes  Review and summarize past medical records: yes  Discuss the patient with other providers: yes  Independent visualization of images, tracings, or specimens: yes        Final " diagnoses:   HCAP (healthcare-associated pneumonia)       Documentation assistance provided by jacquie Mccabe.  Information recorded by the jacquie was done at my direction and has been verified and validated by me.     Phil Mccabe  12/08/19 [id number]       Gaurav Valdez MD  12/08/19 [id number]

## 2019-12-09 NOTE — PLAN OF CARE
Problem: Palliative Care (Adult)  Goal: Identify Related Risk Factors and Signs and Symptoms  Flowsheets (Taken 12/9/2019 1433)  Palliative Care: Related Risk Factors: advanced age;chronic illness;condition is progressive;end-stage disease;terminal illness;worsening symptoms  Palliative Care: Signs and Symptoms: anxiety;breathlessness;caregiver strain;fatigue;loss of appetite     P  Problem: Patient Care Overview  Goal: Interprofessional Rounds/Family Conf  Flowsheets (Taken 12/9/2019 1435)  Summary: New Palliative consult on 12/9/2019 at 0129 for GOC/ACP and Pain management. Patient has Metastatic Neuroendocrine Carcinoma with mets to lung and brain, has malignant pleural effusions. Patient seen by Palliative Nurse, Misti Good and Dr. Alexander Monroy. Patient was nauseated, states Zofran didn't help, SOB, congested, coughing, denies chest pain, had BM today and states appetite is poor. States doctor told him he has days and he has accepted this and he is alright. Wife states she doesn't know options about discharge. Hospice has been consulted and will see today. Patient is sleeping now after Phenergan. I gave Yamilet Palliative brochure, handout, business card and meal discount card. Palliative will continue to follow for support, symptoms and discharge needs for end of life care.  Participants: ; advanced practice nurse; nursing; physician; social work/services     Problem: Palliative Care (Adult)  Goal: Identify Related Risk Factors and Signs and Symptoms  Flowsheets (Taken 12/9/2019 1433)  Palliative Care: Related Risk Factors: advanced age;chronic illness;condition is progressive;end-stage disease;terminal illness;worsening symptoms  Palliative Care: Signs and Symptoms: anxiety;breathlessness;caregiver strain;fatigue;loss of appetite

## 2019-12-09 NOTE — PROGRESS NOTES
Discharge Planning Assessment  [facility]     Patient Name: Darron Martino  MRN: [id number]  Today's Date: 12/9/2019    Admit Date: 12/8/2019    Discharge Needs Assessment     Row Name 12/09/19 1410       Living Environment    Lives With  spouse    Name(s) of Who Lives With Patient  Wife Yamilet Diallo    Current Living Arrangements  home/apartment/condo    Primary Care Provided by  spouse/significant other;self    Provides Primary Care For  no one, unable/limited ability to care for self    Family Caregiver if Needed  spouse    Family Caregiver Names  Wife Yamilet    Quality of Family Relationships  supportive;involved;helpful    Able to Return to Prior Arrangements  other (see comments)       Resource/Environmental Concerns    Resource/Environmental Concerns  none    Transportation Concerns  car, none       Transition Planning    Patient/Family Anticipates Transition to  home with family    Patient/Family Anticipated Services at Transition  hospice care    Transportation Anticipated  family or friend will provide       Discharge Needs Assessment    Readmission Within the Last 30 Days  no previous admission in last 30 days    Concerns to be Addressed  decision making;discharge planning    Equipment Currently Used at Home  shower chair;wheelchair;walker, rolling;oxygen    Discharge Coordination/Progress  PCP is Dr. Greene; [facility]        Discharge Plan     Row Name 12/09/19 1412       Plan    Plan  Undetermined    Patient/Family in Agreement with Plan  other (see comments)    Plan Comments  Spoke with patient.  He lives in [place] with his wife.  Partially independent, see DME list.  No current home care services.  Patient states he understands his progronosis and has lived a good life and is the only living relative of his parents and 6 other siblings.  He is agreeable to discuss hospice services with his treatment team.  His wife will assist with transport, he denied further discharge needs.   Hospice/palliative following.      Final Discharge Disposition Code  30 - still a patient    Row Name 12/09/19 1353       Plan    Plan  Undetermined    Plan Comments  Referral received, chart reviewed. Met with Dr. Monroy who stated made visit to pt earlier today, pt declining-has stopped pt's IV fluids, code status changed to No CPR. Eliana stated discussed with pt's wife to         Destination      Service Provider Request Status Selected Services Address Phone Number Fax Number    [facility] Pending - Request Sent N/A [address] [place] [address] [phone]      Durable Medical Equipment      Coordination has not been started for this encounter.      Dialysis/Infusion      Coordination has not been started for this encounter.      Home Medical Care      Coordination has not been started for this encounter.      Therapy      Coordination has not been started for this encounter.      Community Resources      Coordination has not been started for this encounter.        Expected Discharge Date and Time     Expected Discharge Date Expected Discharge Time    Dec 12, 2019         Demographic Summary     Row Name 12/09/19 1356       General Information    Admission Type  inpatient    Arrived From  emergency department    Referral Source  admission list    Reason for Consult  discharge planning    Preferred Language  English     Used During This Interaction  no        Functional Status     Row Name 12/09/19 1355       Functional Status    Usual Activity Tolerance  moderate       Functional Status, IADL    Medications  independent    Meal Preparation  independent    Housekeeping  independent    Laundry  independent    Shopping  independent       Employment/    Employment Status  , previous service;retired            Branch  [employer]        Psychosocial    No documentation.       Abuse/Neglect    No documentation.       Legal    No documentation.       Substance  Abuse    No documentation.       Patient Forms    No documentation.           Rubina Cohn RN

## 2019-12-09 NOTE — CONSULTS
Duong Greene MD  Consulting physician: Demarcus    Chief Complaint   Patient presents with   • Shortness of Breath         HPI: Patient is an 87-year-old male with a history of metastatic neuroendocrine tumor.  Patient brought in hospital due to dyspnea and found to have pneumonia versus lymphangitic spread.  Patient is also had decline in respect to other symptoms primarily nausea as well as restlessness and agitation.  Wife states the patient has been nauseous over the last 24 hours with several episodes of vomiting.  Most recently the patient has been taking Phenergan just prior to my visit and is apparently been getting increasingly restless and somewhat confused.      Past Medical History:   Diagnosis Date   • Abnormal glucose    • Acquired hyperlipoproteinemia    • Acute bronchitis    • Allergic rhinitis    • Arteriosclerosis of coronary artery    • Cellulitis and abscess    • Cough    • Edema extremities    • Elevated PSA    • Encounter for prostate cancer screening    • Generalized osteoarthrosis, involving multiple sites    • Hypertension    • Lumbosacral spondylosis without myelopathy    • Pain in joint, pelvic region and thigh    • Preventative health care    • Right groin pain    • Transient cerebral ischemia    • Ulnar neuropathy    • Upper back pain on right side    • UTI (urinary tract infection)    • Varicose veins      Past Surgical History:   Procedure Laterality Date   • CARDIAC SURGERY     • INGUINAL HERNIA REPAIR     • LEG SURGERY       Current Code Status     Date Active Code Status Order ID Comments User Context       12/9/2019 1307 No CPR [id number]  Alexander Monroy, DO Inpatient       Questions for Current Code Status     Question Answer Comment    Code Status No CPR     Medical Interventions (Level of Support Prior to Arrest) Limited     Limited Support to NOT Include Intubation      Antiarrhythmic Drugs      Cardioversion/Defibrillation      Vasopressors      NIPPV  (Non-Invasive Positive Pressure Support)      Dialysis      Artificial Nutrition         Current Facility-Administered Medications   Medication Dose Route Frequency Provider Last Rate Last Dose   • aspirin EC tablet 81 mg  81 mg Oral Daily Bret Giron MD   81 mg at 12/09/19 0842   • guaiFENesin (MUCINEX) 12 hr tablet 600 mg  600 mg Oral Q12H Bret Giron MD   600 mg at 12/09/19 0842   • haloperidol lactate (HALDOL) injection 0.5 mg  0.5 mg Intravenous Q6H PRN Alexander Monroy, DO       • heparin (porcine) 5000 UNIT/ML injection 5,000 Units  5,000 Units Subcutaneous Q8H Bret Giron MD   5,000 Units at 12/09/19 0552   • HYDROmorphone (DILAUDID) injection 0.5 mg  0.5 mg Intravenous Q2H PRN Bret Giron MD   0.5 mg at 12/09/19 0314   • ipratropium-albuterol (DUO-NEB) nebulizer solution 3 mL  3 mL Nebulization 4x Daily - RT Bret Giron MD   3 mL at 12/08/19 2352   • ipratropium-albuterol (DUO-NEB) nebulizer solution 3 mL  3 mL Nebulization Q4H PRN Bret Giron MD       • LORazepam (ATIVAN) injection 0.5 mg  0.5 mg Intravenous Q4H PRN Alexander Monroy, DO       • metoprolol succinate XL (TOPROL-XL) 24 hr tablet 50 mg  50 mg Oral Daily Bret Giron MD   50 mg at 12/09/19 0842   • ondansetron (ZOFRAN) tablet 4 mg  4 mg Oral Q6H PRN Bret Giron MD        Or   • ondansetron (ZOFRAN) injection 4 mg  4 mg Intravenous Q6H PRN Bret Giron MD   4 mg at 12/09/19 1052   • polyethylene glycol (MIRALAX) packet 17 g  17 g Oral BID Bret Giron MD   17 g at 12/09/19 0843   • saccharomyces boulardii (FLORASTOR) capsule 250 mg  250 mg Oral BID Bret Giron MD   250 mg at 12/09/19 0842   • sodium chloride 0.9 % flush 10 mL  10 mL Intravenous PRN Bret Giron MD       • sodium chloride 0.9 % flush 10 mL  10 mL Intravenous Q12H Bret Giron MD   10 mL at 12/09/19 0843   • sodium chloride 0.9 % flush 10 mL  10 mL Intravenous PRN Bret Giron MD            haloperidol lactate  •   "HYDROmorphone  •  ipratropium-albuterol  •  LORazepam  •  ondansetron **OR** ondansetron  •  sodium chloride  •  sodium chloride  Allergies   Allergen Reactions   • Norvasc [Amlodipine Besylate] Shortness Of Breath     Severe shortness of breath as soon as he takes it   • Other Shortness Of Breath     Pt reports was started on a medication similar to norvasc but can't remember the name but had same reaction to it as he did the norvasc   • Hydrocodone GI Intolerance     Severe constipation; Originally listed as \"other\" with hydrocodone/apap in the comment field.      Family History   Problem Relation Age of Onset   • Hypertension Mother    • Arthritis Father      Social History     Socioeconomic History   • Marital status:      Spouse name: Not on file   • Number of children: Not on file   • Years of education: Not on file   • Highest education level: Not on file   Tobacco Use   • Smoking status: Former Smoker     Packs/day: 2.00     Years: 48.00     Pack years: 96.00     Types: Cigarettes     Last attempt to quit:      Years since quittin.9   • Smokeless tobacco: Never Used   Substance and Sexual Activity   • Alcohol use: Yes     Comment: occasional   • Drug use: No   • Sexual activity: Never     Review of Systems -all others reviewed and are negative as mentioned in the HPI      /60 (BP Location: Left arm, Patient Position: Lying)   Pulse 70   Temp 97.7 °F (36.5 °C) (Oral)   Resp 20   Ht 177.8 cm (70\")   Wt 78.7 kg (173 lb 9.6 oz)   SpO2 93%   BMI 24.91 kg/m²     Intake/Output Summary (Last 24 hours) at 2019 1335  Last data filed at 2019 0914  Gross per 24 hour   Intake 2589 ml   Output 1600 ml   Net 989 ml     Physical Exam:      General Appearance:    Awake ,restless appearing   Head:    Normocephalic, without obvious abnormality, atraumatic   Eyes:            Lids and lashes normal, conjunctivae and sclerae normal, no   icterus, no pallor, corneas clear, PERRLA   Ears:    " Ears appear intact with no abnormalities noted   Throat:   No oral lesions, no thrush, oral mucosa moist   Neck:   No adenopathy, supple, trachea midline, no thyromegaly, no   carotid bruit, no JVD   Back:     No kyphosis present, no scoliosis present, no skin lesions,      erythema or scars, no tenderness to percussion or                   palpation,   range of motion normal   Lungs:     Clear to auscultation,respirations regular, even and                  unlabored    Heart:    Regular rhythm and normal rate, normal S1 and S2, no            murmur, no gallop, no rub, no click   Chest Wall:    No abnormalities observed   Abdomen:     Normal bowel sounds, no masses, no organomegaly, soft        non-tender, non-distended, no guarding, no rebound                tenderness   Rectal:     Deferred   Extremities:   Moves all extremities well, no edema, no cyanosis, no             redness   Pulses:   Pulses palpable and equal bilaterally   Skin:   No bleeding, bruising or rash   Lymph nodes:   No palpable adenopathy   Neurologic:   Cranial nerves 2 - 12 grossly intact, sensation intact, DTR       present and equal bilaterally       Results from last 7 days   Lab Units 12/08/19  1700   WBC 10*3/mm3 7.21   HEMOGLOBIN g/dL 10.9*   HEMATOCRIT % 32.4*   PLATELETS 10*3/mm3 170     Results from last 7 days   Lab Units 12/09/19  0039 12/08/19  1700   SODIUM mmol/L 129* 130*   POTASSIUM mmol/L 3.9 3.7   CHLORIDE mmol/L 98 97*   CO2 mmol/L 22.0 23.0   BUN mg/dL 6* 6*   CREATININE mg/dL 0.64* 0.55*   CALCIUM mg/dL 8.3* 8.8   BILIRUBIN mg/dL  --  0.8   ALK PHOS U/L  --  96   ALT (SGPT) U/L  --  6   AST (SGOT) U/L  --  23   GLUCOSE mg/dL 101* 103*     Results from last 7 days   Lab Units 12/09/19  0039   SODIUM mmol/L 129*   POTASSIUM mmol/L 3.9   CHLORIDE mmol/L 98   CO2 mmol/L 22.0   BUN mg/dL 6*   CREATININE mg/dL 0.64*   GLUCOSE mg/dL 101*   CALCIUM mg/dL 8.3*     Imaging Results (Last 72 Hours)     Procedure Component Value Units  Date/Time    XR Chest 1 View [[id number]] Collected:  12/08/19 1728     Updated:  12/08/19 1802    Narrative:          EXAMINATION: XR CHEST 1 VW - 12/08/2019     INDICATION: Shortness of air.     COMPARISON: 11/04/2019     FINDINGS: There is persistent right basilar effusion and either  atelectasis or pneumonia, a little increased. There is also diffusely  increased interstitial disease, perihilar in distribution, which  suggests superimposed interstitial edema. Heart remains mildly enlarged.  No pneumothorax is seen. No left lung effusion or consolidation is seen.            Impression:       1. Persistent right basilar atelectasis or pneumonia, and some effusion,  a little increased from the prior study.  2. Interval development of perihilar interstitial disease, which may be  superimposed mild pulmonary interstitial edema.     DICTATED:   12/08/2019  EDITED/ls :   12/08/2019      This report was finalized on 12/8/2019 5:59 PM by DR. Bret Julien MD.           Impression: Neuroendocrine tumor  Restlessness  N/V  GOC    Plan: Talk to the patient's wife about overall goals of care.  Plan is to focus primarily on patient's comfort.  I will stop IV fluids at this point in time.  We will get hospice to evaluate the patient.    I will add some as needed Haldol to see if this will help with both the patient's nausea as well as his restlessness.  We will hold off on any further venogram.        Alexander Monroy,   12/09/19  1:35 PM

## 2019-12-09 NOTE — CONSULTS
"REFERRING PHYSICIAN: Julio C Gaming MD    PRIMARY CARE PROVIDER: Duong Greene MD    REASON FOR CONSULTATION: Metastatic neuroendocrine carcinoma      HISTORY OF PRESENT ILLNESS: 87-year-old gentleman with metastatic neuroendocrine carcinoma who had been treated on second line Taxol recently with decline in overall health.  I performed a CAT scan on Friday due to concern of progressive disease.  He presents to the hospital with worsening respiratory decline.      Allergies   Allergen Reactions   • Norvasc [Amlodipine Besylate] Shortness Of Breath     Severe shortness of breath as soon as he takes it   • Other Shortness Of Breath     Pt reports was started on a medication similar to norvasc but can't remember the name but had same reaction to it as he did the norvasc   • Hydrocodone GI Intolerance     Severe constipation; Originally listed as \"other\" with hydrocodone/apap in the comment field.        Past Medical History:   Diagnosis Date   • Abnormal glucose    • Acquired hyperlipoproteinemia    • Acute bronchitis    • Allergic rhinitis    • Arteriosclerosis of coronary artery    • Cellulitis and abscess    • Cough    • Edema extremities    • Elevated PSA    • Encounter for prostate cancer screening    • Generalized osteoarthrosis, involving multiple sites    • Hypertension    • Lumbosacral spondylosis without myelopathy    • Pain in joint, pelvic region and thigh    • Preventative health care    • Right groin pain    • Transient cerebral ischemia    • Ulnar neuropathy    • Upper back pain on right side    • UTI (urinary tract infection)    • Varicose veins          Current Facility-Administered Medications:   •  [START ON 12/10/2019] ! Vancomycin trough Tuesday at 1130. Hold noon dose until reviewed by pharmacy, , Does not apply, Once, Corky Kang , [facility]  •  aspirin EC tablet 81 mg, 81 mg, Oral, Daily, Bret Giron MD  •  guaiFENesin (MUCINEX) 12 hr tablet 600 mg, 600 mg, Oral, Q12H, " Bret Giron MD, 600 mg at 12/08/19 2206  •  heparin (porcine) 5000 UNIT/ML injection 5,000 Units, 5,000 Units, Subcutaneous, Q8H, Bret Giron MD, 5,000 Units at 12/09/19 0552  •  HYDROmorphone (DILAUDID) injection 0.5 mg, 0.5 mg, Intravenous, Q2H PRN, Bret Giron MD, 0.5 mg at 12/09/19 0314  •  ipratropium-albuterol (DUO-NEB) nebulizer solution 3 mL, 3 mL, Nebulization, 4x Daily - RT, Bret Giron MD, 3 mL at 12/08/19 2352  •  ipratropium-albuterol (DUO-NEB) nebulizer solution 3 mL, 3 mL, Nebulization, Q4H PRN, Bret Giron MD  •  lactated ringers infusion, 100 mL/hr, Intravenous, Continuous, Bret Giron MD  •  metoprolol succinate XL (TOPROL-XL) 24 hr tablet 50 mg, 50 mg, Oral, Daily, Bret Giron MD  •  ondansetron (ZOFRAN) tablet 4 mg, 4 mg, Oral, Q6H PRN **OR** ondansetron (ZOFRAN) injection 4 mg, 4 mg, Intravenous, Q6H PRN, Bret Giron MD, 4 mg at 12/08/19 2347  •  Pharmacy to dose vancomycin, , Does not apply, Continuous PRN, Bret Giron MD  •  piperacillin-tazobactam (ZOSYN) 3.375 g in iso-osmotic dextrose 50 ml (premix), 3.375 g, Intravenous, Q8H, Bret Giron MD, 3.375 g at 12/08/19 2344  •  polyethylene glycol (MIRALAX) packet 17 g, 17 g, Oral, BID, Bret Giron MD, 17 g at 12/08/19 2142  •  promethazine (PHENERGAN) injection 12.5 mg, 12.5 mg, Intravenous, Once, Patricia Tracy APRN  •  saccharomyces boulardii (FLORASTOR) capsule 250 mg, 250 mg, Oral, BID, Bret Giron MD, 250 mg at 12/08/19 2142  •  sodium chloride 0.9 % flush 10 mL, 10 mL, Intravenous, PRN, Bret Giron MD  •  sodium chloride 0.9 % flush 10 mL, 10 mL, Intravenous, Q12H, Bret Giron MD, 10 mL at 12/08/19 2143  •  sodium chloride 0.9 % flush 10 mL, 10 mL, Intravenous, PRN, Bret Giron MD  •  vancomycin 1250 mg/250 mL 0.9% NS IVPB ([facility]), 15 mg/kg, Intravenous, Q12H, Corky Kang IV, [facility]    Past Surgical History:   Procedure Laterality Date   • CARDIAC SURGERY     •  INGUINAL HERNIA REPAIR     • LEG SURGERY         Social History     Socioeconomic History   • Marital status:      Spouse name: Not on file   • Number of children: Not on file   • Years of education: Not on file   • Highest education level: Not on file   Tobacco Use   • Smoking status: Former Smoker     Packs/day: 2.00     Years: 48.00     Pack years: 96.00     Types: Cigarettes     Last attempt to quit:      Years since quittin.9   • Smokeless tobacco: Never Used   Substance and Sexual Activity   • Alcohol use: Yes     Comment: occasional   • Drug use: No   • Sexual activity: Never       Family History   Problem Relation Age of Onset   • Hypertension Mother    • Arthritis Father        Oncology/Hematology History    1.  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  2.  Plan for palliative WBRT starting 2019  3.  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  Patient had Tecentriq added to his regimen with cycle 2.          Neuroendocrine carcinoma of lung (CMS/HCC)    2019 Initial Diagnosis     Neuroendocrine carcinoma of lung (CMS/HCC)      2019 -  Chemotherapy     OP LUNG Etoposide / CARBOplatin AUC=5        2019 - 2019 Radiation     Radiation OncologyTreatment Course:  Darron Martino received 3000 cGy in 10 fractions to brain via External Beam Radiation - EBRT.      2019 - 2019 Chemotherapy     OP LUNG Atezolizumab / CARBOplatin AUC=5 / Etoposide (SCLC)        11/15/2019 -  Chemotherapy     OP LUNG PACLitaxel 80 mg/m2 D1,8,15             REVIEW OF SYSTEMS:  A 14 point review of systems was performed and is negative except as noted below.    Review of Systems   Constitutional: Positive for appetite change, fatigue and unexpected weight change.   HENT:  Negative.    Eyes: Negative.    Respiratory:  Positive for cough and shortness of breath.    Gastrointestinal: Negative.    Endocrine: Negative.    Genitourinary: Negative.     Neurological: Positive for extremity weakness.   Hematological: Negative.    Psychiatric/Behavioral: The patient is nervous/anxious.          Objective     Vitals:    12/09/19 0050 12/09/19 0544 12/09/19 0553 12/09/19 0707   BP:   108/62 143/79   BP Location:   Left arm Left arm   Patient Position:   Lying Lying   Pulse:    84   Resp: 20 20 22   Temp:   98.2 °F (36.8 °C) 97.9 °F (36.6 °C)   TempSrc:   Oral Oral   SpO2: 90%  92% 92%   Weight:  78.7 kg (173 lb 9.6 oz)     Height:           Temp:  [97.6 °F (36.4 °C)-98.2 °F (36.8 °C)] 97.9 °F (36.6 °C)     Performance Status: 4    Physical Exam    General: frail male in resp distress  HEENT: sclerae anicteric, oropharynx clear  Lymphatics: no cervical, supraclavicular, or axillary adenopathy  Cardiovascular: regular rate and rhythm, no murmurs  Lungs: bilteral rhonchi  Abdomen: soft, nontender, nondistended.  No palpable organomegaly  Extremities: no lower extremity edema  Skin: no rashes, lesions, bruising, or petechiae      LABS:    Lab Results   Component Value Date    HGB 10.9 (L) 12/08/2019    HCT 32.4 (L) 12/08/2019    MCV 89.8 12/08/2019     12/08/2019    WBC 7.21 12/08/2019    NEUTROABS 4.65 12/08/2019    LYMPHSABS 1.17 12/08/2019    MONOSABS 1.16 (H) 12/08/2019    EOSABS 0.09 12/08/2019    BASOSABS 0.07 12/08/2019     Lab Results   Component Value Date    GLUCOSE 101 (H) 12/09/2019    BUN 6 (L) 12/09/2019    CREATININE 0.64 (L) 12/09/2019     (L) 12/09/2019    K 3.9 12/09/2019    CL 98 12/09/2019    CO2 22.0 12/09/2019    CALCIUM 8.3 (L) 12/09/2019    PROTEINTOT 5.8 (L) 12/08/2019    ALBUMIN 3.10 (L) 12/08/2019    BILITOT 0.8 12/08/2019    ALKPHOS 96 12/08/2019    AST 23 12/08/2019    ALT 6 12/08/2019         IMAGING    Ct Chest With Contrast    Result Date: 12/8/2019  EXAMINATION: CT CHEST W CONTRAST, CT  ABDOMEN/PELVIS W CONTRAST - 12/06/2019  INDICATION: C7A.8-Other malignant neuroendocrine tumors.  TECHNIQUE: 5 mm post IV contrast images through the chest and 5 mm postoral and IV contrast portal venous phase and delayed venous phase images through the abdomen and pelvis.  The radiation dose reduction device was turned on for each scan per the ALARA (As Low as Reasonably Achievable) protocol.  COMPARISON: 11/04/2019  FINDINGS: There is mild generalized progression of mediastinal adenopathy and moderate progression of right pericardial adenopathy. There is no pericardial effusion and only a trace amount of right basilar pleural effusion. There is increasingly dense consolidation of the right lower lobe and new multifocal interstitial and airspace disease, presumably pneumonia in the right upper lobe, posterior left upper lobe and left lower lobe. Appearance would be  unusual for interstitial spread of malignancy. A few very small nonspecific pulmonary nodules are again seen. Bony structures appear to be intact.      1. Generalized progression of mediastinal adenopathy since 11/04/2019. 2. Worsening consolidation of the right lower lobe. 3. New multifocal interstitial disease bilaterally, which appears to represent superimposed pneumonia.  ABDOMEN AND PELVIS CT SCAN WITH ORAL AND IV CONTRAST: No hepatic lesions are identified. Spleen is not enlarged. No significant abnormalities are seen of the pancreas or kidneys. Small adrenal nodules have decreased in size and appear necrotic. Gallbladder appears normal. No upper abdominal adenopathy, ascites or acute inflammatory change is seen. Bowel loops are grossly normal in appearance.  Regarding the lower abdomen and pelvis, no mass, adenopathy, ascites or acute inflammatory focus is appreciated. Patient's infrarenal abdominal aortic aneurysm appears stable at 34 mm. Bony structures appear to be intact.  IMPRESSION: 1. Decreased size of the patient's bilateral adrenal  nodules compared to 11/04/2019 study. 2. No new intra-abdominal or intrapelvic metastatic disease is seen. 3. No new disease is identified elsewhere.  DICTATED:   12/08/2019 EDITED/ls :   12/08/2019      Ct Abdomen Pelvis With Contrast    Result Date: 12/8/2019  EXAMINATION: CT CHEST W CONTRAST, CT ABDOMEN/PELVIS W CONTRAST - 12/06/2019  INDICATION: C7A.8-Other malignant neuroendocrine tumors.  TECHNIQUE: 5 mm post IV contrast images through the chest and 5 mm postoral and IV contrast portal venous phase and delayed venous phase images through the abdomen and pelvis.  The radiation dose reduction device was turned on for each scan per the ALARA (As Low as Reasonably Achievable) protocol.  COMPARISON: 11/04/2019  FINDINGS: There is mild generalized progression of mediastinal adenopathy and moderate progression of right pericardial adenopathy. There is no pericardial effusion and only a trace amount of right basilar pleural effusion. There is increasingly dense consolidation of the right lower lobe and new multifocal interstitial and airspace disease, presumably pneumonia in the right upper lobe, posterior left upper lobe and left lower lobe. Appearance would be  unusual for interstitial spread of malignancy. A few very small nonspecific pulmonary nodules are again seen. Bony structures appear to be intact.      1. Generalized progression of mediastinal adenopathy since 11/04/2019. 2. Worsening consolidation of the right lower lobe. 3. New multifocal interstitial disease bilaterally, which appears to represent superimposed pneumonia.  ABDOMEN AND PELVIS CT SCAN WITH ORAL AND IV CONTRAST: No hepatic lesions are identified. Spleen is not enlarged. No significant abnormalities are seen of the pancreas or kidneys. Small adrenal nodules have decreased in size and appear necrotic. Gallbladder appears normal. No upper abdominal adenopathy, ascites or acute inflammatory change is seen. Bowel loops are grossly normal in  appearance.  Regarding the lower abdomen and pelvis, no mass, adenopathy, ascites or acute inflammatory focus is appreciated. Patient's infrarenal abdominal aortic aneurysm appears stable at 34 mm. Bony structures appear to be intact.  IMPRESSION: 1. Decreased size of the patient's bilateral adrenal nodules compared to 11/04/2019 study. 2. No new intra-abdominal or intrapelvic metastatic disease is seen. 3. No new disease is identified elsewhere.  DICTATED:   12/08/2019 EDITED/ls :   12/08/2019      Xr Chest 1 View    Result Date: 12/8/2019   EXAMINATION: XR CHEST 1 VW - 12/08/2019  INDICATION: Shortness of air.  COMPARISON: 11/04/2019  FINDINGS: There is persistent right basilar effusion and either atelectasis or pneumonia, a little increased. There is also diffusely increased interstitial disease, perihilar in distribution, which suggests superimposed interstitial edema. Heart remains mildly enlarged. No pneumothorax is seen. No left lung effusion or consolidation is seen.         1. Persistent right basilar atelectasis or pneumonia, and some effusion, a little increased from the prior study. 2. Interval development of perihilar interstitial disease, which may be superimposed mild pulmonary interstitial edema.  DICTATED:   12/08/2019 EDITED/ls :   12/08/2019  This report was finalized on 12/8/2019 5:59 PM by DR. Bret Julien MD.        ASSESSMENT/PLAN:    1.  Metastatic neuroendocrine carcinoma of the lung.  I reviewed the CAT scans which shows progressive disease in the mediastinum.  Does appear to have bilateral pneumonia though this is more likely to be lymphangitic spread considering he is not febrile.  Regardless he is not a candidate for any further treatment.  I spoke to him about hospice care.  I will consult hospice to assist in transitioning him to home.        Ninfa Vale MD    12/9/2019      Please note that portions of this note may have been completed with a voice recognition program. Efforts  were made to edit the dictations, but occasionally words are mistranscribed.

## 2019-12-09 NOTE — PROGRESS NOTES
"Clinical Nutrition   Reason For Visit: Identified at risk by screening criteria, MST score 2+, Unintentional weight loss, Reduced oral intake    Patient Name: Darron Martino  YOB: 1932  MRN: [id number]  Date of Encounter: 12/09/19 8:51 AM  Admission date: 12/8/2019      RD notes hospice has been consulted per oncology MD recommendation, likely home with hospice. As a result, patient is not appropriate for RD to assess at this time. RD ordered Boost Plus 2x daily since patient has consumed these in the past. RD will honor food preferences and be available as needed.      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  Malignant pleural effusion  Hematochezia ?2/2 hemorrhoid  Volume overload      Applicable PMH:  HTN  CAD  OA  S/p CCY  S/p inguinal hernia repair      PMH: He  has a past medical history of Abnormal glucose, Acquired hyperlipoproteinemia, Acute bronchitis, Allergic rhinitis, Arteriosclerosis of coronary artery, Cellulitis and abscess, Cough, Edema extremities, Elevated PSA, Encounter for prostate cancer screening, Generalized osteoarthrosis, involving multiple sites, Hypertension, Lumbosacral spondylosis without myelopathy, Pain in joint, pelvic region and thigh, Preventative health care, Right groin pain, Transient cerebral ischemia, Ulnar neuropathy, Upper back pain on right side, UTI (urinary tract infection), and Varicose veins.   PSxH: He  has a past surgical history that includes Cardiac surgery; Inguinal hernia repair; and Leg Surgery.        Reported/Observed/Food/Nutrition Related History   Per oncology MD note (12/9):  \"I reviewed the CAT scans which shows progressive disease in the mediastinum.  Does appear to have bilateral pneumonia though this is more likely to be lymphangitic spread considering he is not febrile.  Regardless he is not a candidate for any further " "treatment.  I spoke to him about hospice care.  I will consult hospice to assist in transitioning him to home.\"      Anthropometrics   Height: 70 in  Weight: 173 lbs (bed scale weight 12/9 per nsg doc)  BMI: 24.9  BMI classification: Normal: 18.5-24.9kg/m2   IBW: 166 lbs    UBW: 193 lbs (per previous oncology RD note in EMR (5/8/19))  Weight change: Per previous oncology RD note in EMR 5/8/19, patient unintentionally lost 20 lbs from 193 lbs to 173 lbs within 1.5 months. Current weight illustrates that patient has maintained weight of 173 lbs within the past 7 months.      Labs reviewed   Labs reviewed: Yes    Medications reviewed   Medications reviewed: Yes  Pertinent: zosyn, miralax, phenergan, probiotic, vancomycin  GTT: IVF @ 100 ml/hr  PRN: [email or web address]    Current Nutrition Prescription   PO: Diet Regular      Nutrition Diagnosis     Problem No nutrition diagnosis at this time   Etiology GOC/POC   Signs/Symptoms Hospice consult placed, likely home with hospice     Intervention   Intervention: Follow treatment progress, Care plan reviewed, Supplement provided     RD ordered Boost Plus 2x daily (per previous oncology RD note in EMR, patient occasionally drinks these at home).    Goal:   General: Nutrition support treatment, Hospice care    Monitoring/Evaluation:     Monitoring/Evaluation: Per protocol, POC/GOC  Will Continue to follow per protocol  Rhonda Davis RD  Time Spent: 20 min  "

## 2019-12-09 NOTE — PROGRESS NOTES
S: Nursing staff called to inform the patient was experiencing shortness of breath with lung sounds demonstrating crackles. Breathing treatment failed to improve shortness of air. Patient is requiring 4 liter per NC to maintain sat >90%. Patient is also experiencing nausea.     O:   General: A&O x3, moderate distress due to shortness of breath  Cardio: RRR  Lungs: diminished lung sounds in the bases with coarse crackles throughout all lung fields  ABD: distended but non-tender.     A: Fluid vol overload    P: Patient received Lasix 40 mg IV x1, ABG and BNP. Holding IV fluid for now. Daily weight-strict I&O.

## 2019-12-09 NOTE — H&P
[facility] Medicine Services  HISTORY AND PHYSICAL    Patient Name: Darron Martino  : 7/3/1932  MRN: [id number]  Primary Care Physician: Duong Greene MD  Date of admission: 2019      Subjective   Subjective     Chief Complaint:  Dyspnea    HPI:  Darron Martino is a 87 y.o. male with progressive SOA for the past week, but markedly worse in the past 2 days prompting his visit to the ED. He has R chest pain, worse with deep breathing. Cough and congestion with yellow sputum. Diaphoresis noted. Nausea and anorexia noted. Intermittent worsening RLQ pain from a prior hernia repair/mess insertion. Otherwise progressively weaker. Denies f/c, just sweats. Anorexia noted. Of noted did have one occurrence of BRBPR that he is sure was from a hemorrhoid.    Review of Systems   Constitutional: Positive for activity change, appetite change, diaphoresis and fatigue. Negative for chills and fever.   HENT: Positive for congestion. Negative for sinus pressure, sinus pain and trouble swallowing.    Respiratory: Positive for cough, chest tightness and shortness of breath. Negative for choking, wheezing and stridor.    Cardiovascular: Positive for chest pain. Negative for palpitations and leg swelling.   Gastrointestinal: Positive for abdominal distention, abdominal pain, blood in stool and nausea. Negative for vomiting.   Endocrine: Positive for cold intolerance.   Genitourinary: Negative.    Musculoskeletal: Positive for arthralgias, back pain and myalgias.   Skin: Negative.    Neurological: Positive for light-headedness.   Psychiatric/Behavioral: Positive for dysphoric mood.        All other systems reviewed and are negative.     Personal History     Past Medical History:   Diagnosis Date   • Abnormal glucose    • Acquired hyperlipoproteinemia    • Acute bronchitis    • Allergic rhinitis    • Arteriosclerosis of coronary artery    • Cellulitis and abscess    • Cough    • Edema  "extremities    • Elevated PSA    • Encounter for prostate cancer screening    • Generalized osteoarthrosis, involving multiple sites    • Hypertension    • Lumbosacral spondylosis without myelopathy    • Pain in joint, pelvic region and thigh    • Preventative health care    • Right groin pain    • Transient cerebral ischemia    • Ulnar neuropathy    • Upper back pain on right side    • UTI (urinary tract infection)    • Varicose veins        Past Surgical History:   Procedure Laterality Date   • CARDIAC SURGERY     • INGUINAL HERNIA REPAIR     • LEG SURGERY         Family History: family history includes Arthritis in his father; Hypertension in his mother. Otherwise pertinent FHx was reviewed and unremarkable.     Social History:  reports that he quit smoking about 27 years ago. His smoking use included cigarettes. He has a 96.00 pack-year smoking history. He has never used smokeless tobacco. He reports that he drinks alcohol. He reports that he does not use drugs.  Social History     Social History Narrative   • Not on file       Medications:  Available home medication information reviewed.    (Not in a hospital admission)    Allergies   Allergen Reactions   • Norvasc [Amlodipine Besylate] Shortness Of Breath     Severe shortness of breath as soon as he takes it   • Other Shortness Of Breath     Pt reports was started on a medication similar to norvasc but can't remember the name but had same reaction to it as he did the norvasc   • Hydrocodone GI Intolerance     Severe constipation; Originally listed as \"other\" with hydrocodone/apap in the comment field.        Objective   Objective     Vital Signs:   Temp:  [97.6 °F (36.4 °C)] 97.6 °F (36.4 °C)  Heart Rate:  [70-83] 77  Resp:  [20-32] 20  BP: (115-133)/(55-66) 115/55        Physical Exam   Mild respiratory distress, alert and oriented x 3, family at bedside  OP clear, dry MM  PERRL, face symmetric and speech clear  Neck supple, no LAD  RRR  Markedly diminished " both bases, with bibasilar rales  +BS, ND, mild TTP RLQ, without guarding or rebound  No c/c/e  No obvious rashes  Dysphoric affect  WORKMAN, but weak in general    Results Reviewed:  I have personally reviewed current lab and radiology data.    Results from last 7 days   Lab Units 12/08/19  1700   WBC 10*3/mm3 7.21   HEMOGLOBIN g/dL 10.9*   HEMATOCRIT % 32.4*   PLATELETS 10*3/mm3 170     Results from last 7 days   Lab Units 12/08/19  1700   SODIUM mmol/L 130*   POTASSIUM mmol/L 3.7   CHLORIDE mmol/L 97*   CO2 mmol/L 23.0   BUN mg/dL 6*   CREATININE mg/dL 0.55*   GLUCOSE mg/dL 103*   CALCIUM mg/dL 8.8   ALT (SGPT) U/L 6   AST (SGOT) U/L 23   TROPONIN T ng/mL <0.010   PROBNP pg/mL 530.2   LACTATE mmol/L 0.9   PROCALCITONIN ng/mL 0.10     Estimated Creatinine Clearance: 69.7 mL/min (A) (by C-G formula based on SCr of 0.55 mg/dL (L)).  Brief Urine Lab Results  (Last result in the past 365 days)      Color   Clarity   Blood   Leuk Est   Nitrite   Protein   CREAT   Urine HCG        08/26/19 1108             50           Imaging Results (Last 24 Hours)     Procedure Component Value Units Date/Time    XR Chest 1 View [[id number]] Collected:  12/08/19 1728     Updated:  12/08/19 1802    Narrative:          EXAMINATION: XR CHEST 1 VW - 12/08/2019     INDICATION: Shortness of air.     COMPARISON: 11/04/2019     FINDINGS: There is persistent right basilar effusion and either  atelectasis or pneumonia, a little increased. There is also diffusely  increased interstitial disease, perihilar in distribution, which  suggests superimposed interstitial edema. Heart remains mildly enlarged.  No pneumothorax is seen. No left lung effusion or consolidation is seen.            Impression:       1. Persistent right basilar atelectasis or pneumonia, and some effusion,  a little increased from the prior study.  2. Interval development of perihilar interstitial disease, which may be  superimposed mild pulmonary interstitial edema.     DICTATED:    12/08/2019  EDITED/ls :   12/08/2019      This report was finalized on 12/8/2019 5:59 PM by DR. Bret Julien MD.           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] Yes   • Hyponatremia [E87.1] Yes   • Malignant pleural effusion [J91.0] Yes   • CAD s/p MI and CABG in 1993 [I25.10] Yes   • Generalized osteoarthrosis, involving multiple sites [M15.9] Yes   • Hypertension [I10] Yes   • Lumbosacral spondylosis without myelopathy [M47.817] Yes     Neuroendocrine carcinoma with brain mets  --s/p chemotherapy, but on hold pending further input from oncology  --brain mets s/p XRT    Superimposed pneumonia on imaging  --Zosyn/Vanc  --nebs/pulmonary toilet    Episodic hematochezia  --monitor, possibly secondary to hemorrhoid    Hyponatremia  --monitor    HTN  --home meds as tolerated    DVT prophylaxis:  WILFRIDO    CODE STATUS:    Code Status and Medical Interventions:   Ordered at: 12/08/19 1918     Level Of Support Discussed With:    Patient     Code Status:    CPR     Medical Interventions (Level of Support Prior to Arrest):    Full       Admission Status:  I believe this patient meets inpatient criteria given his progression and severity of findings.  Electronically signed by Bret Giron MD, 12/08/19, 7:48 PM.

## 2019-12-09 NOTE — CONSULTS
Pharmacokinetic Consult - Vancomycin Initiation    Pharmacy consulted to manage vancomycin therapy for this 86 yo male with PNA .     Consulting Provider: Hospitalist Group  Indication: PNA  Target trough: 15-20 mcg/mL    Objective    Wt Readings from Last 3 Encounters:   12/08/19 78.6 kg (173 lb 3.2 oz)   12/06/19 76.2 kg (168 lb)   11/22/19 75.8 kg (167 lb)     Temp Readings from Last 3 Encounters:   12/08/19 97.7 °F (36.5 °C) (Oral)   12/06/19 97.6 °F (36.4 °C) (Temporal)   11/22/19 98.1 °F (36.7 °C) (Temporal)     BP Readings from Last 3 Encounters:   12/08/19 107/57   12/06/19 121/58   11/22/19 118/58     Pulse Readings from Last 3 Encounters:   12/08/19 70   12/06/19 67   11/22/19 70       Results from last 7 days   Lab Units 12/08/19  1700   SODIUM mmol/L 130*   POTASSIUM mmol/L 3.7   CHLORIDE mmol/L 97*   CO2 mmol/L 23.0   BUN mg/dL 6*   CREATININE mg/dL 0.55*   GLUCOSE mg/dL 103*   CALCIUM mg/dL 8.8       Estimated Creatinine Clearance: 72.3 mL/min (A) (by C-G formula based on SCr of 0.55 mg/dL (L)).    I/O last 3 completed shifts:  In: 1100 [IV Piggyback:1100]  Out: -     Results from last 7 days   Lab Units 12/08/19  1700   WBC 10*3/mm3 7.21   HEMOGLOBIN g/dL 10.9*   HEMATOCRIT % 32.4*   PLATELETS 10*3/mm3 170     Procalcitonin   Status:  Final result   Component  Ref Range & Units 12/08/19 1700   Procalcitonin  0.10 - 0.25 ng/mL 0.10    Resulting Agency [facility]   Narrative     As a Marker for Sepsis (Non-Neonates):   1. <0.5 ng/mL represents a low risk of severe sepsis and/or septic shock.  1. >2 ng/mL represents a high risk of severe sepsis and/or septic shock.    As a Marker for Lower Respiratory Tract Infections that require antibiotic therapy:  PCT on Admission     Antibiotic Therapy             6-12 Hrs later  > 0.5                Strongly Recommended            >0.25 - <0.5         Recommended  0.1 - 0.25           Discouraged                   Remeasure/reassess PCT  <0.1                  Strongly Discouraged          Remeasure/reassess PCT            Lactate   Date Value Ref Range Status   12/08/2019 0.9 0.5 - 2.0 mmol/L Final     Comment:     Falsely depressed results may occur on samples drawn from patients receiving N-Acetylcysteine (NAC) or Metamizole.       No results found for: ACANTHNAEG, AFBCX, BPERTUSSISCX, BLOODCX  No results found for: BCIDPCR, CXREFLEX, CSFCX, CULTURETIS  No results found for: CULTURES, HSVCX, URCX  No results found for: EYECULTURE, GCCX, LABHSV  No results found for: LEGIONELLA, MRSACX, MUMPSCX, MYCOPLASCX  No results found for: NOCARDIACX, STOOLCX  No results found for: THROATCX, UNSTIMCULT, URINECX, CULTURE, VZVCULTUR  No results found for: VIRALCULTU, WOUNDCX    No results found for: RESPCX    ANTI-INFECTIVE I-VENT REVIEW    Patient: Darron Martino  MRN#: [id number]  Attending: No name on file.  Admission Date: 120819    Current Antimicrobial Medications  Anti-Infectives (From admission, onward)      Ordered     Dose/Rate Route Frequency Start Stop    12/08/19 2054  ! Vancomycin trough Tuesday at 1130. Hold noon dose until reviewed by pharmacy     Ordering Provider:  Corky Kang IV, RPH     Does not apply Once 12/10/19 1100      12/08/19 2054  vancomycin 1250 mg/250 mL 0.9% NS IVPB ([facility])     Ordering Provider:  Corky Kang IV, RPH    15 mg/kg × 75.8 kg  over 90 Minutes Intravenous Every 12 Hours 12/09/19 1200 12/16/19 1159    12/08/19 2050  piperacillin-tazobactam (ZOSYN) 3.375 g in iso-osmotic dextrose 50 ml (premix)     Ordering Provider:  Bret Giron MD    3.375 g  over 4 Hours Intravenous Every 8 Hours 12/09/19 0000 12/15/19 2359    12/08/19 2054  vancomycin 1750 mg/500 mL 0.9% NS IVPB ([facility])     Ordering Provider:  Corky Kang IV, RPH    23 mg/kg × 75.8 kg  over 2 Hours Intravenous Once 12/08/19 2145 12/08/19 2050  Pharmacy to dose vancomycin     Ordering Provider:  Bret Giron MD     Does not apply Continuous PRN 12/08/19  2050 12/15/19 2049    12/08/19 1645  levoFLOXacin (LEVAQUIN) 750 mg/150 mL D5W (premix) (LEVAQUIN) 750 mg     Ordering Provider:  Gaurav Valdez MD    750 mg  over 90 Minutes Intravenous Once 12/08/19 1647 12/08/19 2027 12/08/19 1645  piperacillin-tazobactam (ZOSYN) 4.5 g in iso-osmotic dextrose 100 mL IVPB (premix)     Ordering Provider:  Gaurav Valdez MD    4.5 g Intravenous Once 12/08/19 1647 12/08/19 1800        Assessment/Plan    Will load patient with 1750 mg (23 mg/kg) now then begin maintenance therapy tomorrow at noon with 1250 mg (15 mg/kg) IV q12h. Will check serum vancomycin level Tuesday prior to 4th dose. Pharmacy will continue to follow closely and adjust therapy as needed for target trough 15-20 mcg/mL.    MRSA PCR ordered per protocol    Thank you for this consult.  Corky Kang IV, PharmD, BCPS  12/8/2019  8:59 PM

## 2019-12-09 NOTE — PLAN OF CARE
Problem: Patient Care Overview  Goal: Plan of Care Review  Outcome: Ongoing (interventions implemented as appropriate)  Flowsheets  Taken 12/9/2019 1637  Progress: declining  Outcome Summary: c/o nausea, gave one time dose phenergan which possibly caused pt to be disoriented and restless; vss, remains on 6l nc; will continue to monitor  Taken 12/9/2019 0830  Plan of Care Reviewed With: patient

## 2019-12-09 NOTE — PROGRESS NOTES
Continued Stay Note  [facility]     Patient Name: Darron Martino  MRN: [id number]  Today's Date: 12/9/2019    Admit Date: 12/8/2019    Discharge Plan     Row Name 12/09/19 1352       Plan    Plan  Undetermined     Referral received, chart reviewed. Visit made to pt. Pt sitting up in bed, alert, restless, pulling at the covers, pulled off the 02 sat monitor. Dr. Monroy has stopped pt's IV fluids, code status has been changed to No CPR. Pt refusing food.  Wife present, stated Dr. Monroy discussed with wife pt's decline in condition, waiting until tomorrow to reevaluate pt's condition and determine if pt eligible for inpatient hospice. Wife stated if eligible for hospice inpatient would want pt to remain at [facility] as opposed to transferring to the [facility] in [place]. Wife requesting medication for pt's restlessness, informed staff nurse Maria D of wife's request. Will continue to follow, please call [phone] if can be of further assistance.         Discharge Codes    No documentation.       Expected Discharge Date and Time     Expected Discharge Date Expected Discharge Time    Dec 12, 2019             Toshia Champion RN

## 2019-12-09 NOTE — DISCHARGE PLACEMENT REQUEST
"Darron Boyd (87 y.o. Male)   Referred 12/9/19 by Dr. KIMBERLEY Vale  PCP-Dr. Doung Greene  Dx-Metastatice Lung cancer      Date of Birth Social Security Number Address Home Phone MRN    07/03/1932  [address] [facility] [address] [phone] [id number]    Gnosticist Marital Status          None        Admission Date Admission Type Admitting Provider Attending Provider Department, Room/Bed    12/8/19 Emergency Julio C Gaming MD Schnell, Aaron, MD [address], S551/1    Discharge Date Discharge Disposition Discharge Destination                       Attending Provider:  Julio C Gaming MD    Allergies:  Norvasc [Amlodipine Besylate], Other, Hydrocodone    Isolation:  None   Infection:  None   Code Status:  CPR    Ht:  177.8 cm (70\")   Wt:  78.7 kg (173 lb 9.6 oz)    Admission Cmt:  None   Principal Problem:  Pneumonia [J18.9]                 Active Insurance as of 12/8/2019     Primary Coverage     Payor Plan Insurance Group Employer/Plan Group    MEDICARE MEDICARE A & B      Payor Plan Address Payor Plan Phone Number Payor Plan Fax Number Effective Dates    [address] [phone]  7/1/1997 - None Entered    [facility] [id number]       Subscriber Name Subscriber Birth Date Member ID       DARRON BOYD 7/3/1932 [id number]           Secondary Coverage     Payor Plan Insurance Group Employer/Plan Group    AARP MC SUP AARP HEALTH CARE OPTIONS      Payor Plan Address Payor Plan Phone Number Payor Plan Fax Number Effective Dates    [facility] [phone]  1/1/2016 - None Entered    [address]       [facility] [address]       Subscriber Name Subscriber Birth Date Member ID       DARRON BOYD 7/3/1932 [id number]           Tertiary Coverage     Payor Plan Insurance Group Employer/Plan Group     FOR LIFE  FOR LIFE MC SUP       Payor Plan Address Payor Plan Phone Number Payor Plan Fax Number Effective Dates    [address] [phone]  " 9/29/2016 - None Entered    [facility] [id number]       Subscriber Name Subscriber Birth Date Member ID       DARRON MARTINO 7/3/1932 [id number]                 Emergency Contacts      (Rel.) Home Phone Work Phone Mobile Phone    Yamilet Martino (Spouse) [phone] -- --            Emergency Contact Information     Name Relation Home Work Mobile    Yamilet Martino Spouse [phone]            Insurance Information                MEDICARE/MEDICARE A & B Phone: [phone]    Subscriber: Salena Darron E Subscriber#: [id number]    Group#:  Precert#:         [facility] HEALTH CARE OPTIONS Phone: [phone]    Subscriber: [place] Darron E Subscriber#: [id number]    Group#:  Precert#:          FOR LIFE/ FOR LIFE  SUP  Phone: [phone]    Subscriber: Salena Darron E Subscriber#: [id number]    Group#:  Precert#:           Problem List           Codes Noted - Resolved       Hospital    * (Principal) Pneumonia ICD-10-CM: J18.9  ICD-9-CM: 486 12/8/2019 - Present    Neuroendocrine carcinoma of lung (CMS/HCC) ICD-10-CM: C7A.8  ICD-9-CM: 209.21 4/14/2019 - Present    Hyponatremia ICD-10-CM: E87.1  ICD-9-CM: 276.1 4/13/2019 - Present    Malignant pleural effusion ICD-10-CM: J91.0  ICD-9-CM: 511.81 4/11/2019 - Present    Hypertension ICD-10-CM: I10  ICD-9-CM: 401.9 9/29/2016 - Present    Generalized osteoarthrosis, involving multiple sites ICD-10-CM: M15.9  ICD-9-CM: 715.09 9/29/2016 - Present    Lumbosacral spondylosis without myelopathy ICD-10-CM: M47.817  ICD-9-CM: 721.3 9/29/2016 - Present    CAD s/p MI and CABG in 1993 ICD-10-CM: I25.10  ICD-9-CM: 414.00 9/29/2016 - Present       Non-Hospital    Melanoma resected left arm 2018 ICD-10-CM: Z85.820  ICD-9-CM: V10.82 4/12/2019 - Present    Right lung atelectasis due to large pleural effusion ICD-10-CM: J98.11  ICD-9-CM: 518.0 4/12/2019 - Present    Acquired hyperlipoproteinemia ICD-10-CM: E78.5  ICD-9-CM: 272.4 9/29/2016  - Present    Allergic rhinitis ICD-10-CM: J30.9  ICD-9-CM: 477.9 2016 - Present    Varicose veins ICD-10-CM: I83.90  ICD-9-CM: 454.9 2016 - Present             History & Physical      Bret Giron MD at 19 194              [facility] Medicine Services  HISTORY AND PHYSICAL    Patient Name: Darron Martino  : 7/3/1932  MRN: [id number]  Primary Care Physician: Duong Greene MD  Date of admission: 2019      Subjective   Subjective     Chief Complaint:  Dyspnea    HPI:  Darron Martino is a 87 y.o. male with progressive SOA for the past week, but markedly worse in the past 2 days prompting his visit to the ED. He has R chest pain, worse with deep breathing. Cough and congestion with yellow sputum. Diaphoresis noted. Nausea and anorexia noted. Intermittent worsening RLQ pain from a prior hernia repair/mess insertion. Otherwise progressively weaker. Denies f/c, just sweats. Anorexia noted. Of noted did have one occurrence of BRBPR that he is sure was from a hemorrhoid.    Review of Systems   Constitutional: Positive for activity change, appetite change, diaphoresis and fatigue. Negative for chills and fever.   HENT: Positive for congestion. Negative for sinus pressure, sinus pain and trouble swallowing.    Respiratory: Positive for cough, chest tightness and shortness of breath. Negative for choking, wheezing and stridor.    Cardiovascular: Positive for chest pain. Negative for palpitations and leg swelling.   Gastrointestinal: Positive for abdominal distention, abdominal pain, blood in stool and nausea. Negative for vomiting.   Endocrine: Positive for cold intolerance.   Genitourinary: Negative.    Musculoskeletal: Positive for arthralgias, back pain and myalgias.   Skin: Negative.    Neurological: Positive for light-headedness.   Psychiatric/Behavioral: Positive for dysphoric mood.        All other systems reviewed and are negative.     Personal  "History     Past Medical History:   Diagnosis Date   • Abnormal glucose    • Acquired hyperlipoproteinemia    • Acute bronchitis    • Allergic rhinitis    • Arteriosclerosis of coronary artery    • Cellulitis and abscess    • Cough    • Edema extremities    • Elevated PSA    • Encounter for prostate cancer screening    • Generalized osteoarthrosis, involving multiple sites    • Hypertension    • Lumbosacral spondylosis without myelopathy    • Pain in joint, pelvic region and thigh    • Preventative health care    • Right groin pain    • Transient cerebral ischemia    • Ulnar neuropathy    • Upper back pain on right side    • UTI (urinary tract infection)    • Varicose veins        Past Surgical History:   Procedure Laterality Date   • CARDIAC SURGERY     • INGUINAL HERNIA REPAIR     • LEG SURGERY         Family History: family history includes Arthritis in his father; Hypertension in his mother. Otherwise pertinent FHx was reviewed and unremarkable.     Social History:  reports that he quit smoking about 27 years ago. His smoking use included cigarettes. He has a 96.00 pack-year smoking history. He has never used smokeless tobacco. He reports that he drinks alcohol. He reports that he does not use drugs.  Social History     Social History Narrative   • Not on file       Medications:  Available home medication information reviewed.    (Not in a hospital admission)    Allergies   Allergen Reactions   • Norvasc [Amlodipine Besylate] Shortness Of Breath     Severe shortness of breath as soon as he takes it   • Other Shortness Of Breath     Pt reports was started on a medication similar to norvasc but can't remember the name but had same reaction to it as he did the norvasc   • Hydrocodone GI Intolerance     Severe constipation; Originally listed as \"other\" with hydrocodone/apap in the comment field.        Objective   Objective     Vital Signs:   Temp:  [97.6 °F (36.4 °C)] 97.6 °F (36.4 °C)  Heart Rate:  [70-83] " 77  Resp:  [20-32] 20  BP: (115-133)/(55-66) 115/55        Physical Exam   Mild respiratory distress, alert and oriented x 3, family at bedside  OP clear, dry MM  PERRL, face symmetric and speech clear  Neck supple, no LAD  RRR  Markedly diminished both bases, with bibasilar rales  +BS, ND, mild TTP RLQ, without guarding or rebound  No c/c/e  No obvious rashes  Dysphoric affect  WORKMAN, but weak in general    Results Reviewed:  I have personally reviewed current lab and radiology data.    Results from last 7 days   Lab Units 12/08/19  1700   WBC 10*3/mm3 7.21   HEMOGLOBIN g/dL 10.9*   HEMATOCRIT % 32.4*   PLATELETS 10*3/mm3 170     Results from last 7 days   Lab Units 12/08/19  1700   SODIUM mmol/L 130*   POTASSIUM mmol/L 3.7   CHLORIDE mmol/L 97*   CO2 mmol/L 23.0   BUN mg/dL 6*   CREATININE mg/dL 0.55*   GLUCOSE mg/dL 103*   CALCIUM mg/dL 8.8   ALT (SGPT) U/L 6   AST (SGOT) U/L 23   TROPONIN T ng/mL <0.010   PROBNP pg/mL 530.2   LACTATE mmol/L 0.9   PROCALCITONIN ng/mL 0.10     Estimated Creatinine Clearance: 69.7 mL/min (A) (by C-G formula based on SCr of 0.55 mg/dL (L)).  Brief Urine Lab Results  (Last result in the past 365 days)      Color   Clarity   Blood   Leuk Est   Nitrite   Protein   CREAT   Urine HCG        08/26/19 1108             50           Imaging Results (Last 24 Hours)     Procedure Component Value Units Date/Time    XR Chest 1 View [[id number]] Collected:  12/08/19 1728     Updated:  12/08/19 1802    Narrative:          EXAMINATION: XR CHEST 1 VW - 12/08/2019     INDICATION: Shortness of air.     COMPARISON: 11/04/2019     FINDINGS: There is persistent right basilar effusion and either  atelectasis or pneumonia, a little increased. There is also diffusely  increased interstitial disease, perihilar in distribution, which  suggests superimposed interstitial edema. Heart remains mildly enlarged.  No pneumothorax is seen. No left lung effusion or consolidation is seen.            Impression:       1.  Persistent right basilar atelectasis or pneumonia, and some effusion,  a little increased from the prior study.  2. Interval development of perihilar interstitial disease, which may be  superimposed mild pulmonary interstitial edema.     DICTATED:   12/08/2019  EDITED/ls :   12/08/2019      This report was finalized on 12/8/2019 5:59 PM by DR. Bret Julien MD.           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] Yes   • Hyponatremia [E87.1] Yes   • Malignant pleural effusion [J91.0] Yes   • CAD s/p MI and CABG in 1993 [I25.10] Yes   • Generalized osteoarthrosis, involving multiple sites [M15.9] Yes   • Hypertension [I10] Yes   • Lumbosacral spondylosis without myelopathy [M47.817] Yes     Neuroendocrine carcinoma with brain mets  --s/p chemotherapy, but on hold pending further input from oncology  --brain mets s/p XRT    Superimposed pneumonia on imaging  --Zosyn/Vanc  --nebs/pulmonary toilet    Episodic hematochezia  --monitor, possibly secondary to hemorrhoid    Hyponatremia  --monitor    HTN  --home meds as tolerated    DVT prophylaxis:  WILFRIDO    CODE STATUS:    Code Status and Medical Interventions:   Ordered at: 12/08/19 1918     Level Of Support Discussed With:    Patient     Code Status:    CPR     Medical Interventions (Level of Support Prior to Arrest):    Full       Admission Status:  I believe this patient meets inpatient criteria given his progression and severity of findings.  Electronically signed by Bret Giron MD, 12/08/19, 7:48 PM.      Electronically signed by Bret Giron MD at 12/08/19 1953          Emergency Department Notes      Gaurav Valdez MD at 12/08/19 1626          Subjective   Darron Martino is a 87 y.o.male who presents to the emergency department with complaints of  shortness of breath. The patient reports shortness of breath for one week with worsening shortness of breath today. His shortness of breath is accompanied by a productive cough, chest tightness, and right upper back pain, but he denies fever and vomiting. In an effort to relieve his discomfort, he took 1200 mg Mucinex which was effective yesterday but ineffective today. The patient's medical history consists of stage four lung cancer and he was diagnosed with pneumonia three weeks ago. He received treatment for his pneumonia and felt improved. His oncologist is Dr. Nik Vale, and his primary care physician is Dr. Greene. The patient is scheduled for an appointment with Dr. Vale in two days. He uses 2.5 L of oxygen every night. There are no other acute complaints at this time.      History provided by:  Patient  Shortness of Breath   Severity:  Moderate  Onset quality:  Sudden  Duration:  1 week  Timing:  Constant  Progression:  Worsening  Chronicity:  Chronic  Ineffective treatments:  Oxygen (Mucinex)  Associated symptoms: cough and sputum production    Associated symptoms: no fever and no vomiting  Sore throat:      Risk factors: hx of cancer        Review of Systems   Constitutional: Negative for fever.   HENT: Sore throat:      Respiratory: Positive for cough, sputum production, chest tightness and shortness of breath.    Gastrointestinal: Negative for vomiting.   Musculoskeletal: Positive for back pain.   All other systems reviewed and are negative.      Past Medical History:   Diagnosis Date   • Abnormal glucose    • Acquired hyperlipoproteinemia    • Acute bronchitis    • Allergic rhinitis    • Arteriosclerosis of coronary artery    • Cellulitis and abscess    • Cough    • Edema extremities    • Elevated PSA    • Encounter for prostate cancer screening    • Generalized osteoarthrosis, involving multiple sites    • Hypertension    • Lumbosacral spondylosis without myelopathy    • Pain in joint,  "pelvic region and thigh    • Preventative health care    • Right groin pain    • Transient cerebral ischemia    • Ulnar neuropathy    • Upper back pain on right side    • UTI (urinary tract infection)    • Varicose veins        Allergies   Allergen Reactions   • Norvasc [Amlodipine Besylate] Shortness Of Breath     Severe shortness of breath as soon as he takes it   • Other Shortness Of Breath     Pt reports was started on a medication similar to norvasc but can't remember the name but had same reaction to it as he did the norvasc   • Hydrocodone GI Intolerance     Severe constipation; Originally listed as \"other\" with hydrocodone/apap in the comment field.        Past Surgical History:   Procedure Laterality Date   • CARDIAC SURGERY     • INGUINAL HERNIA REPAIR     • LEG SURGERY         Family History   Problem Relation Age of Onset   • Hypertension Mother    • Arthritis Father        Social History     Socioeconomic History   • Marital status:      Spouse name: Not on file   • Number of children: Not on file   • Years of education: Not on file   • Highest education level: Not on file   Tobacco Use   • Smoking status: Former Smoker     Packs/day: 2.00     Years: 48.00     Pack years: 96.00     Types: Cigarettes     Last attempt to quit:      Years since quittin.9   • Smokeless tobacco: Never Used   Substance and Sexual Activity   • Alcohol use: Yes     Comment: occasional   • Drug use: No   • Sexual activity: Never         Objective   Physical Exam   Constitutional: He is oriented to person, place, and time. He appears well-developed and well-nourished. No distress.   HENT:   Head: Normocephalic and atraumatic.   Eyes: Conjunctivae are normal. No scleral icterus.   Neck: Normal range of motion. Neck supple.   Cardiovascular: Normal rate, regular rhythm and normal heart sounds.   Pulmonary/Chest: Tachypnea noted. No respiratory distress.   Coarse breath sounds throughout the right lung field, " "tachypnea    Abdominal: Soft. There is no tenderness.   Musculoskeletal: Normal range of motion.   Neurological: He is alert and oriented to person, place, and time.   Skin: Skin is warm and dry.   Psychiatric: He has a normal mood and affect. His behavior is normal.   Nursing note and vitals reviewed.      Procedures        ED Course     I reviewed his charts.  He has advanced cancer, and Dr Vale's most recent note mentions failure to respond to chemo and that pt will likely need to transition into Hospice.  Pt is aware of this, says to me \"I think my time is up\" but he was told plan was repeat scans and determining course of action based on those.  He had the scans two days ago but has not met with Nik to discuss them and is not willing to consider comfort care/Hospice until that happens.  Until then he wishes full care.    I reviewed the CT scans with Dr Julien.  They show extensive R lung pneumonia, mild L lung pneumonia, and apparently very mild progression of the cancer.  I discussed this with patient.    Labs are benign.  He feels better with meds.  HCAP abx ordered with fluids.  Patient stable on serial rechecks.  Discussed exam findings, test results so far and concerns in detail at the bedside.  Discussed need for admission for further evaluation and treatment.                  MDM  Number of Diagnoses or Management Options  HCAP (healthcare-associated pneumonia):      Amount and/or Complexity of Data Reviewed  Clinical lab tests: reviewed and ordered  Tests in the radiology section of CPT®:  reviewed and ordered  Decide to obtain previous medical records or to obtain history from someone other than the patient: yes  Review and summarize past medical records: yes  Discuss the patient with other providers: yes  Independent visualization of images, tracings, or specimens: yes        Final diagnoses:   HCAP (healthcare-associated pneumonia)       Documentation assistance provided by jacquie DENNIS" Estelle.  Information recorded by the scribe was done at my direction and has been verified and validated by me.     Phil Mccabe  12/08/19 9782       Gaurav Valdez MD  12/08/19 2317      Electronically signed by Gaurav Valdez MD at 12/08/19 2317       Hospital Medications (active)       Dose Frequency Start End    aspirin EC tablet 81 mg 81 mg Daily 12/9/2019     Admin Instructions: Do not exceed 4 grams of aspirin in a 24 hr period.<BR><BR>If given for pain, use the following pain scale: <BR>Mild Pain = Pain Score of 1-3, CPOT 1-2<BR>Moderate Pain = Pain Score of 4-6, CPOT 3-4<BR>Severe Pain = Pain Score of 7-10, CPOT 5-8    Route: Oral    guaiFENesin (MUCINEX) 12 hr tablet 600 mg 600 mg Every 12 Hours Scheduled 12/8/2019     Admin Instructions: Caution: Look alike/sound alike drug alert               Do not crush, split, or chew.    Route: Oral    heparin (porcine) 5000 UNIT/ML injection 5,000 Units 5,000 Units Every 8 Hours Scheduled 12/8/2019     Route: Subcutaneous    HYDROmorphone (DILAUDID) injection 0.5 mg 0.5 mg Every 2 Hours PRN 12/8/2019 12/18/2019    Admin Instructions: If given for pain, use the following pain scale:<BR>Mild Pain = Pain Score of 1-3, CPOT 1-2<BR>Moderate Pain = Pain Score of 4-6, CPOT 3-4<BR>Severe Pain = Pain Score of 7-10, CPOT 5-8    Route: Intravenous    ipratropium-albuterol (DUO-NEB) nebulizer solution 3 mL 3 mL 4 Times Daily - RT 12/8/2019     Route: Nebulization    ipratropium-albuterol (DUO-NEB) nebulizer solution 3 mL 3 mL Every 4 Hours PRN 12/8/2019     Route: Nebulization    lactated ringers infusion 100 mL/hr Continuous 12/8/2019     Route: Intravenous    metoprolol succinate XL (TOPROL-XL) 24 hr tablet 50 mg 50 mg Daily 12/9/2019     Admin Instructions: Do not crush or chew.    Route: Oral    ondansetron (ZOFRAN) injection 4 mg 4 mg Every 6 Hours PRN 12/8/2019     Admin Instructions: If BOTH ondansetron (ZOFRAN) and promethazine (PHENERGAN) are  "ordered use ondansetron first and THEN promethazine IF ondansetron is ineffective.    Route: Intravenous    Linked Group 1:  \"Or\" Linked Group Details        ondansetron (ZOFRAN) tablet 4 mg 4 mg Every 6 Hours PRN 12/8/2019     Admin Instructions: If BOTH ondansetron (ZOFRAN) and promethazine (PHENERGAN) are ordered use ondansetron first and THEN promethazine IF ondansetron is ineffective.    Route: Oral    Linked Group 1:  \"Or\" Linked Group Details        polyethylene glycol (MIRALAX) packet 17 g 17 g 2 Times Daily 12/8/2019     Admin Instructions: Mix dose in 6-8 ounces of water.<BR>Dissolve in water    Route: Oral    saccharomyces boulardii (FLORASTOR) capsule 250 mg 250 mg 2 Times Daily 12/8/2019     Route: Oral    sodium chloride 0.9 % flush 10 mL 10 mL As Needed 12/8/2019     Route: Intravenous    Cosign for Ordering: Accepted by Gaurav Valdez MD on 12/8/2019  5:33 PM    sodium chloride 0.9 % flush 10 mL 10 mL Every 12 Hours Scheduled 12/8/2019     Route: Intravenous    sodium chloride 0.9 % flush 10 mL 10 mL As Needed 12/8/2019     Route: Intravenous             Physician Progress Notes (last 72 hours) (Notes from 12/06/19 1214 through 12/09/19 1214)      Patricia Tracy APRN at 12/09/19 0241        S: Nursing staff called to inform the patient was experiencing shortness of breath with lung sounds demonstrating crackles. Breathing treatment failed to improve shortness of air. Patient is requiring 4 liter per NC to maintain sat >90%. Patient is also experiencing nausea.     O:   General: A&O x3, moderate distress due to shortness of breath  Cardio: RRR  Lungs: diminished lung sounds in the bases with coarse crackles throughout all lung fields  ABD: distended but non-tender.     A: Fluid vol overload    P: Patient received Lasix 40 mg IV x1, ABG and BNP. Holding IV fluid for now. Daily weight-strict I&O.              Electronically signed by Patricia Tracy APRN at 12/09/19 0249          Consult " "Notes (last 72 hours) (Notes from 12/06/19 1214 through 12/09/19 1214)      Ninfa Vale MD at 12/09/19 0808          REFERRING PHYSICIAN: Julio C Gaming MD    PRIMARY CARE PROVIDER: Duong Greene MD    REASON FOR CONSULTATION: Metastatic neuroendocrine carcinoma      HISTORY OF PRESENT ILLNESS: 87-year-old gentleman with metastatic neuroendocrine carcinoma who had been treated on second line Taxol recently with decline in overall health.  I performed a CAT scan on Friday due to concern of progressive disease.  He presents to the hospital with worsening respiratory decline.      Allergies   Allergen Reactions   • Norvasc [Amlodipine Besylate] Shortness Of Breath     Severe shortness of breath as soon as he takes it   • Other Shortness Of Breath     Pt reports was started on a medication similar to norvasc but can't remember the name but had same reaction to it as he did the norvasc   • Hydrocodone GI Intolerance     Severe constipation; Originally listed as \"other\" with hydrocodone/apap in the comment field.        Past Medical History:   Diagnosis Date   • Abnormal glucose    • Acquired hyperlipoproteinemia    • Acute bronchitis    • Allergic rhinitis    • Arteriosclerosis of coronary artery    • Cellulitis and abscess    • Cough    • Edema extremities    • Elevated PSA    • Encounter for prostate cancer screening    • Generalized osteoarthrosis, involving multiple sites    • Hypertension    • Lumbosacral spondylosis without myelopathy    • Pain in joint, pelvic region and thigh    • Preventative health care    • Right groin pain    • Transient cerebral ischemia    • Ulnar neuropathy    • Upper back pain on right side    • UTI (urinary tract infection)    • Varicose veins          Current Facility-Administered Medications:   •  [START ON 12/10/2019] ! Vancomycin trough Tuesday at 1130. Hold noon dose until reviewed by pharmacy, , Does not apply, Once, Corky Kang IV, [facility]  •  aspirin EC " tablet 81 mg, 81 mg, Oral, Daily, Bret Giron MD  •  guaiFENesin (MUCINEX) 12 hr tablet 600 mg, 600 mg, Oral, Q12H, Bret Giron MD, 600 mg at 12/08/19 2206  •  heparin (porcine) 5000 UNIT/ML injection 5,000 Units, 5,000 Units, Subcutaneous, Q8H, Bret Giron MD, 5,000 Units at 12/09/19 0552  •  HYDROmorphone (DILAUDID) injection 0.5 mg, 0.5 mg, Intravenous, Q2H PRN, Bret Giron MD, 0.5 mg at 12/09/19 0314  •  ipratropium-albuterol (DUO-NEB) nebulizer solution 3 mL, 3 mL, Nebulization, 4x Daily - RT, Bret Giron MD, 3 mL at 12/08/19 2352  •  ipratropium-albuterol (DUO-NEB) nebulizer solution 3 mL, 3 mL, Nebulization, Q4H PRN, Bret Giron MD  •  lactated ringers infusion, 100 mL/hr, Intravenous, Continuous, Bret Giron MD  •  metoprolol succinate XL (TOPROL-XL) 24 hr tablet 50 mg, 50 mg, Oral, Daily, Bret Giron MD  •  ondansetron (ZOFRAN) tablet 4 mg, 4 mg, Oral, Q6H PRN **OR** ondansetron (ZOFRAN) injection 4 mg, 4 mg, Intravenous, Q6H PRN, Bret Giron MD, 4 mg at 12/08/19 2347  •  Pharmacy to dose vancomycin, , Does not apply, Continuous PRN, Bret Giron MD  •  piperacillin-tazobactam (ZOSYN) 3.375 g in iso-osmotic dextrose 50 ml (premix), 3.375 g, Intravenous, Q8H, Bret Giron MD, 3.375 g at 12/08/19 2344  •  polyethylene glycol (MIRALAX) packet 17 g, 17 g, Oral, BID, Bret Giron MD, 17 g at 12/08/19 2142  •  promethazine (PHENERGAN) injection 12.5 mg, 12.5 mg, Intravenous, Once, Patricia Tracy APRN  •  saccharomyces boulardii (FLORASTOR) capsule 250 mg, 250 mg, Oral, BID, Bret Giron MD, 250 mg at 12/08/19 2142  •  sodium chloride 0.9 % flush 10 mL, 10 mL, Intravenous, PRN, Bret Giron MD  •  sodium chloride 0.9 % flush 10 mL, 10 mL, Intravenous, Q12H, Bret Giron MD, 10 mL at 12/08/19 2143  •  sodium chloride 0.9 % flush 10 mL, 10 mL, Intravenous, PRN, Bret Giron MD  •  vancomycin 1250 mg/250 mL 0.9% NS IVPB ([facility]), 15 mg/kg,  Intravenous, Q12H, Corky Kang IV, RPH    Past Surgical History:   Procedure Laterality Date   • CARDIAC SURGERY     • INGUINAL HERNIA REPAIR     • LEG SURGERY         Social History     Socioeconomic History   • Marital status:      Spouse name: Not on file   • Number of children: Not on file   • Years of education: Not on file   • Highest education level: Not on file   Tobacco Use   • Smoking status: Former Smoker     Packs/day: 2.00     Years: 48.00     Pack years: 96.00     Types: Cigarettes     Last attempt to quit:      Years since quittin.9   • Smokeless tobacco: Never Used   Substance and Sexual Activity   • Alcohol use: Yes     Comment: occasional   • Drug use: No   • Sexual activity: Never       Family History   Problem Relation Age of Onset   • Hypertension Mother    • Arthritis Father        Oncology/Hematology History    1.  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  2.  Plan for palliative WBRT starting 2019  3.  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  Patient had Tecentriq added to his regimen with cycle 2.          Neuroendocrine carcinoma of lung (CMS/HCC)    2019 Initial Diagnosis     Neuroendocrine carcinoma of lung (CMS/HCC)      2019 -  Chemotherapy     OP LUNG Etoposide / CARBOplatin AUC=5        2019 - 2019 Radiation     Radiation OncologyTreatment Course:  Darron Martino received 3000 cGy in 10 fractions to brain via External Beam Radiation - EBRT.      2019 - 2019 Chemotherapy     OP LUNG Atezolizumab / CARBOplatin AUC=5 / Etoposide (SCLC)        11/15/2019 -  Chemotherapy     OP LUNG PACLitaxel 80 mg/m2 D1,8,15             REVIEW OF SYSTEMS:  A 14 point review of systems was performed and is negative except as noted below.    Review of Systems   Constitutional:  Positive for appetite change, fatigue and unexpected weight change.   HENT:  Negative.    Eyes: Negative.    Respiratory: Positive for cough and shortness of breath.    Gastrointestinal: Negative.    Endocrine: Negative.    Genitourinary: Negative.     Neurological: Positive for extremity weakness.   Hematological: Negative.    Psychiatric/Behavioral: The patient is nervous/anxious.          Objective     Vitals:    12/09/19 0050 12/09/19 0544 12/09/19 0553 12/09/19 0707   BP:   108/62 143/79   BP Location:   Left arm Left arm   Patient Position:   Lying Lying   Pulse:    84   Resp: 20 20 22   Temp:   98.2 °F (36.8 °C) 97.9 °F (36.6 °C)   TempSrc:   Oral Oral   SpO2: 90%  92% 92%   Weight:  78.7 kg (173 lb 9.6 oz)     Height:           Temp:  [97.6 °F (36.4 °C)-98.2 °F (36.8 °C)] 97.9 °F (36.6 °C)     Performance Status: 4    Physical Exam    General: frail male in resp distress  HEENT: sclerae anicteric, oropharynx clear  Lymphatics: no cervical, supraclavicular, or axillary adenopathy  Cardiovascular: regular rate and rhythm, no murmurs  Lungs: bilteral rhonchi  Abdomen: soft, nontender, nondistended.  No palpable organomegaly  Extremities: no lower extremity edema  Skin: no rashes, lesions, bruising, or petechiae      LABS:    Lab Results   Component Value Date    HGB 10.9 (L) 12/08/2019    HCT 32.4 (L) 12/08/2019    MCV 89.8 12/08/2019     12/08/2019    WBC 7.21 12/08/2019    NEUTROABS 4.65 12/08/2019    LYMPHSABS 1.17 12/08/2019    MONOSABS 1.16 (H) 12/08/2019    EOSABS 0.09 12/08/2019    BASOSABS 0.07 12/08/2019     Lab Results   Component Value Date    GLUCOSE 101 (H) 12/09/2019    BUN 6 (L) 12/09/2019    CREATININE 0.64 (L) 12/09/2019     (L) 12/09/2019    K 3.9 12/09/2019    CL 98 12/09/2019    CO2 22.0 12/09/2019    CALCIUM 8.3 (L) 12/09/2019    PROTEINTOT 5.8 (L) 12/08/2019    ALBUMIN 3.10 (L) 12/08/2019    BILITOT 0.8 12/08/2019    ALKPHOS 96 12/08/2019    AST 23 12/08/2019    ALT 6  12/08/2019         IMAGING    Ct Chest With Contrast    Result Date: 12/8/2019  EXAMINATION: CT CHEST W CONTRAST, CT ABDOMEN/PELVIS W CONTRAST - 12/06/2019  INDICATION: C7A.8-Other malignant neuroendocrine tumors.  TECHNIQUE: 5 mm post IV contrast images through the chest and 5 mm postoral and IV contrast portal venous phase and delayed venous phase images through the abdomen and pelvis.  The radiation dose reduction device was turned on for each scan per the ALARA (As Low as Reasonably Achievable) protocol.  COMPARISON: 11/04/2019  FINDINGS: There is mild generalized progression of mediastinal adenopathy and moderate progression of right pericardial adenopathy. There is no pericardial effusion and only a trace amount of right basilar pleural effusion. There is increasingly dense consolidation of the right lower lobe and new multifocal interstitial and airspace disease, presumably pneumonia in the right upper lobe, posterior left upper lobe and left lower lobe. Appearance would be  unusual for interstitial spread of malignancy. A few very small nonspecific pulmonary nodules are again seen. Bony structures appear to be intact.      1. Generalized progression of mediastinal adenopathy since 11/04/2019. 2. Worsening consolidation of the right lower lobe. 3. New multifocal interstitial disease bilaterally, which appears to represent superimposed pneumonia.  ABDOMEN AND PELVIS CT SCAN WITH ORAL AND IV CONTRAST: No hepatic lesions are identified. Spleen is not enlarged. No significant abnormalities are seen of the pancreas or kidneys. Small adrenal nodules have decreased in size and appear necrotic. Gallbladder appears normal. No upper abdominal adenopathy, ascites or acute inflammatory change is seen. Bowel loops are grossly normal in appearance.  Regarding the lower abdomen and pelvis, no mass, adenopathy, ascites or acute inflammatory focus is appreciated. Patient's infrarenal abdominal aortic aneurysm appears stable  at 34 mm. Bony structures appear to be intact.  IMPRESSION: 1. Decreased size of the patient's bilateral adrenal nodules compared to 11/04/2019 study. 2. No new intra-abdominal or intrapelvic metastatic disease is seen. 3. No new disease is identified elsewhere.  DICTATED:   12/08/2019 EDITED/ls :   12/08/2019      Ct Abdomen Pelvis With Contrast    Result Date: 12/8/2019  EXAMINATION: CT CHEST W CONTRAST, CT ABDOMEN/PELVIS W CONTRAST - 12/06/2019  INDICATION: C7A.8-Other malignant neuroendocrine tumors.  TECHNIQUE: 5 mm post IV contrast images through the chest and 5 mm postoral and IV contrast portal venous phase and delayed venous phase images through the abdomen and pelvis.  The radiation dose reduction device was turned on for each scan per the ALARA (As Low as Reasonably Achievable) protocol.  COMPARISON: 11/04/2019  FINDINGS: There is mild generalized progression of mediastinal adenopathy and moderate progression of right pericardial adenopathy. There is no pericardial effusion and only a trace amount of right basilar pleural effusion. There is increasingly dense consolidation of the right lower lobe and new multifocal interstitial and airspace disease, presumably pneumonia in the right upper lobe, posterior left upper lobe and left lower lobe. Appearance would be  unusual for interstitial spread of malignancy. A few very small nonspecific pulmonary nodules are again seen. Bony structures appear to be intact.      1. Generalized progression of mediastinal adenopathy since 11/04/2019. 2. Worsening consolidation of the right lower lobe. 3. New multifocal interstitial disease bilaterally, which appears to represent superimposed pneumonia.  ABDOMEN AND PELVIS CT SCAN WITH ORAL AND IV CONTRAST: No hepatic lesions are identified. Spleen is not enlarged. No significant abnormalities are seen of the pancreas or kidneys. Small adrenal nodules have decreased in size and appear necrotic. Gallbladder appears normal. No  upper abdominal adenopathy, ascites or acute inflammatory change is seen. Bowel loops are grossly normal in appearance.  Regarding the lower abdomen and pelvis, no mass, adenopathy, ascites or acute inflammatory focus is appreciated. Patient's infrarenal abdominal aortic aneurysm appears stable at 34 mm. Bony structures appear to be intact.  IMPRESSION: 1. Decreased size of the patient's bilateral adrenal nodules compared to 11/04/2019 study. 2. No new intra-abdominal or intrapelvic metastatic disease is seen. 3. No new disease is identified elsewhere.  DICTATED:   12/08/2019 EDITED/ls :   12/08/2019      Xr Chest 1 View    Result Date: 12/8/2019   EXAMINATION: XR CHEST 1 VW - 12/08/2019  INDICATION: Shortness of air.  COMPARISON: 11/04/2019  FINDINGS: There is persistent right basilar effusion and either atelectasis or pneumonia, a little increased. There is also diffusely increased interstitial disease, perihilar in distribution, which suggests superimposed interstitial edema. Heart remains mildly enlarged. No pneumothorax is seen. No left lung effusion or consolidation is seen.         1. Persistent right basilar atelectasis or pneumonia, and some effusion, a little increased from the prior study. 2. Interval development of perihilar interstitial disease, which may be superimposed mild pulmonary interstitial edema.  DICTATED:   12/08/2019 EDITED/ls :   12/08/2019  This report was finalized on 12/8/2019 5:59 PM by DR. Bret Julien MD.        ASSESSMENT/PLAN:    1.  Metastatic neuroendocrine carcinoma of the lung.  I reviewed the CAT scans which shows progressive disease in the mediastinum.  Does appear to have bilateral pneumonia though this is more likely to be lymphangitic spread considering he is not febrile.  Regardless he is not a candidate for any further treatment.  I spoke to him about hospice care.  I will consult hospice to assist in transitioning him to home.        Ninfa Vale,  MD    12/9/2019      Please note that portions of this note may have been completed with a voice recognition program. Efforts were made to edit the dictations, but occasionally words are mistranscribed.    Electronically signed by Ninfa Vale MD at 12/09/19 0811     Corky Kang IV [facility] at 12/08/19 2059          Pharmacokinetic Consult - Vancomycin Initiation    Pharmacy consulted to manage vancomycin therapy for this 88 yo male with PNA .     Consulting Provider: Hospitalist Group  Indication: PNA  Target trough: 15-20 mcg/mL    Objective    Wt Readings from Last 3 Encounters:   12/08/19 78.6 kg (173 lb 3.2 oz)   12/06/19 76.2 kg (168 lb)   11/22/19 75.8 kg (167 lb)     Temp Readings from Last 3 Encounters:   12/08/19 97.7 °F (36.5 °C) (Oral)   12/06/19 97.6 °F (36.4 °C) (Temporal)   11/22/19 98.1 °F (36.7 °C) (Temporal)     BP Readings from Last 3 Encounters:   12/08/19 107/57   12/06/19 121/58   11/22/19 118/58     Pulse Readings from Last 3 Encounters:   12/08/19 70   12/06/19 67   11/22/19 70       Results from last 7 days   Lab Units 12/08/19  1700   SODIUM mmol/L 130*   POTASSIUM mmol/L 3.7   CHLORIDE mmol/L 97*   CO2 mmol/L 23.0   BUN mg/dL 6*   CREATININE mg/dL 0.55*   GLUCOSE mg/dL 103*   CALCIUM mg/dL 8.8       Estimated Creatinine Clearance: 72.3 mL/min (A) (by C-G formula based on SCr of 0.55 mg/dL (L)).    I/O last 3 completed shifts:  In: 1100 [IV Piggyback:1100]  Out: -     Results from last 7 days   Lab Units 12/08/19  1700   WBC 10*3/mm3 7.21   HEMOGLOBIN g/dL 10.9*   HEMATOCRIT % 32.4*   PLATELETS 10*3/mm3 170     Procalcitonin   Status:  Final result   Component  Ref Range & Units 12/08/19 1700   Procalcitonin  0.10 - 0.25 ng/mL 0.10    Resulting Agency [facility]   Narrative     As a Marker for Sepsis (Non-Neonates):   1. <0.5 ng/mL represents a low risk of severe sepsis and/or septic shock.  1. >2 ng/mL represents a high risk of severe sepsis and/or septic shock.    As a Marker  for Lower Respiratory Tract Infections that require antibiotic therapy:  PCT on Admission     Antibiotic Therapy             6-12 Hrs later  > 0.5                Strongly Recommended            >0.25 - <0.5         Recommended  0.1 - 0.25           Discouraged                   Remeasure/reassess PCT  <0.1                 Strongly Discouraged          Remeasure/reassess PCT            Lactate   Date Value Ref Range Status   12/08/2019 0.9 0.5 - 2.0 mmol/L Final     Comment:     Falsely depressed results may occur on samples drawn from patients receiving N-Acetylcysteine (NAC) or Metamizole.       No results found for: ACANTHNAEG, AFBCX, BPERTUSSISCX, BLOODCX  No results found for: BCIDPCR, CXREFLEX, CSFCX, CULTURETIS  No results found for: CULTURES, HSVCX, URCX  No results found for: EYECULTURE, GCCX, LABHSV  No results found for: LEGIONELLA, MRSACX, MUMPSCX, MYCOPLASCX  No results found for: NOCARDIACX, STOOLCX  No results found for: THROATCX, UNSTIMCULT, URINECX, CULTURE, VZVCULTUR  No results found for: VIRALCULTU, WOUNDCX    No results found for: RESPCX    ANTI-INFECTIVE I-VENT REVIEW    Patient: Darron Martino  MRN#: [id number]  Attending: No name on file.  Admission Date: 120819    Current Antimicrobial Medications  Anti-Infectives (From admission, onward)      Ordered     Dose/Rate Route Frequency Start Stop    12/08/19 2054  ! Vancomycin trough Tuesday at 1130. Hold noon dose until reviewed by pharmacy     Ordering Provider:  Corky Kang IV, RPH     Does not apply Once 12/10/19 1100      12/08/19 2054  vancomycin 1250 mg/250 mL 0.9% NS IVPB ([facility])     Ordering Provider:  Corky Kang IV, RPH    15 mg/kg × 75.8 kg  over 90 Minutes Intravenous Every 12 Hours 12/09/19 1200 12/16/19 1159    12/08/19 2050  piperacillin-tazobactam (ZOSYN) 3.375 g in iso-osmotic dextrose 50 ml (premix)     Ordering Provider:  Bret Giron MD    3.375 g  over 4 Hours Intravenous Every 8 Hours 12/09/19 0000  12/15/19 2359    12/08/19 2054  vancomycin 1750 mg/500 mL 0.9% NS IVPB ([facility])     Ordering Provider:  Corky Kang IV [facility]    23 mg/kg × 75.8 kg  over 2 Hours Intravenous Once 12/08/19 2145 12/08/19 2050  Pharmacy to dose vancomycin     Ordering Provider:  Bret Giron MD     Does not apply Continuous PRN 12/08/19 2050 12/15/19 2049    12/08/19 1645  levoFLOXacin (LEVAQUIN) 750 mg/150 mL D5W (premix) (LEVAQUIN) 750 mg     Ordering Provider:  Gaurav Valdez MD    750 mg  over 90 Minutes Intravenous Once 12/08/19 1647 12/08/19 2027 12/08/19 1645  piperacillin-tazobactam (ZOSYN) 4.5 g in iso-osmotic dextrose 100 mL IVPB (premix)     Ordering Provider:  Gaurav Valdez MD    4.5 g Intravenous Once 12/08/19 1647 12/08/19 1800        Assessment/Plan    Will load patient with 1750 mg (23 mg/kg) now then begin maintenance therapy tomorrow at noon with 1250 mg (15 mg/kg) IV q12h. Will check serum vancomycin level Tuesday prior to 4th dose. Pharmacy will continue to follow closely and adjust therapy as needed for target trough 15-20 mcg/mL.    MRSA PCR ordered per protocol    Thank you for this consult.  Corky Kang IV, PharmD, BCPS  12/8/2019  8:59 PM          Electronically signed by Corky Kang IV [facility] at 12/08/19 2059

## 2019-12-09 NOTE — PLAN OF CARE
New Palliative consult on 12/9/2019 at 0129 for GOC/ACP. Seen By Dr. Alexander Monroy on 12/9/2019 at 1230.

## 2019-12-09 NOTE — PLAN OF CARE
Pt arrived on floor @ 2053. Was on 3L nasal cannula. Pt desats with any exertion, bumped O2 to 4L nasal cannula. Around 0000 Pt complained of SOA, coarse crackles., Respiratory bumped him to 6L nasal cannula, APRN notified. Bladder Scanned pt, 484 mL in bladder, straight cathed pt, got 450 mL out. IV lasix given, Echo ordered. Pro BNP was 624.3, PH, 7.416, Co2 35.9. Po2 was 58.1. Nausea is not controlled with zofran, No complaints of pain. Will continue to monitor .

## 2019-12-10 NOTE — NURSING NOTE
At 0137 pt became agitated,would not follow commands, combative towards staff, and threatened staff. Also patient kept ripping oxygen off and trying to get out of bed. For patients safety and staff safety restraint  Order obtained per Patricia NJ.  Attempted to contact pt's wife at 0150, and 0249, no answer, left message to call back. Reassessed needs for restraints at 0323, pt was alert and oriented, followed commands, was calm. Restraints were removed. Pt alert and oriented. Will continue to monitor.        Pt's wife called back at 0541

## 2019-12-10 NOTE — PLAN OF CARE
Problem: Patient Care Overview  Goal: Interprofessional Rounds/Family Conf  Outcome: Ongoing (interventions implemented as appropriate)  Flowsheets (Taken 12/10/2019 5020)  Participants: advanced practice nurse; nursing; physician; social work/services  Note:   Patient reports that he had a rough night. He was placed on high flow oxygen. Patient and wife request to talk to Dr. Zaragoza before making any decisions about his goals and plan of care. I called Dr. Zaragoza office and spoke to his staff and they state to have [facility] place on his patient list and he will see. [facility] placed patient on Dr. Zaragoza  patient list. Dr. Valdez reports she did see patient and both him and his wife reports they want to wait until his Oncologist see him. Palliative will continue to follow for support, symptom management, goals and discharge needs.

## 2019-12-10 NOTE — PROGRESS NOTES
[facility] Medicine Services  PROGRESS NOTE    Patient Name: Darron Martino  : 7/3/1932  MRN: [id number]    Date of Admission: 2019  Primary Care Physician: Duong Greene MD    Subjective   Subjective     CC: Shortness of breath    HPI: No family in room today.  He says he has no needs at this time.  His breathing is junky and he is on Mucinex.  Denies bladder or bowel complaints    Review of Systems    Gen- No fevers, chills  CV- No chest pain, palpitations  Resp- +cough, + shortness of breath  GI- No N/V/D, abd pain    Objective   Objective     Vital Signs:   Temp:  [97.7 °F (36.5 °C)-98.2 °F (36.8 °C)] 97.7 °F (36.5 °C)  Heart Rate:  [69-84] 80  Resp:  [20-22] 20  BP: (107-143)/(56-79) 119/64        Physical Exam:    Constitutional: No acute distress, awake, alert  HENT: NCAT, dry tongue  Respiratory: Poor inspiratory effort, junky breath sounds with heavy secretions, mild work of breathing  Cardiovascular: RRR, no murmurs, rubs, or gallops, palpable pedal pulses bilaterally  Gastrointestinal: Positive bowel sounds, soft, nontender, nondistended  Musculoskeletal: No bilateral ankle edema  Psychiatric: Appropriate affect, cooperative  Neurologic: Oriented x 3, strength symmetric in all extremities, Cranial Nerves grossly intact to confrontation, speech clear  Skin: No rashes      Results Reviewed:    Results from last 7 days   Lab Units 19  1700   WBC 10*3/mm3 7.21   HEMOGLOBIN g/dL 10.9*   HEMATOCRIT % 32.4*   PLATELETS 10*3/mm3 170   PROCALCITONIN ng/mL 0.10     Results from last 7 days   Lab Units 19  0039 19  1700   SODIUM mmol/L 129* 130*   POTASSIUM mmol/L 3.9 3.7   CHLORIDE mmol/L 98 97*   CO2 mmol/L 22.0 23.0   BUN mg/dL 6* 6*   CREATININE mg/dL 0.64* 0.55*   GLUCOSE mg/dL 101* 103*   CALCIUM mg/dL 8.3* 8.8   ALT (SGPT) U/L  --  6   AST (SGOT) U/L  --  23   TROPONIN T ng/mL  --  <0.010   PROBNP pg/mL 624.3 530.2     Estimated Creatinine  Clearance: 72.2 mL/min (A) (by C-G formula based on SCr of 0.64 mg/dL (L)).    Microbiology Results Abnormal     Procedure Component Value - Date/Time    Blood Culture - Blood, Arm, Right [[id number]] Collected:  12/08/19 1700    Lab Status:  Preliminary result Specimen:  Blood from Arm, Right Updated:  12/09/19 1830     Blood Culture No growth at 24 hours    Blood Culture - Blood, Arm, Right [[id number]] Collected:  12/08/19 1710    Lab Status:  Preliminary result Specimen:  Blood from Arm, Right Updated:  12/09/19 1830     Blood Culture No growth at 24 hours    MRSA Screen, PCR - Swab, Nares [[id number]]  (Normal) Collected:  12/09/19 0054    Lab Status:  Final result Specimen:  Swab from Nares Updated:  12/09/19 1017     MRSA PCR Negative    Narrative:       MRSA Negative          Imaging Results (Last 24 Hours)     ** No results found for the last 24 hours. **          Results for orders placed during the hospital encounter of 12/08/19   Adult Transthoracic Echo Complete W/ Cont if Necessary Per Protocol    Narrative · Left ventricular systolic function is normal.  · EF appears to be in the range of 61 - 65%  · There is distal inferior and septal hypokinesis. Septal motion   consistent with postoperative state  · Left ventricular diastolic dysfunction is noted (grade I) consistent   with impaired relaxation.  · Mild aortic stenosis. Mean gradient 11 mmHg  · Mild mitral valve regurgitation is present          I have reviewed the medications:  Scheduled Meds:  aspirin 81 mg Oral Daily   guaiFENesin 600 mg Oral Q12H   heparin (porcine) 5,000 Units Subcutaneous Q8H   ipratropium-albuterol 3 mL Nebulization 4x Daily - RT   metoprolol succinate XL 50 mg Oral Daily   polyethylene glycol 17 g Oral BID   saccharomyces boulardii 250 mg Oral BID   sodium chloride 10 mL Intravenous Q12H     Continuous Infusions:   PRN Meds:.haloperidol lactate  •  HYDROmorphone  •  ipratropium-albuterol  •  LORazepam  •  ondansetron **OR**  ondansetron  •  sodium chloride  •  sodium chloride      Assessment/Plan   Assessment / Plan     Active Hospital Problems    Diagnosis  POA   • **Pneumonia [J18.9]  Yes   • Neuroendocrine carcinoma of lung (CMS/HCC) [C7A.8]  Yes   • Hyponatremia [E87.1]  Yes   • Malignant pleural effusion [J91.0]  Yes   • CAD s/p MI and CABG in 1993 [I25.10]  Yes   • Generalized osteoarthrosis, involving multiple sites [M15.9]  Yes   • Hypertension [I10]  Yes   • Lumbosacral spondylosis without myelopathy [M47.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        Brief Hospital Course to date:  Darron Martino is a 87 y.o. male who presented to the ER with shortness of breath.  May have progression of disease causing breathing issues.    Neuroendocrine cancer with diffuse mets  -Seen by oncology  -Possible progression of disease  -Hospice consult  -De-escalate antibiotics today and observe  Hematochezia  -Monitor  -Transfuse as needed  Hypertension  -Home medications as tolerated    Seen by hospice today with undetermined plan  Wife seems interested in inpatient hospice here    DVT Prophylaxis: Heparin 5000 units subcu every 8 hours    Disposition: I expect the patient to be discharged to hospice    CODE STATUS:   Code Status and Medical Interventions:   Ordered at: 12/09/19 1307     Limited Support to NOT Include:    Intubation    Antiarrhythmic Drugs    Cardioversion/Defibrillation    Vasopressors    NIPPV (Non-Invasive Positive Pressure Support)    Dialysis    Artificial Nutrition     Code Status:    No CPR     Medical Interventions (Level of Support Prior to Arrest):    Limited     Electronically signed by Julio C Gaming MD, 12/09/19, 8:21 PM.

## 2019-12-10 NOTE — CODE DOCUMENTATION
Rapid response was called at 2301 to obtain a stat ABG due to confusion and SOA. . Stat ABG drawn.EKG obtained. Blood glucose was 123. ABG  Results are not in computer, respiratory therapist notified. Paper copy of ABG results in patient's chart. Vitals were 105/57. AL 88, respirations were 20. SPO2 91% on 5 liters nasal cannula. Continued to monitor.

## 2019-12-10 NOTE — PLAN OF CARE
Problem: Patient Care Overview  Goal: Plan of Care Review  Outcome: Ongoing (interventions implemented as appropriate)  Flowsheets (Taken 12/10/2019 4408)  Progress: declining  Plan of Care Reviewed With: patient; spouse  Outcome Summary: pt states he is having pain 5/10 but refusing pain medication; remains on high flow NC which had to be adjusted once during shift r/t decreased o2 sats and soa; wife at bedside; will continue to monitor

## 2019-12-10 NOTE — PROGRESS NOTES
[facility] Medicine Services  CRITICAL CARE NOTE    Patient Name: Darron Martino  : 7/3/1932  MRN: [id number]    Date of Admission: 2019  Length of Stay: 2    Subjective     Event/HPI:  Patient confused, found patient with oxygen off with oxygen sat of 76%. A rapid response was called for support. ABG obtained and EKG. Respiratory recommended high flow oxygen.     Objective     Vital Signs:   Temp:  [97.7 °F (36.5 °C)-98.2 °F (36.8 °C)] 97.7 °F (36.5 °C)  Heart Rate:  [70-94] 84  Resp:  [20-22] 20  BP: (104-143)/(57-79) 122/67       Pertinent Physical Exam:  General: confused and agitiated, severly distress due to shortness of breath  Cardio: RRR  Lungs: diminished lung sounds in the bases with coarse crackles throughout all lung fields  ABD: distended but non-tender.     Results Reviewed:  I have personally reviewed current lab, radiology, and data and agree.    Results from last 7 days   Lab Units 19  1700   WBC 10*3/mm3 7.21   HEMOGLOBIN g/dL 10.9*   HEMATOCRIT % 32.4*   PLATELETS 10*3/mm3 170     Results from last 7 days   Lab Units 19  0039 19  1700   SODIUM mmol/L 129* 130*   POTASSIUM mmol/L 3.9 3.7   CHLORIDE mmol/L 98 97*   CO2 mmol/L 22.0 23.0   BUN mg/dL 6* 6*   CREATININE mg/dL 0.64* 0.55*   GLUCOSE mg/dL 101* 103*   CALCIUM mg/dL 8.3* 8.8   ALT (SGPT) U/L  --  6   AST (SGOT) U/L  --  23     No results found for: BNP  pH, Arterial   Date Value Ref Range Status   2019 7.416 7.350 - 7.450 pH units Final       Microbiology Results Abnormal     Procedure Component Value - Date/Time    Blood Culture - Blood, Arm, Right [[id number]] Collected:  19 1700    Lab Status:  Preliminary result Specimen:  Blood from Arm, Right Updated:  19 1830     Blood Culture No growth at 24 hours    Blood Culture - Blood, Arm, Right [[id number]] Collected:  19 1710    Lab Status:  Preliminary result Specimen:  Blood from Arm, Right Updated:  19  1830     Blood Culture No growth at 24 hours    MRSA Screen, PCR - Swab, Nares [[id number]]  (Normal) Collected:  12/09/19 0054    Lab Status:  Final result Specimen:  Swab from Nares Updated:  12/09/19 1017     MRSA PCR Negative    Narrative:       MRSA Negative          Imaging Results (Last 24 Hours)     ** No results found for the last 24 hours. **        Results for orders placed during the hospital encounter of 12/08/19   Adult Transthoracic Echo Complete W/ Cont if Necessary Per Protocol    Narrative · Left ventricular systolic function is normal.  · EF appears to be in the range of 61 - 65%  · There is distal inferior and septal hypokinesis. Septal motion   consistent with postoperative state  · Left ventricular diastolic dysfunction is noted (grade I) consistent   with impaired relaxation.  · Mild aortic stenosis. Mean gradient 11 mmHg  · Mild mitral valve regurgitation is present          I have reviewed the current medications.    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 Patient's spouse, the patient's power of , wishes patient to be No Code with no compression only. She was receptive to all other life saving measures to include intubation.     Confusion/agitation  -haldol as needed      CODE STATUS:    Code Status and Medical Interventions:   Ordered at: 12/09/19 1941     Limited Support to NOT Include:    Other     Level Of  Support Discussed With:    Health Care Surrogate     Code Status:    No CPR     Medical Interventions (Level of Support Prior to Arrest):    Limited     Limited Support to Also NOT Include:    health care surrogate does not want chest compressions       Electronically signed by CLEM Carey, 12/10/19, 2:40 AM.

## 2019-12-10 NOTE — PLAN OF CARE
Pt continues to have SOA, and became confused, pulling O2 off.   APRN notified. APRN evaluated pt, rapid response called to get stat ABG. Pt became agitated and combative towards staff, restraints applied at 0137. Restraints removed at 0323. Pt is alert and oriented now. Desats 80-83 with any exertion. Pt is on 7 liters venturi mask.

## 2019-12-10 NOTE — PROGRESS NOTES
"Continued Stay Note  Psychiatric     Patient Name: Darron Martino  MRN: [id number]  Today's Date: 12/10/2019    Admit Date: 12/8/2019    Discharge Plan     Row Name 12/10/19 1021       Plan    Plan  Undetermined    Plan Comments  Chart reviewed, noted that pt's 02 sats dropped during the night, pt became restless, started on high flow oxygen. Visit made to pt, wife present. pt up in recliner, high flow oxygen in place, though the cannula is sitting on top of pt's nose, readjusted the cannula. Pt wanting to use the BSC, nurse arrived to assist pt to the BSC. This writer and pt's wife met outside pt's room. Discussed goals of care for pt with wife, stated is undetermined, is waiting to speak with Dr. Vale, and palliative physician before making any decisions. Questioned wife on what does pt want in regards to goals of care, wife stated \"I don't know. I need to talk to him.\" Informed wife this writer will continue to follow. Please call [phone] if can be of further assistance.         Discharge Codes    No documentation.       Expected Discharge Date and Time     Expected Discharge Date Expected Discharge Time    Dec 12, 2019             Toshia Champion RN    "

## 2019-12-10 NOTE — PROGRESS NOTES
"Palliative Care Progress Note    Date of Admission: 12/8/2019    Subjective:    Seen with wife present in bedside chair.  Patient eating a few bites of lunch but admits he has no appetite and nothing tastes right, just eating and drinking bc he's supposed to.  No nausea at present.  No BM yet this admission.  Says HFNC has helped but still feels SOA.  Reports a little bit of pain in right lateral ribs.       Appropriate today but had a rough night. Got hypoxic, then combative, pulling off oxygen and trying to get OOB.  Placed in restraints transiently.  Patient has no recall of events, just knows he didn't get any restful sleep.    Meds reviewed.  IVF dc'ed.  Has taken PO meds today.    Has gotten PRN haldol 0.5mg x2 yesterday, dilaudid 0.5mg x1 yesterday, ativan 0.5mg x1 last night, and PRN zofran x1 yesterday.  (Some concern that ativan worsened patient's behavior.)         Questions for Current Code Status     Question Answer Comment    Code Status No CPR     Medical Interventions (Level of Support Prior to Arrest) Limited     Limited Support to NOT Include Other     Limited Support to Also NOT Include health care surrogate does not want chest compressions     Level Of Support Discussed With Health Care Surrogate         Scheduled Meds:  guaiFENesin 600 mg Oral Q12H   heparin (porcine) 5,000 Units Subcutaneous Q8H   ipratropium-albuterol 3 mL Nebulization 4x Daily - RT   metoprolol succinate XL 50 mg Oral Daily   polyethylene glycol 17 g Oral BID   saccharomyces boulardii 250 mg Oral BID   sodium chloride 10 mL Intravenous Q12H     Continuous Infusions:   PRN Meds:.haloperidol lactate  •  HYDROmorphone  •  ipratropium-albuterol  •  LORazepam  •  ondansetron **OR** ondansetron  •  sodium chloride  •  sodium chloride    Objective: /56 (BP Location: Left arm, Patient Position: Lying)   Pulse 102   Temp 98.1 °F (36.7 °C) (Oral)   Resp 18   Ht 178 cm (70.08\")   Wt 78.4 kg (172 lb 12.5 oz)   SpO2 97%   BMI " 24.74 kg/m²      Intake/Output Summary (Last 24 hours) at 12/10/2019 1348  Last data filed at 12/10/2019 0424  Gross per 24 hour   Intake --   Output 550 ml   Net -550 ml     Physical Exam:  Gen - elderly, fatigued male in bed, eating a few bites of lunch, dyspneic with speech  Head/Neck - NC/AT, trachea midline, no JVD  Eyes - EOMI, no icterus  ENT - HFNC in place, oral mucosa moist  Heart - RRR, no murmur  Lungs - bilateral rhonchi, bases diminished, breathing labored with speech  Abd - soft, nontender, active BS  Ext - no edema or cyanosis  Skin - pale, dry, cool  Neuro - awake, alert, appropriate, no focal deficit, no myoclonus  Psych - calm, cooperative    Results from last 7 days   Lab Units 12/08/19  1700   WBC 10*3/mm3 7.21   HEMOGLOBIN g/dL 10.9*   HEMATOCRIT % 32.4*   PLATELETS 10*3/mm3 170     Results from last 7 days   Lab Units 12/09/19  0039 12/08/19  1700   SODIUM mmol/L 129* 130*   POTASSIUM mmol/L 3.9 3.7   CHLORIDE mmol/L 98 97*   CO2 mmol/L 22.0 23.0   BUN mg/dL 6* 6*   CREATININE mg/dL 0.64* 0.55*   CALCIUM mg/dL 8.3* 8.8   BILIRUBIN mg/dL  --  0.8   ALK PHOS U/L  --  96   ALT (SGPT) U/L  --  6   AST (SGOT) U/L  --  23   GLUCOSE mg/dL 101* 103*       Impression:  86yo male with metastatic neuroendocrine lung cancer and progressive disease on second line therapy.  Declining overall and admitted with acute respiratory failure.  Looks like he has bilateral pneumonia but more likely lymphangitic spread.  Now on HFNC.    Symptoms:  Dyspnea  Anxiety/Agitation/Restlessness  Debility  Anorexia    Plan:   -Per talk with Dr. Monroy yesterday, plan was to focus on comfort and IVF was dc'ed with comfort meds added.  Oxygen support, however, escalated overnight.  -Not a candidate for any further tx per Dr. Zaragoza, thus he recommended Hospice.  Patient does recall visit with Dr. Zaragoza and his recs but wife was not present.  States Dr. Zaragoza is supposed to visit again today and she and patient do not want to make any  decisions w/o talking to him again.  -I did share that I do Hospice care and am able to answer any questions they may have.  Patient did ask what Hospice care would look like.  I educated that inpatient Hospice care is available at [facility], [place] and that he could get admitted while here as an inpatient.  Our goal would be his comfort rather than treating his cancer, lab abnormalities, or unstable vital signs.  We would wean off his HFNC using comfort medications and that he may die within hours to days of being off the HFNC.  If he were to survive coming off the HFNC support, he could transition to Hospice care at home if his wife is able to care for him.  -Patient and family appreciative of info.  They are aware that Palliative Care team, as well as Hospice team, are available for further needs and questions.      Sandi Valdez MD  12/10/19  1:48 PM

## 2019-12-11 NOTE — NURSING NOTE
Pt's wife (Yamilet) contacted to make aware pt is currently alert, oriented on BIPAP at his own request to try BIPAP for symptom relief following unsuccessful use of hi-flow and non-rebreather mask. Resting following application of BIPAP.

## 2019-12-11 NOTE — DISCHARGE PLACEMENT REQUEST
"Neva Martino (87 y.o. Male)     Date of Birth Social Security Number Address Home Phone MRN    07/03/1932  [address] [address] [phone] [id number]    [employer] Marital Status          None        Admission Date Admission Type Admitting Provider Attending Provider Department, Room/Bed    12/8/19 Emergency Sari Doherty MD Reddy, Mayuri V, MD [address], [address]1    Discharge Date Discharge Disposition Discharge Destination                       Attending Provider:  Sari Doherty MD    Allergies:  Norvasc [Amlodipine Besylate], Other, Hydrocodone    Isolation:  None   Infection:  None   Code Status:  No CPR    Ht:  178 cm (70.08\")   Wt:  77.3 kg (170 lb 6.4 oz)    Admission Cmt:  None   Principal Problem:  Pneumonia [J18.9]                 Active Insurance as of 12/8/2019     Primary Coverage     Payor Plan Insurance Group Employer/Plan Group    MEDICARE MEDICARE A & B      Payor Plan Address Payor Plan Phone Number Payor Plan Fax Number Effective Dates    [address] [phone]  7/1/1997 - None Entered    [facility] [address]       Subscriber Name Subscriber Birth Date Member ID       NEVA MARTINO 7/3/1932 [id number]           Secondary Coverage     Payor Plan Insurance Group Employer/Plan Group    AARP MC SUP AARP HEALTH CARE OPTIONS      Payor Plan Address Payor Plan Phone Number Payor Plan Fax Number Effective Dates    [facility] [phone]  1/1/2016 - None Entered    [address]       [facility] [address]       Subscriber Name Subscriber Birth Date Member ID       NEVA MARTINO 7/3/1932 [id number]           Tertiary Coverage     Payor Plan Insurance Group Employer/Plan Group     FOR LIFE  FOR LIFE MC SUP       Payor Plan Address Payor Plan Phone Number Payor Plan Fax Number Effective Dates    [address] [phone]  9/29/2016 - None Entered    [facility] [address]       Subscriber Name Subscriber Birth Date " Member ID       DARRON MARTINO 7/3/1932 [id number]                 Emergency Contacts      (Rel.) Home Phone Work Phone Mobile Phone    Yamilet Martino (Spouse) [phone] -- --            Emergency Contact Information     Name Relation Home Work Mobile    Yamilet Martino Spouse [phone]            Insurance Information                MEDICARE/MEDICARE A & B Phone: [phone]    Subscriber: Darron Martino Subscriber#: [id number]    Group#:  Precert#:         AARP MC SUP/AARP HEALTH CARE OPTIONS Phone: [phone]    Subscriber: Darron Martino Subscriber#: [id number]    Group#:  Precert#:          FOR LIFE/ FOR LIFE  SUP  Phone: [phone]    Subscriber: Darron Martino Subscriber#: [id number]    Group#:  Precert#:              History & Physical      Bret Giron MD at 19              [facility] Medicine Services  HISTORY AND PHYSICAL    Patient Name: Darron Martino  : 7/3/1932  MRN: [id number]  Primary Care Physician: Duong Greene MD  Date of admission: 2019      Subjective   Subjective     Chief Complaint:  Dyspnea    HPI:  Darron Martino is a 87 y.o. male with progressive SOA for the past week, but markedly worse in the past 2 days prompting his visit to the ED. He has R chest pain, worse with deep breathing. Cough and congestion with yellow sputum. Diaphoresis noted. Nausea and anorexia noted. Intermittent worsening RLQ pain from a prior hernia repair/mess insertion. Otherwise progressively weaker. Denies f/c, just sweats. Anorexia noted. Of noted did have one occurrence of BRBPR that he is sure was from a hemorrhoid.    Review of Systems   Constitutional: Positive for activity change, appetite change, diaphoresis and fatigue. Negative for chills and fever.   HENT: Positive for congestion. Negative for sinus pressure, sinus pain and trouble swallowing.    Respiratory: Positive for cough,  chest tightness and shortness of breath. Negative for choking, wheezing and stridor.    Cardiovascular: Positive for chest pain. Negative for palpitations and leg swelling.   Gastrointestinal: Positive for abdominal distention, abdominal pain, blood in stool and nausea. Negative for vomiting.   Endocrine: Positive for cold intolerance.   Genitourinary: Negative.    Musculoskeletal: Positive for arthralgias, back pain and myalgias.   Skin: Negative.    Neurological: Positive for light-headedness.   Psychiatric/Behavioral: Positive for dysphoric mood.        All other systems reviewed and are negative.     Personal History     Past Medical History:   Diagnosis Date   • Abnormal glucose    • Acquired hyperlipoproteinemia    • Acute bronchitis    • Allergic rhinitis    • Arteriosclerosis of coronary artery    • Cellulitis and abscess    • Cough    • Edema extremities    • Elevated PSA    • Encounter for prostate cancer screening    • Generalized osteoarthrosis, involving multiple sites    • Hypertension    • Lumbosacral spondylosis without myelopathy    • Pain in joint, pelvic region and thigh    • Preventative health care    • Right groin pain    • Transient cerebral ischemia    • Ulnar neuropathy    • Upper back pain on right side    • UTI (urinary tract infection)    • Varicose veins        Past Surgical History:   Procedure Laterality Date   • CARDIAC SURGERY     • INGUINAL HERNIA REPAIR     • LEG SURGERY         Family History: family history includes Arthritis in his father; Hypertension in his mother. Otherwise pertinent FHx was reviewed and unremarkable.     Social History:  reports that he quit smoking about 27 years ago. His smoking use included cigarettes. He has a 96.00 pack-year smoking history. He has never used smokeless tobacco. He reports that he drinks alcohol. He reports that he does not use drugs.  Social History     Social History Narrative   • Not on file       Medications:  Available home  "medication information reviewed.    (Not in a hospital admission)    Allergies   Allergen Reactions   • Norvasc [Amlodipine Besylate] Shortness Of Breath     Severe shortness of breath as soon as he takes it   • Other Shortness Of Breath     Pt reports was started on a medication similar to norvasc but can't remember the name but had same reaction to it as he did the norvasc   • Hydrocodone GI Intolerance     Severe constipation; Originally listed as \"other\" with hydrocodone/apap in the comment field.        Objective   Objective     Vital Signs:   Temp:  [97.6 °F (36.4 °C)] 97.6 °F (36.4 °C)  Heart Rate:  [70-83] 77  Resp:  [20-32] 20  BP: (115-133)/(55-66) 115/55        Physical Exam   Mild respiratory distress, alert and oriented x 3, family at bedside  OP clear, dry MM  PERRL, face symmetric and speech clear  Neck supple, no LAD  RRR  Markedly diminished both bases, with bibasilar rales  +BS, ND, mild TTP RLQ, without guarding or rebound  No c/c/e  No obvious rashes  Dysphoric affect  WORKMAN, but weak in general    Results Reviewed:  I have personally reviewed current lab and radiology data.    Results from last 7 days   Lab Units 12/08/19  1700   WBC 10*3/mm3 7.21   HEMOGLOBIN g/dL 10.9*   HEMATOCRIT % 32.4*   PLATELETS 10*3/mm3 170     Results from last 7 days   Lab Units 12/08/19  1700   SODIUM mmol/L 130*   POTASSIUM mmol/L 3.7   CHLORIDE mmol/L 97*   CO2 mmol/L 23.0   BUN mg/dL 6*   CREATININE mg/dL 0.55*   GLUCOSE mg/dL 103*   CALCIUM mg/dL 8.8   ALT (SGPT) U/L 6   AST (SGOT) U/L 23   TROPONIN T ng/mL <0.010   PROBNP pg/mL 530.2   LACTATE mmol/L 0.9   PROCALCITONIN ng/mL 0.10     Estimated Creatinine Clearance: 69.7 mL/min (A) (by C-G formula based on SCr of 0.55 mg/dL (L)).  Brief Urine Lab Results  (Last result in the past 365 days)      Color   Clarity   Blood   Leuk Est   Nitrite   Protein   CREAT   Urine HCG        08/26/19 1108             50           Imaging Results (Last 24 Hours)     Procedure " Component Value Units Date/Time    XR Chest 1 View [[id number]] Collected:  12/08/19 1728     Updated:  12/08/19 1802    Narrative:          EXAMINATION: XR CHEST 1 VW - 12/08/2019     INDICATION: Shortness of air.     COMPARISON: 11/04/2019     FINDINGS: There is persistent right basilar effusion and either  atelectasis or pneumonia, a little increased. There is also diffusely  increased interstitial disease, perihilar in distribution, which  suggests superimposed interstitial edema. Heart remains mildly enlarged.  No pneumothorax is seen. No left lung effusion or consolidation is seen.            Impression:       1. Persistent right basilar atelectasis or pneumonia, and some effusion,  a little increased from the prior study.  2. Interval development of perihilar interstitial disease, which may be  superimposed mild pulmonary interstitial edema.     DICTATED:   12/08/2019  EDITED/ls :   12/08/2019      This report was finalized on 12/8/2019 5:59 PM by DR. Bret Julien MD.           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] Yes   • Hyponatremia [E87.1] Yes   • Malignant pleural effusion [J91.0] Yes   • CAD s/p MI and CABG in 1993 [I25.10] Yes   • Generalized osteoarthrosis, involving multiple sites [M15.9] Yes   • Hypertension [I10] Yes   • Lumbosacral spondylosis without myelopathy [M47.817] Yes     Neuroendocrine carcinoma with brain mets  --s/p chemotherapy, but on hold pending further input from oncology  --brain mets s/p XRT    Superimposed pneumonia on imaging  --Zosyn/Vanc  --nebs/pulmonary toilet    Episodic hematochezia  --monitor, possibly secondary to hemorrhoid    Hyponatremia  --monitor    HTN  --home meds as tolerated    DVT prophylaxis:  [facility]    CODE STATUS:    Code Status and Medical Interventions:    Ordered at: 12/08/19 1918     Level Of Support Discussed With:    Patient     Code Status:    CPR     Medical Interventions (Level of Support Prior to Arrest):    Full       Admission Status:  I believe this patient meets inpatient criteria given his progression and severity of findings.  Electronically signed by Bret Giron MD, 12/08/19, 7:48 PM.      Electronically signed by Bret Giron MD at 12/08/19 1953

## 2019-12-11 NOTE — PLAN OF CARE
Problem: Patient Care Overview  Goal: Interprofessional Rounds/Family Conf  Note:   Patient declined over night. He is actively dying. He is not awake, not eating or drinking, he is SOB, restless and agitated. I spoke with wife, Yamilet when she arrived at bedside. I reported what Dr. Zaragoza had written about recommending Hospice. I also informed her that Dr. Zaragoza welcomed her to come over to the clinic today and he will talk with her. Hospice notified of wife's request to admit to in-patient Hospice. I offered her a , she declined but will ask nurse if she wants one to visit. She understands her  is dying and wants to make him comfortable. She is requesting to please allow him to stay in room and not move him. Patient was discharged and admitted to in-patient hospice at 1308.

## 2019-12-11 NOTE — PROGRESS NOTES
[facility] Medicine Services  PROGRESS NOTE    Patient Name: Darron Martino  : 7/3/1932  MRN: [id number]    Date of Admission: 2019  Primary Care Physician: Duong Greene MD    Subjective   Subjective     CC:  F/U dyspnea    HPI:  Patient declined overnight. Desturated on high flow nasal cannula. This morning he was initially agreeable to BiPAP trial due to smothering, however now refusing it again. States he is dying.    Review of Systems  Denies pain  +dyspnea    Objective   Objective     Vital Signs:   Temp:  [98.1 °F (36.7 °C)-98.5 °F (36.9 °C)] 98.1 °F (36.7 °C)  Heart Rate:  [] 119  Resp:  [20-26] 20  BP: (123-153)/(69-94) 153/94        Physical Exam:  Gen-moderate respiratory distress  HENT-NCAT, mucous membranes moist  CV-RRR, S1 S2 normal, no m/r/g  Resp-coarse breath sounds bilaterally, moderate respiratory distress, NRB in place, O2 sats in the low 80's  Abd-soft, NT, ND, +BS  Ext-no edema  Neuro-A&Ox3, no focal deficits  Skin-no rashes  Psych-appropriate mood      Results Reviewed:    Results from last 7 days   Lab Units 19  1700   WBC 10*3/mm3 7.21   HEMOGLOBIN g/dL 10.9*   HEMATOCRIT % 32.4*   PLATELETS 10*3/mm3 170   PROCALCITONIN ng/mL 0.10     Results from last 7 days   Lab Units 19  0039 19  1700   SODIUM mmol/L 129* 130*   POTASSIUM mmol/L 3.9 3.7   CHLORIDE mmol/L 98 97*   CO2 mmol/L 22.0 23.0   BUN mg/dL 6* 6*   CREATININE mg/dL 0.64* 0.55*   GLUCOSE mg/dL 101* 103*   CALCIUM mg/dL 8.3* 8.8   ALT (SGPT) U/L  --  6   AST (SGOT) U/L  --  23   TROPONIN T ng/mL  --  <0.010   PROBNP pg/mL 624.3 530.2     Estimated Creatinine Clearance: 71.1 mL/min (A) (by C-G formula based on SCr of 0.64 mg/dL (L)).    Microbiology Results Abnormal     Procedure Component Value - Date/Time    Blood Culture - Blood, Arm, Right [[id number]] Collected:  19 1700    Lab Status:  Preliminary result Specimen:  Blood from Arm, Right Updated:   12/10/19 1830     Blood Culture No growth at 2 days    Blood Culture - Blood, Arm, Right [[id number]] Collected:  12/08/19 1710    Lab Status:  Preliminary result Specimen:  Blood from Arm, Right Updated:  12/10/19 1830     Blood Culture No growth at 2 days    MRSA Screen, PCR - Swab, Nares [[id number]]  (Normal) Collected:  12/09/19 0054    Lab Status:  Final result Specimen:  Swab from Nares Updated:  12/09/19 1017     MRSA PCR Negative    Narrative:       MRSA Negative          Imaging Results (Last 24 Hours)     ** No results found for the last 24 hours. **          Results for orders placed during the hospital encounter of 12/08/19   Adult Transthoracic Echo Complete W/ Cont if Necessary Per Protocol    Narrative · Left ventricular systolic function is normal.  · EF appears to be in the range of 61 - 65%  · There is distal inferior and septal hypokinesis. Septal motion   consistent with postoperative state  · Left ventricular diastolic dysfunction is noted (grade I) consistent   with impaired relaxation.  · Mild aortic stenosis. Mean gradient 11 mmHg  · Mild mitral valve regurgitation is present          I have reviewed the medications:  Scheduled Meds:  guaiFENesin 600 mg Oral Q12H   heparin (porcine) 5,000 Units Subcutaneous Q8H   ipratropium-albuterol 3 mL Nebulization 4x Daily - RT   metoprolol succinate XL 50 mg Oral Daily   polyethylene glycol 17 g Oral BID   saccharomyces boulardii 250 mg Oral BID   sodium chloride 10 mL Intravenous Q12H     Continuous Infusions:   PRN Meds:.haloperidol lactate  •  HYDROmorphone  •  ipratropium-albuterol  •  LORazepam  •  ondansetron **OR** ondansetron  •  sodium chloride  •  sodium chloride      Assessment/Plan   Assessment / Plan     Active Hospital Problems    Diagnosis  POA   • **Pneumonia [J18.9]  Yes   • 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        Brief Hospital Course to date:  Darron Martino is a 87 y.o. 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 Imaging showed possible superimposed pneumonia in the RLL as well as new interstitial disease bilaterally, along with progression of mediastinal adenopathy. Dr. Zaragoza has seen the patient and feels this is progression of disease and likely lymphangitic spread. He is not a candidate for further treatment. Palliative and Hospice have been consulted.     PLAN:  --Patient continues to decline. Refuses BiPAP. Will focus on comfort per his request. Unable to reach wife at this time.  has been called. Comfort meds in place.     DVT Prophylaxis:  [facility]    Disposition: I expect the patient to be discharged TBD, appears to be actively dying, inpatient hospice appropriate     CODE STATUS:   Code Status and Medical Interventions:   Ordered at: 12/09/19 5470     Limited Support to NOT Include:    Other     Level Of Support Discussed With:    Health Care Surrogate     Code Status:    No CPR     Medical Interventions (Level of Support Prior to Arrest):    Limited     Limited Support to Also NOT Include:    health care surrogate does not want chest compressions         Electronically signed by Sari Dohrety MD, 12/11/19, 8:55 AM.

## 2019-12-11 NOTE — PROGRESS NOTES
"Continued Stay Note  [facility]     Patient Name: Darron Martino  MRN: [id number]  Today's Date: 12/11/2019    Admit Date: 12/8/2019    Discharge Plan     Row Name 12/11/19 1123       Plan    Plan  Undetermined    Plan Comments  Alejandra Dick RN Hospice Liaison with [facility] visited with pt and wife, Yamilet this morning.  Yamilet has declined transferring the patient to the [facility] and prefers to maintain the patient at [facility] inpatient hospice for symptom management of dyspnea. This RN will alert the inpatient team.  If may be of further assistance, please call [phone].    Row Name 12/11/19 6192       Plan    Plan  Undetermined    Plan Comments  This RN received call from Misti Good explaining that the patient has experienced a rapid decline and is now on a NRB.  Visit made.  Numerous nurses in the room providing care.  Wife, Yamilet arrived shortly after this RN's arrival.  Discussed with her that the patient has experienced a rapid decline.  The nurses explained to her that the patient, while alert and oriented, expressed that he does not wish to be intubated, that he recognizes that his time is near, and that he wishes to avoid any further life saving measures, including bipap.  Yamilet expressed that she has \"Legal power of .\"  It was explained that this is only in effect in the event the patient is unable to make decisions for himself.  Yamilet expresses that she wishes to speak with him.  However, he is increasingly drowsy at this time as the nurses had administered comfort medications prior to her arrival.  This RN asked her pointedly if he were to continue to decline would she wish for aggressive treatment and she stated, \"if that happens, then we'll just let the man above made that decision.\"  This RN explained that in the event he is actively or imminently dying, it would not be reasonable to attempt to transfer him to the [facility] in which she " verbalized understanding and stated that she would prefer to maintain him here in inpatient hospice care.  She plans to attempt to speak with him and will notify Bigg RN of their wishes.  This RN will await Bigg's report and if indicated, will refer to inpatient hospice for possible admission.  If may be of further assistance, please call [phone].        Discharge Codes    No documentation.       Expected Discharge Date and Time     Expected Discharge Date Expected Discharge Time    Dec 12, 2019             Aurora Rendon RN

## 2019-12-11 NOTE — PLAN OF CARE
I spoke with wife, Yamilet and explained that she missed Dr. Zaragoza and he recommends Hospice. Wife agrees with comfort care and to admit to in-patient Hospice. Dr. Monroy, nurse, Dr. Doherty and Shannan, Hospice Nurse informed of her decision. She request to stay on 5G.  Problem: Dying Patient, Actively (Adult)  Goal: Identify Related Risk Factors and Signs and Symptoms  Outcome: Ongoing (interventions implemented as appropriate)  Flowsheets (Taken 12/11/2019 1200)  Related Risk Factors (Actively Dying Patient): dyspnea

## 2019-12-11 NOTE — PLAN OF CARE
Patient having frequent bouts of labored breathing and restlessness, medication changes made today and patient transferred to inpatient hospice. Medicated as ordered with effectiveness keeping comfort measures in mind. Pt resting comfortably at this time, will continue to monitor.   Problem: Dying Patient, Actively (Adult)  Goal: Identify Related Risk Factors and Signs and Symptoms  Outcome: Ongoing (interventions implemented as appropriate)  Flowsheets (Taken 12/11/2019 1822)  Related Risk Factors (Actively Dying Patient): dyspnea; intractable pain; level of consciousness; delirium; disease progression  Signs and Symptoms (Actively Dying Patient): agitation/restlessness; frequently disoriented; vital sign changes; skin color changes; dyspnea     Problem: Dying Patient, Actively (Adult)  Goal: Comfort/Pain Control  Outcome: Ongoing (interventions implemented as appropriate)  Flowsheets (Taken 12/11/2019 1822)  Comfort/Pain Control: making progress toward outcome     Problem: Dying Patient, Actively (Adult)  Goal: Peace/Preservation of Dignity During the Dying Process  Outcome: Ongoing (interventions implemented as appropriate)  Flowsheets (Taken 12/11/2019 1822)  Peace/Preservation of Dignity During the Dying Process: making progress toward outcome     Problem: Dying Patient, Actively (Adult)  Intervention: Promote Gastrointestinal Comfort/Function  Flowsheets (Taken 12/11/2019 1822)  Bowel Dysfunction Management: toileting offered  Diarrhea Management: other (see comments)  Urinary Elimination Promotion: absorbent pad/diaper use encouraged     Problem: Dying Patient, Actively (Adult)  Intervention: Promote Peace/Preserve Personal Dignity  Flowsheets (Taken 12/11/2019 1822)  Supportive Measures: active listening utilized; verbalization of feelings encouraged; relaxation techniques promoted  Complementary Therapy: music therapy  Spiritual Activities Assistance: spiritual support provided

## 2019-12-11 NOTE — PROGRESS NOTES
"Palliative Care Progress Note    Date of Admission: 12/8/2019    Subjective:  Patient minimally arousable.    Current Code Status     Date Active Code Status Order ID Comments User Context       12/9/2019 2327 No CPR [id number]  Patricia Tracy APRN Inpatient       Questions for Current Code Status     Question Answer Comment    Code Status No CPR     Medical Interventions (Level of Support Prior to Arrest) Limited     Limited Support to NOT Include Other     Limited Support to Also NOT Include health care surrogate does not want chest compressions     Level Of Support Discussed With Health Care Surrogate         No current facility-administered medications on file prior to encounter.      Current Outpatient Medications on File Prior to Encounter   Medication Sig Dispense Refill   • metoprolol succinate XL (TOPROL XL) 50 MG 24 hr tablet Take 50 mg by mouth Daily.          •  glycopyrrolate  •  haloperidol lactate  •  HYDROmorphone  •  ipratropium-albuterol  •  LORazepam  •  Morphine  •  ondansetron **OR** ondansetron  •  sodium chloride  •  sodium chloride    Objective: /94 (BP Location: Left arm, Patient Position: Lying)   Pulse 119   Temp 98.1 °F (36.7 °C) (Oral)   Resp 20   Ht 178 cm (70.08\")   Wt 77.3 kg (170 lb 6.4 oz)   SpO2 (!) 81%   BMI 24.39 kg/m²      Intake/Output Summary (Last 24 hours) at 12/11/2019 1016  Last data filed at 12/11/2019 0520  Gross per 24 hour   Intake --   Output 325 ml   Net -325 ml     Physical Exam:      General Appearance:    Labored breathing noted   Head:    Normocephalic, without obvious abnormality, atraumatic   Eyes:            Lids and lashes normal, conjunctivae and sclerae normal, no   icterus, no pallor, corneas clear, PERRLA   Ears:    Ears appear intact with no abnormalities noted   Throat:   No oral lesions, no thrush, oral mucosa moist   Neck:   No adenopathy, supple, trachea midline, no thyromegaly, no   carotid bruit, no JVD   Back:     No kyphosis " present, no scoliosis present, no skin lesions,      erythema or scars, no tenderness to percussion or                   palpation,   range of motion normal   Lungs:     Upper airway congestion noted    Heart:    Regular rhythm and normal rate, normal S1 and S2, no            murmur, no gallop, no rub, no click   Chest Wall:    No abnormalities observed   Abdomen:     Normal bowel sounds, no masses, no organomegaly, soft        non-tender, non-distended, no guarding, no rebound                tenderness   Rectal:     Deferred   Extremities:   Moves all extremities well, no edema, no cyanosis, no             redness   Pulses:   Pulses palpable and equal bilaterally   Skin:   No bleeding, bruising or rash   Lymph nodes:   No palpable adenopathy   Neurologic:   Cranial nerves 2 - 12 grossly intact, sensation intact, DTR       present and equal bilaterally     Results from last 7 days   Lab Units 12/08/19  1700   WBC 10*3/mm3 7.21   HEMOGLOBIN g/dL 10.9*   HEMATOCRIT % 32.4*   PLATELETS 10*3/mm3 170     Results from last 7 days   Lab Units 12/09/19  0039 12/08/19  1700   SODIUM mmol/L 129* 130*   POTASSIUM mmol/L 3.9 3.7   CHLORIDE mmol/L 98 97*   CO2 mmol/L 22.0 23.0   BUN mg/dL 6* 6*   CREATININE mg/dL 0.64* 0.55*   CALCIUM mg/dL 8.3* 8.8   BILIRUBIN mg/dL  --  0.8   ALK PHOS U/L  --  96   ALT (SGPT) U/L  --  6   AST (SGOT) U/L  --  23   GLUCOSE mg/dL 101* 103*       Impression: Neuroendocrine tumor  Restlessness  N/V  GOC  Plan: Talked with wife.  She is interezsted in inpt hospice.  Will adjust meds for comfort.          Alexander Monroy,   12/11/19  10:16 AM

## 2019-12-11 NOTE — DISCHARGE SUMMARY
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          Hospital Course:  Darron Martino is a 87 y.o. 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 Imaging showed possible superimposed pneumonia in the RLL as well as new interstitial disease bilaterally, along with progression of mediastinal adenopathy. Dr. Zaragoza has seen the patient and feels this is progression of disease and likely lymphangitic spread. He is not a candidate for further treatment. Palliative and Hospice have been consulted.     Patient continues to decline, refuses BiPAP, states he is ready to die. Focusing on comfort per his request. He will be transitioned to inpatient hospice today.    Day of Discharge     HPI:   See progress note same date    Vital Signs:   Temp:  [98.1 °F (36.7 °C)-98.5 °F (36.9 °C)] 98.1 °F (36.7 °C)  Heart Rate:  [] 105  Resp:  [20-26] 20  BP: (123-153)/(69-94) 153/94     Physical Exam:  See progress  note same date    Most Recent Results         Lab Results   Component Value Date    WBC 7.21 12/08/2019    HGB 10.9 (L) 12/08/2019    HCT 32.4 (L) 12/08/2019    MCV 89.8 12/08/2019     12/08/2019       Brief Urine Lab Results  (Last result in the past 365 days)      Color   Clarity   Blood   Leuk Est   Nitrite   Protein   CREAT   Urine HCG        08/26/19 1108             50             Microbiology Results Abnormal     Procedure Component Value - Date/Time    Blood Culture - Blood, Arm, Right [[id number]] Collected:  12/08/19 1700    Lab Status:  Preliminary result Specimen:  Blood from Arm, Right Updated:  12/10/19 1830     Blood Culture No growth at 2 days    Blood Culture - Blood, Arm, Right [[id number]] Collected:  12/08/19 1710    Lab Status:  Preliminary result Specimen:  Blood from Arm, Right Updated:  12/10/19 1830     Blood Culture No growth at 2 days    MRSA Screen, PCR - Swab, Nares [[id number]]  (Normal) Collected:  12/09/19 0054    Lab Status:  Final result Specimen:  Swab from Nares Updated:  12/09/19 1017     MRSA PCR Negative    Narrative:       MRSA Negative          Imaging Results (Last 72 Hours)     Procedure Component Value Units Date/Time    XR Chest 1 View [[id number]] Collected:  12/08/19 1728     Updated:  12/08/19 1802    Narrative:          EXAMINATION: XR CHEST 1 VW - 12/08/2019     INDICATION: Shortness of air.     COMPARISON: 11/04/2019     FINDINGS: There is persistent right basilar effusion and either  atelectasis or pneumonia, a little increased. There is also diffusely  increased interstitial disease, perihilar in distribution, which  suggests superimposed interstitial edema. Heart remains mildly enlarged.  No pneumothorax is seen. No left lung effusion or consolidation is seen.            Impression:       1. Persistent right basilar atelectasis or pneumonia, and some effusion,  a little increased from the prior study.  2. Interval development of perihilar interstitial disease,  which may be  superimposed mild pulmonary interstitial edema.     DICTATED:   12/08/2019  EDITED/ls :   12/08/2019      This report was finalized on 12/8/2019 5:59 PM by DR. Bret Julien MD.               Discharge Details       Discharge Disposition:  Transfer care to inpatient Hospice at [facility]      Electronically signed by Sari Doherty MD, 12/11/19, 2:18 PM

## 2019-12-11 NOTE — H&P
Hospice History and Physical     Patient Name:  Darron Martino   : 7/3/1932   Sex: male    Patient Care Team:  Duong Greene MD as PCP - General  Duong Greene MD as PCP - Family Medicine  Duong Greene MD as PCP - Claims Attributed  Ninfa Vale MD as Referring Physician (Medical Oncology)  Jose David Guzman MD as Consulting Physician (Radiation Oncology)    Code Status: Comfort Measures    Subjective     Patient is a 87 y.o. male admitted 19 for pna, Neuroendocrine carcinoma with brain mets, hematochezia, and hyponatremia. He presented to the ED c/o shortness of breath, cough, and weakness.    Imaging showed possible superimposed pneumonia in the RLL as well as new interstitial disease bilaterally, along with progression of mediastinal adenopathy. Dr. Zaragoza has seen the patient and per notes, feels this is progression of disease and likely lymphangitic spread. He is not a candidate for further treatment. Palliative and Hospice consulted.      Ultimately appreciating pt's wishes, wife elected for Comfort Measures. Pt was admitted to in Hospice on 19 for mgmt of acute symptoms 2/2 neuroendocrine tumor.       Review of Systems  Review of Systems   Unable to perform ROS: Acuity of condition       History  Past Medical History:   Diagnosis Date   • Abnormal glucose    • Acquired hyperlipoproteinemia    • Acute bronchitis    • Allergic rhinitis    • Arteriosclerosis of coronary artery    • Cellulitis and abscess    • Cough    • Edema extremities    • Elevated PSA    • Encounter for prostate cancer screening    • Generalized osteoarthrosis, involving multiple sites    • Hypertension    • Lumbosacral spondylosis without myelopathy    • Pain in joint, pelvic region and thigh    • Preventative health care    • Right groin pain    • Transient cerebral ischemia    • Ulnar neuropathy    • Upper back pain on right side    • UTI (urinary tract  "infection)    • Varicose veins      Past Surgical History:   Procedure Laterality Date   • CARDIAC SURGERY     • INGUINAL HERNIA REPAIR     • LEG SURGERY       No current facility-administered medications for this encounter.        No current facility-administered medications for this encounter.     Allergies   Allergen Reactions   • Norvasc [Amlodipine Besylate] Shortness Of Breath     Severe shortness of breath as soon as he takes it   • Other Shortness Of Breath     Pt reports was started on a medication similar to norvasc but can't remember the name but had same reaction to it as he did the norvasc   • Hydrocodone GI Intolerance     Severe constipation; Originally listed as \"other\" with hydrocodone/apap in the comment field.      Family History   Problem Relation Age of Onset   • Hypertension Mother    • Arthritis Father      Social History     Socioeconomic History   • Marital status:      Spouse name: Not on file   • Number of children: Not on file   • Years of education: Not on file   • Highest education level: Not on file   Tobacco Use   • Smoking status: Former Smoker     Packs/day: 2.00     Years: 48.00     Pack years: 96.00     Types: Cigarettes     Last attempt to quit:      Years since quittin.9   • Smokeless tobacco: Never Used   Substance and Sexual Activity   • Alcohol use: Yes     Comment: occasional   • Drug use: No   • Sexual activity: Never       Objective     Vital Signs  Temp:  [98.1 °F (36.7 °C)-98.5 °F (36.9 °C)] 98.1 °F (36.7 °C)  Heart Rate:  [] 105  Resp:  [20-26] 20  BP: (123-153)/(69-94) 153/94    PPS: Palliative Performance Scale score as of 2019, 10:02 AM is 20% based on the following measures:   Ambulation: Totally bed bound  Activity and Evidence of Disease: Unable to do any work, extensive evidence of disease  Self-Care: Total care  Intake:Minimal sips  LOC: Full, drowsy or confusion      Physical Exam:  Physical Exam   Constitutional: He appears ill. "   HENT:   Head: Atraumatic.   Eyes:   closed   Neck: Tracheal deviation present.   Cardiovascular: Normal rate and regular rhythm.   Pulmonary/Chest:   NRB 10L  CTA   Abdominal: Soft. Bowel sounds are normal.   Musculoskeletal: He exhibits no edema.   Neurological:   Does not follow commands   Skin: Skin is dry. He is not diaphoretic. There is pallor.   Psychiatric:   No facial grimacing; restless at end of visit       Results Review:   Lab Results   Component Value Date    HGBA1C 5.0 08/26/2019       Lab Results   Component Value Date    GLUCOSE 101 (H) 12/09/2019    BUN 6 (L) 12/09/2019    CREATININE 0.64 (L) 12/09/2019    EGFRIFNONA 118 12/09/2019    BCR 9.4 12/09/2019    K 3.9 12/09/2019    CO2 22.0 12/09/2019    CALCIUM 8.3 (L) 12/09/2019    ALBUMIN 3.10 (L) 12/08/2019    AST 23 12/08/2019    ALT 6 12/08/2019       WBC   Date Value Ref Range Status   12/08/2019 7.21 3.40 - 10.80 10*3/mm3 Final     RBC   Date Value Ref Range Status   12/08/2019 3.61 (L) 4.14 - 5.80 10*6/mm3 Final     Hemoglobin   Date Value Ref Range Status   12/08/2019 10.9 (L) 13.0 - 17.7 g/dL Final     Hematocrit   Date Value Ref Range Status   12/08/2019 32.4 (L) 37.5 - 51.0 % Final     MCV   Date Value Ref Range Status   12/08/2019 89.8 79.0 - 97.0 fL Final     MCH   Date Value Ref Range Status   12/08/2019 30.2 26.6 - 33.0 pg Final     MCHC   Date Value Ref Range Status   12/08/2019 33.6 31.5 - 35.7 g/dL Final     RDW   Date Value Ref Range Status   12/08/2019 14.9 12.3 - 15.4 % Final     RDW-SD   Date Value Ref Range Status   12/08/2019 48.4 37.0 - 54.0 fl Final     MPV   Date Value Ref Range Status   12/08/2019 8.4 6.0 - 12.0 fL Final     Platelets   Date Value Ref Range Status   12/08/2019 170 140 - 450 10*3/mm3 Final     Neutrophil %   Date Value Ref Range Status   12/08/2019 64.5 42.7 - 76.0 % Final     Lymphocyte %   Date Value Ref Range Status   12/08/2019 16.2 (L) 19.6 - 45.3 % Final     Monocyte %   Date Value Ref Range Status    12/08/2019 16.1 (H) 5.0 - 12.0 % Final     Eosinophil %   Date Value Ref Range Status   12/08/2019 1.2 0.3 - 6.2 % Final     Basophil %   Date Value Ref Range Status   12/08/2019 1.0 0.0 - 1.5 % Final     Immature Grans %   Date Value Ref Range Status   12/08/2019 1.0 (H) 0.0 - 0.5 % Final     Neutrophils, Absolute   Date Value Ref Range Status   12/08/2019 4.65 1.70 - 7.00 10*3/mm3 Final     Lymphocytes, Absolute   Date Value Ref Range Status   12/08/2019 1.17 0.70 - 3.10 10*3/mm3 Final     Monocytes, Absolute   Date Value Ref Range Status   12/08/2019 1.16 (H) 0.10 - 0.90 10*3/mm3 Final     Eosinophils, Absolute   Date Value Ref Range Status   12/08/2019 0.09 0.00 - 0.40 10*3/mm3 Final     Basophils, Absolute   Date Value Ref Range Status   12/08/2019 0.07 0.00 - 0.20 10*3/mm3 Final     Immature Grans, Absolute   Date Value Ref Range Status   12/08/2019 0.07 (H) 0.00 - 0.05 10*3/mm3 Final     nRBC   Date Value Ref Range Status   12/08/2019 0.0 0.0 - 0.2 /100 WBC Final         Neuroendocrine carcinoma of lung (CMS/HCC)      Assessment/Plan   Assessment/Plan:     87yoM admitted to in Hospice for neuroendocriune tumor.     - multiple medication changes for comfort - times offset for now to provide overall coverage  *opioid  *haldol  *benzo  *bowel regimen  *duoneb vs robinul    - prns available for comfort - use to dose find    - transfer order placed    Coordinated care with Nursing.    Time spent at bedside with pt/wife. Pt comfortable through most of visit (prns effective) but started to become agitated/restless by end of visit. Support provided to wife. Hospice contact information shared.     Total Visit Time: 75min  Face to Face Time: 45min    Justification for care:  Patient meets criteria for acute in-patient care with required nursing assessment and interventions for symptoms with IV medications.      Cornelia Reyes, APRN  (C) [phone]  (O) [phone]  12/11/19  1:17 PM

## 2019-12-11 NOTE — PROGRESS NOTES
"Continued Stay Note  [facility]     Patient Name: Darron Martino  MRN: [id number]  Today's Date: 12/11/2019    Admit Date: 12/8/2019    Discharge Plan     Row Name 12/11/19 0936       Plan    Plan  Undetermined    Plan Comments  This RN received call from Misti Good explaining that the patient has experienced a rapid decline and is now on a NRB.  Visit made.  Numerous nurses in the room providing care.  Wife, Yamilet arrived shortly after this RN's arrival.  Discussed with her that the patient has experienced a rapid decline.  The nurses explained to her that the patient, while alert and oriented, expressed that he does not wish to be intubated, that he recognizes that his time is near, and that he wishes to avoid any further life saving measures, including bipap.  Yamilet expressed that she has \"Legal power of .\" She expressed that she has the document with her and will provide this for the chart.  This RN explained there is a difference in Medical and Legal POA of which she verbalized understanding stating, \"I understand, I've worked in healthcare many years.\"  It was explained that a Medical POA is only in effect in the event the patient is unable to make decisions for himself.  Yamilet expresses understanding, but has concern that with his recent confusion, he may not be making good decisions and that she wishes to speak with him.  However, he is increasingly drowsy at this time as the nurses had administered comfort medications prior to her arrival.  This RN asked her pointedly if he were to continue to decline would she wish for aggressive treatment and she stated, \"if that happens, then we'll just let the man above made that decision.\"  This RN explained that in the event he is actively or imminently dying, it would not be reasonable to attempt to transfer him to the [facility] in which she verbalized understanding and stated that she would prefer to maintain him here in inpatient " hospice care.  She plans to attempt to speak with him and will notify Bigg RN of their wishes.  This RN will await Bigg's report and if indicated, will refer to inpatient hospice for possible admission.  If may be of further assistance, please call [phone].        Discharge Codes    No documentation.       Expected Discharge Date and Time     Expected Discharge Date Expected Discharge Time    Dec 12, 2019             Aurora Rendon RN

## 2019-12-11 NOTE — PROGRESS NOTES
"S: Overnight was exceedingly confused.  Now is under restraints requiring BiPAP.        Past medical history, social history and family history was reviewed and unchanged from prior visit.    Review of Systems:    Review of Systems   Unable to perform ROS: Mental status change      A comprehensive 14 point review of systems was performed and was negative except as mentioned.      Medications:  The current medication list was reviewed in the EMR    ALLERGIES:    Allergies   Allergen Reactions   • Norvasc [Amlodipine Besylate] Shortness Of Breath     Severe shortness of breath as soon as he takes it   • Other Shortness Of Breath     Pt reports was started on a medication similar to norvasc but can't remember the name but had same reaction to it as he did the norvasc   • Hydrocodone GI Intolerance     Severe constipation; Originally listed as \"other\" with hydrocodone/apap in the comment field.          Physical Exam    VITAL SIGNS:  /94 (BP Location: Left arm, Patient Position: Lying)   Pulse 104   Temp 98.1 °F (36.7 °C) (Oral)   Resp 20   Ht 178 cm (70.08\")   Wt 77.3 kg (170 lb 6.4 oz)   SpO2 (!) 88%   BMI 24.39 kg/m²   Temp:  [98.1 °F (36.7 °C)-98.5 °F (36.9 °C)] 98.1 °F (36.7 °C)      Performance Status: 4    Physical Exam    General:frail in respiratory discomfort  HEENT: sclerae anicteric, oropharynx clear, neck is supple  Lymphatics: no cervical, supraclavicular, or axillary adenopathy  Cardiovascular: regular rate and rhythm, no murmurs, rubs or gallops  Lungs: bilateral rhonchi  Abdomen: soft, nontender, nondistended.  No palpable organomegaly  Extremities: no lower extremity edema  Skin: no rashes, lesions, bruising, or petechiae  Msk: weak  Psych: Confused and agitated        RECENT LABS:    Lab Results   Component Value Date    HGB 10.9 (L) 12/08/2019    HCT 32.4 (L) 12/08/2019    MCV 89.8 12/08/2019     12/08/2019    WBC 7.21 12/08/2019    NEUTROABS 4.65 12/08/2019    LYMPHSABS 1.17 " 12/08/2019    MONOSABS 1.16 (H) 12/08/2019    EOSABS 0.09 12/08/2019    BASOSABS 0.07 12/08/2019       Lab Results   Component Value Date    GLUCOSE 101 (H) 12/09/2019    BUN 6 (L) 12/09/2019    CREATININE 0.64 (L) 12/09/2019     (L) 12/09/2019    K 3.9 12/09/2019    CL 98 12/09/2019    CO2 22.0 12/09/2019    CALCIUM 8.3 (L) 12/09/2019    PROTEINTOT 5.8 (L) 12/08/2019    ALBUMIN 3.10 (L) 12/08/2019    BILITOT 0.8 12/08/2019    ALKPHOS 96 12/08/2019    AST 23 12/08/2019    ALT 6 12/08/2019         Assessment/Plan    1.  Metastatic neuroendocrine carcinoma of the lung.  Patient's progressing on second line therapy.  Wife not at bedside.  Again I feel that hospice is the most appropriate avenue to pursue at this time.  Patient is a little confused today.  So cannot have a good discussion with him.  I spoke to the nurse and let her know that the wife can come to my clinic this morning and I can discuss his care during the day when she is here.            Ninfa Vale MD  [facility] Hematology and Oncology    12/11/2019

## 2019-12-11 NOTE — SIGNIFICANT NOTE
RN called to report patient desaturating on 100% high-flow with worsening lung sounds. Examined and spoke with the patient about BiPAP for symptomatic relief and the patient requested a trial.

## 2019-12-11 NOTE — PROGRESS NOTES
[facility] Medicine [facility]  PROGRESS NOTE    Patient Name: Darron Martino  : 7/3/1932  MRN: [id number]    Date of Admission: 2019  Primary Care Physician: Duong Greene MD    Subjective   Subjective     CC: Progressive respiratory failure    HPI: Patient had respiratory event overnight.  Was seen by hospice during the day.  It sounds like he was placed on high flow nasal cannula.  Hospice working on plan of care.  No real complaints from patient today.  No family in room    Review of Systems    Gen- No fevers, chills  CV- No chest pain, palpitations  Resp- No cough, dyspnea  GI- No N/V/D, abd pain    Objective   Objective     Vital Signs:   Temp:  [97.7 °F (36.5 °C)-98.5 °F (36.9 °C)] 98.5 °F (36.9 °C)  Heart Rate:  [] 104  Resp:  [18-26] 22  BP: (101-123)/(56-69) 123/69        Physical Exam:    Constitutional: No acute distress, awake, alert  HENT: NCAT, mucous membranes moist  Respiratory: Poor inspiratory effort, diffuse rhonchi and crackles  Cardiovascular: Tachycardic S1-S2  Gastrointestinal: Positive bowel sounds, soft, nontender, nondistended  Musculoskeletal: No bilateral ankle edema  Psychiatric: Appropriate affect, cooperative  Neurologic: Oriented x 3, strength symmetric in all extremities, Cranial Nerves grossly intact to confrontation, speech clear  Skin: No rashes    Results Reviewed:    Results from last 7 days   Lab Units 19  1700   WBC 10*3/mm3 7.21   HEMOGLOBIN g/dL 10.9*   HEMATOCRIT % 32.4*   PLATELETS 10*3/mm3 170   PROCALCITONIN ng/mL 0.10     Results from last 7 days   Lab Units 19  0039 19  1700   SODIUM mmol/L 129* 130*   POTASSIUM mmol/L 3.9 3.7   CHLORIDE mmol/L 98 97*   CO2 mmol/L 22.0 23.0   BUN mg/dL 6* 6*   CREATININE mg/dL 0.64* 0.55*   GLUCOSE mg/dL 101* 103*   CALCIUM mg/dL 8.3* 8.8   ALT (SGPT) U/L  --  6   AST (SGOT) U/L  --  23   TROPONIN T ng/mL  --  <0.010   PROBNP pg/mL 624.3 530.2     Estimated  Creatinine Clearance: 72.1 mL/min (A) (by C-G formula based on SCr of 0.64 mg/dL (L)).    Microbiology Results Abnormal     Procedure Component Value - Date/Time    Blood Culture - Blood, Arm, Right [[id number]] Collected:  12/08/19 1700    Lab Status:  Preliminary result Specimen:  Blood from Arm, Right Updated:  12/10/19 1830     Blood Culture No growth at 2 days    Blood Culture - Blood, Arm, Right [[id number]] Collected:  12/08/19 1710    Lab Status:  Preliminary result Specimen:  Blood from Arm, Right Updated:  12/10/19 1830     Blood Culture No growth at 2 days    MRSA Screen, PCR - Swab, Nares [[id number]]  (Normal) Collected:  12/09/19 0054    Lab Status:  Final result Specimen:  Swab from Nares Updated:  12/09/19 1017     MRSA PCR Negative    Narrative:       MRSA Negative          Imaging Results (Last 24 Hours)     ** No results found for the last 24 hours. **          Results for orders placed during the hospital encounter of 12/08/19   Adult Transthoracic Echo Complete W/ Cont if Necessary Per Protocol    Narrative · Left ventricular systolic function is normal.  · EF appears to be in the range of 61 - 65%  · There is distal inferior and septal hypokinesis. Septal motion   consistent with postoperative state  · Left ventricular diastolic dysfunction is noted (grade I) consistent   with impaired relaxation.  · Mild aortic stenosis. Mean gradient 11 mmHg  · Mild mitral valve regurgitation is present          I have reviewed the medications:  Scheduled Meds:  guaiFENesin 600 mg Oral Q12H   heparin (porcine) 5,000 Units Subcutaneous Q8H   ipratropium-albuterol 3 mL Nebulization 4x Daily - RT   metoprolol succinate XL 50 mg Oral Daily   polyethylene glycol 17 g Oral BID   saccharomyces boulardii 250 mg Oral BID   sodium chloride 10 mL Intravenous Q12H     Continuous Infusions:   PRN Meds:.haloperidol lactate  •  HYDROmorphone  •  ipratropium-albuterol  •  LORazepam  •  ondansetron **OR** ondansetron  •  sodium  chloride  •  sodium chloride      Assessment/Plan   Assessment / Plan     Active Hospital Problems    Diagnosis  POA   • **Pneumonia [J18.9]  Yes   • Neuroendocrine carcinoma of lung (CMS/HCC) [C7A.8]  Yes   • Hyponatremia [E87.1]  Yes   • Malignant pleural effusion [J91.0]  Yes   • CAD s/p MI and CABG in 1993 [I25.10]  Yes   • Generalized osteoarthrosis, involving multiple sites [M15.9]  Yes   • Hypertension [I10]  Yes   • Lumbosacral spondylosis without myelopathy [M47.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     Brief Hospital Course to date:  Darron Martino is a 87 y.o. male admitted with respiratory and breathing problems    Progressive Shortness of Breath  - suspected Progression of disease  - respiratory event overnight  - goal of care is comfort  - given high flow oxygen  Neuroendocrine cancer with diffuse mets  -Seen by Dr. Zaragoza  -Hospice was consulted by Dr. Zaragoza  Hypertension  -Blood pressure is well controlled today    Seen by Hospice  Working on Hospice Plan  Wife wants to talk to oncology    DVT Prophylaxis: Heparin 5000 units subcu every 8 hours    Disposition: I expect the patient to be discharged to Hospice    CODE STATUS:   Code Status and Medical Interventions:   Ordered at: 12/09/19 2871     Limited Support to NOT Include:    Other     Level Of Support Discussed With:    Health Care Surrogate     Code Status:    No CPR     Medical Interventions (Level of Support Prior to Arrest):    Limited     Limited Support to Also NOT Include:    health care surrogate does not want chest compressions         Electronically signed by Julio C Gaming MD, 12/10/19, 9:38 PM.

## 2019-12-11 NOTE — NURSING NOTE
"Patient having difficulty breathing at this time, on 85% on the nonrebreather. Lucid and alert and oriented and refusing for nursing staff to put BiPAP back on him. He states he's \"ready to die\". Will continue to monitor and keep patient as comfortable as possible.  "

## 2019-12-12 NOTE — PROGRESS NOTES
Hospice Progress Note    Date of Admission: 12/11/2019    Subjective:    Wife Yamilet at bedside.  Very appreciative of care.  Feels patient's nurse Leidy is doing a great job keeping patient comfy.  Says patient no longer opening eyes or responding to her.      Meds reviewed.  Scheduled dilaudid, ativan and haldol.  PRNs given as follows:  Dilaudid 0740 and 1359  Ativan 2224  Robinul x1 yesterday  *Has gotten Lasix 20mg on 12/10 and 12/11, 40mg on 12/9  *Total dilaudid is 5mg in last 30 hrs       Questions for Current Code Status     Question Answer Comment    Code Status No CPR     Medical Interventions (Level of Support Prior to Arrest) Comfort Measures         Scheduled Meds:  haloperidol lactate 1 mg Intravenous Q6H   HYDROmorphone 0.5 mg Intravenous Q6H   LORazepam 0.5 mg Intravenous Q6H     Continuous Infusions:   PRN Meds:.•  acetaminophen  •  bisacodyl  •  haloperidol lactate  •  HYDROmorphone  •  ketorolac  •  LORazepam    Objective: There were no vitals taken for this visit.     Intake/Output Summary (Last 24 hours) at 12/12/2019 1811  Last data filed at 12/11/2019 2221  Gross per 24 hour   Intake --   Output 375 ml   Net -375 ml     Physical Exam:  Gen - elderly, ill male in bed, mild respiratory distress, hypoxic on the monitor with SpO2 mid 60s to low 70s  Head/Neck - NC/AT, trachea midline, no JVD  Eyes - closed with normal lids and lashes  ENT - NRB mask in place, lips dry  Heart - Tachy, regular  Lungs - diffuse rhonchi and crackles, breathing mildly labored; lifting shoulders with respirations  Abd - soft, nontender, hypoactive BS  Ext - no edema or mottling  Skin - pale, dry, hands and feet cool, nail bed cyanosis  Neuro - not awake or alert, no response to my voice or exam, no myoclonus  Psych - calm, brow smooth, jaw relaxed  PPS 10%    Results from last 7 days   Lab Units 12/08/19  1700   WBC 10*3/mm3 7.21   HEMOGLOBIN g/dL 10.9*   HEMATOCRIT % 32.4*   PLATELETS 10*3/mm3 170     Results from  last 7 days   Lab Units 12/09/19  0039 12/08/19  1700   SODIUM mmol/L 129* 130*   POTASSIUM mmol/L 3.9 3.7   CHLORIDE mmol/L 98 97*   CO2 mmol/L 22.0 23.0   BUN mg/dL 6* 6*   CREATININE mg/dL 0.64* 0.55*   CALCIUM mg/dL 8.3* 8.8   BILIRUBIN mg/dL  --  0.8   ALK PHOS U/L  --  96   ALT (SGPT) U/L  --  6   AST (SGOT) U/L  --  23   GLUCOSE mg/dL 101* 103*     Active Hospital Problems     Diagnosis   POA   • **Pneumonia [J18.9]   Yes   • Neuroendocrine carcinoma of lung (CMS/HCC) [C7A.8]   Yes   • Hyponatremia [E87.1]   Yes   • Malignant pleural effusion [J91.0]   Yes   • CAD s/p MI and CABG in 1993 [I25.10]   Yes   • Generalized osteoarthrosis, involving multiple sites [M15.9]   Yes   • Hypertension [I10]   Yes   • Lumbosacral spondylosis without myelopathy [M47.817]         Impression:  88yo male with metastatic neuroendocrine lung cancer and progressive disease on second line therapy.  Declining overall and admitted with acute respiratory failure.  Looks like he has bilateral pneumonia but more likely lymphangitic spread.       Symptoms:  Dyspnea  Anxiety/Agitation/Restlessness  Debility  Anorexia     Plan:   -Hospice GIP for comfort focused end of life care.  Patient needs frequent skilled nursing assessment and intervention to promote comfort.  Unable to make needs known.  Dependent on IV medication for management of symptoms.  -Patient more comfy than on previous evals, but could do better.  Will change from scheduled dilaudid to an infusion at 0.2mg/hr.  PRN still available for dyspnea and pain.  -Continue scheduled ativan and dilaudid, dosing alternated Q3 hrs.  PRNs available for anxiety/agitation/restlessness.  -Lasix 40mg x1 today.  -Generous PRNs also available for fever, constipation and terminal restlessness.  -Visited with wife at bedside for 30 minutes.  Active listening with support and encouragement rovided.  -Prognosis of hours to days.      Sandi Valdez MD  12/12/19  6:11 PM

## 2019-12-12 NOTE — PROGRESS NOTES
12/12 PPS 10 % Pt resting when I arrived, O2 down to 8L NRB, Pt abdominal breathing but much less effort. Breath sounds coarse with rhonchi. Pt is tachypneic. Meza drained 375 cc. PRNS robinul 0.1 x 1, dilaudid 0.5 mg x1, dilaudid 1 mg x1, Ativan 0.5 mg x1, ativan 1 mg x1, morphine 2 mg x 2/ 24 hrs given. Wife at bedside and was called through the night as his sats dropped down. At present pt is resting peacefully and no concerns. Patient remains GIP appropriate due to need of IV medications and complex symptom management at EOL.

## 2019-12-13 NOTE — SIGNIFICANT NOTE
Exam confirms with auscultation zero audible heart tones and zero audible respirations. Mr.Kenneth ILEANA Martino was pronounced dead at 0615.  MD notified by Patient's RN.    Jaycee Munoz RN  Clinical House Supervisor  12/13/2019 6:36 AM

## 2019-12-13 NOTE — PLAN OF CARE
Pt resting comfortably at this time with no restlessness or agitation.  PCA pump infusing and scheduled Haldol and Ativan administered.  Pt continues on 8L NRB per spouses request and maintaining saturations in the low 70s .  Will continue to monitor closely

## 2019-12-17 NOTE — DISCHARGE SUMMARY
Date of Death:  19  Time of Death:  0615      Presenting Problem/History of Present Illness    Neuroendocrine carcinoma of lung (CMS/HCC)      Hospital Course    Patient is a 87 y.o. male admitted 19 for pna, Neuroendocrine carcinoma with brain mets, hematochezia, and hyponatremia. He presented to the ED c/o shortness of breath, cough, and weakness.     Imaging showed possible superimposed pneumonia in the RLL as well as new interstitial disease bilaterally, along with progression of mediastinal adenopathy. Dr. Zaragoza has seen the patient and per notes, feels this is progression of disease and likely lymphangitic spread. He is not a candidate for further treatment. Palliative and Hospice consulted.      Ultimately appreciating pt's wishes, wife elected for Comfort Measures. Pt was admitted to in Hospice on 19 for mgmt of acute symptoms 2/2 neuroendocrine tumor.       Social History:  Social History     Tobacco Use   • Smoking status: Former Smoker     Packs/day: 2.00     Years: 48.00     Pack years: 96.00     Types: Cigarettes     Last attempt to quit: 1992     Years since quittin.9   • Smokeless tobacco: Never Used   Substance Use Topics   • Alcohol use: Yes     Comment: occasional         Consults:   Consults     Date and Time Order Name Status Description    2019 0129 Inpatient Palliative Care MD Consult Completed         Exam confirms with auscultation zero audible heart tones and zero audible respirations. Mr.Kenneth ILEANA Martino was pronounced dead at 0615.  MD notified by Patient's RN.     Jaycee Munoz, RN  Clinical House Supervisor  2019 6:36 AM    CLEM Mcmillan  19  10:22 AM

## 2022-09-30 NOTE — PROCEDURES
DATE OF PROCEDURE: 4/13/2019     PREOPERATIVE DIAGNOSES:  1.Right pleural effusion     POSTOPERATIVE DIAGNOSES:    1.Right pleural effusion     PROCEDURES PERFORMED:    1. Right tube thoracostomy insertion    SURGEON: Juan R Burnette MD        ANESTHESIA: 1% lidocaine local      ESTIMATED BLOOD LOSS: Minimal       INDICATIONS:  86-year-old  male with a history of hypertension, melanoma, remote tobacco abuse, coronary artery disease and TIA who presented with shortness of breath.  A large right pleural effusion was found and the patient underwent tube thoracostomy in interventional radiology.  Preliminary pathology results are concerning for possible malignancy.  The patient was felt to be a reasonable candidate for larger bore pigtail thoracostomy insertion.      DESCRIPTION OF PROCEDURE:  The patient was prepped and draped in the usual sterile fashion.  Bedside ultrasound was performed to localize the right hemidiaphragm.  1% lidocaine was inserted into the right chest wall and intercostal space.  Needle access of the right pleural effusion was obtained just over the diaphragm.  There was return of thin bloody fluid.  The wire was inserted in the chest and dilation performed using modified Seldinger technique.  The Cook Brian pigtail catheter was inserted over the wire and connected to Pleur-evac suction.  2900 mL of serosanguinous fluid was drained.  The tube was secured with a silk suture.  Overlying chlorhexidine impregnated Tegaderm was applied over the tube.  The previously placed small bore pigtail catheter from interventional radiology was removed and a DuoDERM applied over this insertion site.  The patient tolerated the procedure well and remained on the floor under the care of the nursing staff.    
yes